# Patient Record
Sex: FEMALE | Race: WHITE | Employment: OTHER | URBAN - METROPOLITAN AREA
[De-identification: names, ages, dates, MRNs, and addresses within clinical notes are randomized per-mention and may not be internally consistent; named-entity substitution may affect disease eponyms.]

---

## 2017-01-05 ENCOUNTER — HOSPITAL ENCOUNTER (OUTPATIENT)
Dept: MRI IMAGING | Age: 77
Discharge: HOME | End: 2017-01-05
Attending: INTERNAL MEDICINE
Payer: MEDICARE

## 2017-01-05 DIAGNOSIS — R10.10 UPPER ABDOMINAL PAIN, UNSPECIFIED: ICD-10-CM

## 2017-01-05 LAB — CREAT UR-MCNC: 1 MG/DL (ref 0.6–1.3)

## 2017-01-05 PROCEDURE — 74183 MRI ABD W/O CNTR FLWD CNTR: CPT

## 2017-01-05 PROCEDURE — 82565 ASSAY OF CREATININE: CPT

## 2017-01-05 PROCEDURE — A9585 GADOBUTROL INJECTION: HCPCS | Performed by: INTERNAL MEDICINE

## 2017-01-05 PROCEDURE — 74011250636 HC RX REV CODE- 250/636: Performed by: INTERNAL MEDICINE

## 2017-01-05 RX ADMIN — GADOBUTROL 15 ML: 604.72 INJECTION INTRAVENOUS at 16:57

## 2017-01-27 ENCOUNTER — HOSPITAL ENCOUNTER (OUTPATIENT)
Dept: PREADMISSION TESTING | Age: 77
Discharge: HOME | End: 2017-01-27
Payer: MEDICARE

## 2017-01-27 DIAGNOSIS — K80.50 CALCULUS OF BILE DUCT WITHOUT CHOLANGITIS OR CHOLECYSTITIS WITHOUT OBSTRUCTION: ICD-10-CM

## 2017-01-27 LAB
ALBUMIN SERPL BCP-MCNC: 3.9 G/DL (ref 3.4–5)
ALBUMIN/GLOB SERPL: 1.2 {RATIO} (ref 0.8–1.7)
ALP SERPL-CCNC: 83 U/L (ref 45–117)
ALT SERPL-CCNC: 19 U/L (ref 13–56)
ANION GAP BLD CALC-SCNC: 6 MMOL/L (ref 3–18)
AST SERPL W P-5'-P-CCNC: 11 U/L (ref 15–37)
BASOPHILS # BLD AUTO: 0 K/UL (ref 0–0.06)
BASOPHILS # BLD: 1 % (ref 0–2)
BILIRUB SERPL-MCNC: 0.4 MG/DL (ref 0.2–1)
BUN SERPL-MCNC: 20 MG/DL (ref 7–18)
BUN/CREAT SERPL: 22 (ref 12–20)
CALCIUM SERPL-MCNC: 8.9 MG/DL (ref 8.5–10.1)
CHLORIDE SERPL-SCNC: 107 MMOL/L (ref 100–108)
CO2 SERPL-SCNC: 31 MMOL/L (ref 21–32)
CREAT SERPL-MCNC: 0.91 MG/DL (ref 0.6–1.3)
DIFFERENTIAL METHOD BLD: ABNORMAL
EOSINOPHIL # BLD: 0.5 K/UL (ref 0–0.4)
EOSINOPHIL NFR BLD: 7 % (ref 0–5)
ERYTHROCYTE [DISTWIDTH] IN BLOOD BY AUTOMATED COUNT: 12.8 % (ref 11.6–14.5)
GLOBULIN SER CALC-MCNC: 3.3 G/DL (ref 2–4)
GLUCOSE SERPL-MCNC: 93 MG/DL (ref 74–99)
HCT VFR BLD AUTO: 42.9 % (ref 35–45)
HGB BLD-MCNC: 13.9 G/DL (ref 12–16)
INR PPP: 1 (ref 0.8–1.2)
LIPASE SERPL-CCNC: 287 U/L (ref 73–393)
LYMPHOCYTES # BLD AUTO: 28 % (ref 21–52)
LYMPHOCYTES # BLD: 1.8 K/UL (ref 0.9–3.6)
MCH RBC QN AUTO: 32.2 PG (ref 24–34)
MCHC RBC AUTO-ENTMCNC: 32.4 G/DL (ref 31–37)
MCV RBC AUTO: 99.3 FL (ref 74–97)
MONOCYTES # BLD: 0.7 K/UL (ref 0.05–1.2)
MONOCYTES NFR BLD AUTO: 10 % (ref 3–10)
NEUTS SEG # BLD: 3.5 K/UL (ref 1.8–8)
NEUTS SEG NFR BLD AUTO: 54 % (ref 40–73)
PLATELET # BLD AUTO: 287 K/UL (ref 135–420)
PMV BLD AUTO: 10.1 FL (ref 9.2–11.8)
POTASSIUM SERPL-SCNC: 3.8 MMOL/L (ref 3.5–5.5)
PROT SERPL-MCNC: 7.2 G/DL (ref 6.4–8.2)
PROTHROMBIN TIME: 12.4 SEC (ref 11.5–15.2)
RBC # BLD AUTO: 4.32 M/UL (ref 4.2–5.3)
SODIUM SERPL-SCNC: 144 MMOL/L (ref 136–145)
WBC # BLD AUTO: 6.5 K/UL (ref 4.6–13.2)

## 2017-01-27 PROCEDURE — 80053 COMPREHEN METABOLIC PANEL: CPT | Performed by: INTERNAL MEDICINE

## 2017-01-27 PROCEDURE — 36415 COLL VENOUS BLD VENIPUNCTURE: CPT | Performed by: INTERNAL MEDICINE

## 2017-01-27 PROCEDURE — 83690 ASSAY OF LIPASE: CPT | Performed by: INTERNAL MEDICINE

## 2017-01-27 PROCEDURE — 85025 COMPLETE CBC W/AUTO DIFF WBC: CPT | Performed by: INTERNAL MEDICINE

## 2017-01-27 PROCEDURE — 85610 PROTHROMBIN TIME: CPT | Performed by: INTERNAL MEDICINE

## 2017-02-06 ENCOUNTER — ANESTHESIA EVENT (OUTPATIENT)
Dept: ENDOSCOPY | Age: 77
DRG: 439 | End: 2017-02-06
Payer: MEDICARE

## 2017-02-07 ENCOUNTER — HOSPITAL ENCOUNTER (OUTPATIENT)
Age: 77
Setting detail: OUTPATIENT SURGERY
Discharge: HOME | DRG: 439 | End: 2017-02-07
Attending: INTERNAL MEDICINE | Admitting: INTERNAL MEDICINE
Payer: MEDICARE

## 2017-02-07 ENCOUNTER — ANESTHESIA (OUTPATIENT)
Dept: ENDOSCOPY | Age: 77
DRG: 439 | End: 2017-02-07
Payer: MEDICARE

## 2017-02-07 ENCOUNTER — APPOINTMENT (OUTPATIENT)
Dept: GENERAL RADIOLOGY | Age: 77
DRG: 439 | End: 2017-02-07
Attending: INTERNAL MEDICINE
Payer: MEDICARE

## 2017-02-07 ENCOUNTER — SURGERY (OUTPATIENT)
Age: 77
End: 2017-02-07

## 2017-02-07 VITALS
DIASTOLIC BLOOD PRESSURE: 87 MMHG | TEMPERATURE: 97 F | WEIGHT: 134 LBS | HEART RATE: 60 BPM | BODY MASS INDEX: 24.66 KG/M2 | SYSTOLIC BLOOD PRESSURE: 109 MMHG | HEIGHT: 62 IN | OXYGEN SATURATION: 100 % | RESPIRATION RATE: 14 BRPM

## 2017-02-07 LAB
ATRIAL RATE: 59 BPM
CALCULATED P AXIS, ECG09: 69 DEGREES
CALCULATED R AXIS, ECG10: 59 DEGREES
CALCULATED T AXIS, ECG11: 43 DEGREES
DIAGNOSIS, 93000: NORMAL
P-R INTERVAL, ECG05: 186 MS
Q-T INTERVAL, ECG07: 462 MS
QRS DURATION, ECG06: 70 MS
QTC CALCULATION (BEZET), ECG08: 457 MS
VENTRICULAR RATE, ECG03: 59 BPM

## 2017-02-07 PROCEDURE — BF101ZZ FLUOROSCOPY OF BILE DUCTS USING LOW OSMOLAR CONTRAST: ICD-10-PCS | Performed by: INTERNAL MEDICINE

## 2017-02-07 PROCEDURE — 0F7C8ZZ DILATION OF AMPULLA OF VATER, VIA NATURAL OR ARTIFICIAL OPENING ENDOSCOPIC: ICD-10-PCS | Performed by: INTERNAL MEDICINE

## 2017-02-07 PROCEDURE — 0FCC8ZZ EXTIRPATION OF MATTER FROM AMPULLA OF VATER, VIA NATURAL OR ARTIFICIAL OPENING ENDOSCOPIC: ICD-10-PCS | Performed by: INTERNAL MEDICINE

## 2017-02-07 RX ORDER — ONDANSETRON 2 MG/ML
INJECTION INTRAMUSCULAR; INTRAVENOUS AS NEEDED
Status: DISCONTINUED | OUTPATIENT
Start: 2017-02-07 | End: 2017-02-07 | Stop reason: HOSPADM

## 2017-02-07 RX ORDER — SODIUM CHLORIDE 0.9 % (FLUSH) 0.9 %
5-10 SYRINGE (ML) INJECTION AS NEEDED
Status: CANCELLED | OUTPATIENT
Start: 2017-02-07 | End: 2017-02-07

## 2017-02-07 RX ORDER — MIDAZOLAM HYDROCHLORIDE 1 MG/ML
INJECTION, SOLUTION INTRAMUSCULAR; INTRAVENOUS AS NEEDED
Status: DISCONTINUED | OUTPATIENT
Start: 2017-02-07 | End: 2017-02-07 | Stop reason: HOSPADM

## 2017-02-07 RX ORDER — HYDRALAZINE HYDROCHLORIDE 20 MG/ML
INJECTION INTRAMUSCULAR; INTRAVENOUS AS NEEDED
Status: DISCONTINUED | OUTPATIENT
Start: 2017-02-07 | End: 2017-02-07 | Stop reason: HOSPADM

## 2017-02-07 RX ORDER — ONDANSETRON 2 MG/ML
INJECTION INTRAMUSCULAR; INTRAVENOUS
Status: DISCONTINUED
Start: 2017-02-07 | End: 2017-02-07 | Stop reason: HOSPADM

## 2017-02-07 RX ORDER — CIPROFLOXACIN 2 MG/ML
400 INJECTION, SOLUTION INTRAVENOUS EVERY 24 HOURS
Status: DISCONTINUED | OUTPATIENT
Start: 2017-02-07 | End: 2017-02-07 | Stop reason: HOSPADM

## 2017-02-07 RX ORDER — ONDANSETRON 2 MG/ML
4 INJECTION INTRAMUSCULAR; INTRAVENOUS
Status: CANCELLED | OUTPATIENT
Start: 2017-02-07

## 2017-02-07 RX ORDER — SODIUM CHLORIDE, SODIUM LACTATE, POTASSIUM CHLORIDE, CALCIUM CHLORIDE 600; 310; 30; 20 MG/100ML; MG/100ML; MG/100ML; MG/100ML
50 INJECTION, SOLUTION INTRAVENOUS CONTINUOUS
Status: DISCONTINUED | OUTPATIENT
Start: 2017-02-08 | End: 2017-02-07 | Stop reason: HOSPADM

## 2017-02-07 RX ORDER — SODIUM CHLORIDE 0.9 % (FLUSH) 0.9 %
5-10 SYRINGE (ML) INJECTION EVERY 8 HOURS
Status: CANCELLED | OUTPATIENT
Start: 2017-02-07 | End: 2017-02-07

## 2017-02-07 RX ORDER — PROPOFOL 10 MG/ML
INJECTION, EMULSION INTRAVENOUS AS NEEDED
Status: DISCONTINUED | OUTPATIENT
Start: 2017-02-07 | End: 2017-02-07 | Stop reason: HOSPADM

## 2017-02-07 RX ORDER — DEXTROMETHORPHAN/PSEUDOEPHED 2.5-7.5/.8
1.2 DROPS ORAL
Status: CANCELLED | OUTPATIENT
Start: 2017-02-07

## 2017-02-07 RX ORDER — LIDOCAINE HYDROCHLORIDE 20 MG/ML
INJECTION, SOLUTION EPIDURAL; INFILTRATION; INTRACAUDAL; PERINEURAL AS NEEDED
Status: DISCONTINUED | OUTPATIENT
Start: 2017-02-07 | End: 2017-02-07 | Stop reason: HOSPADM

## 2017-02-07 RX ORDER — PROPOFOL 10 MG/ML
INJECTION, EMULSION INTRAVENOUS
Status: DISCONTINUED | OUTPATIENT
Start: 2017-02-07 | End: 2017-02-07 | Stop reason: HOSPADM

## 2017-02-07 RX ADMIN — IOVERSOL 38 ML: 678 INJECTION INTRA-ARTERIAL; INTRAVENOUS at 14:41

## 2017-02-07 RX ADMIN — LIDOCAINE HYDROCHLORIDE 80 MG: 20 INJECTION, SOLUTION EPIDURAL; INFILTRATION; INTRACAUDAL; PERINEURAL at 13:56

## 2017-02-07 RX ADMIN — PROPOFOL 70 MG: 10 INJECTION, EMULSION INTRAVENOUS at 13:56

## 2017-02-07 RX ADMIN — MIDAZOLAM HYDROCHLORIDE 1 MG: 1 INJECTION, SOLUTION INTRAMUSCULAR; INTRAVENOUS at 13:56

## 2017-02-07 RX ADMIN — ONDANSETRON 4 MG: 2 INJECTION INTRAMUSCULAR; INTRAVENOUS at 15:00

## 2017-02-07 RX ADMIN — HYDRALAZINE HYDROCHLORIDE 10 MG: 20 INJECTION INTRAMUSCULAR; INTRAVENOUS at 14:08

## 2017-02-07 RX ADMIN — SODIUM CHLORIDE, SODIUM LACTATE, POTASSIUM CHLORIDE, AND CALCIUM CHLORIDE 50 ML/HR: 600; 310; 30; 20 INJECTION, SOLUTION INTRAVENOUS at 12:43

## 2017-02-07 RX ADMIN — PROPOFOL 20 MG: 10 INJECTION, EMULSION INTRAVENOUS at 14:27

## 2017-02-07 RX ADMIN — PROPOFOL 20 MG: 10 INJECTION, EMULSION INTRAVENOUS at 14:30

## 2017-02-07 RX ADMIN — GLUCAGON HYDROCHLORIDE 0.5 MG: KIT at 14:08

## 2017-02-07 RX ADMIN — PROPOFOL 100 MCG/KG/MIN: 10 INJECTION, EMULSION INTRAVENOUS at 13:58

## 2017-02-07 RX ADMIN — CIPROFLOXACIN 400 MG: 2 INJECTION, SOLUTION INTRAVENOUS at 13:55

## 2017-02-07 NOTE — PROGRESS NOTES
Patient ordered Cipro 400mg iv q 12 hours  CrCl~45ml/min  Order changed to cipro 400mg IV q 24 hrs per renal dosing protocol.   Pharmacy to monitor and adjust.

## 2017-02-07 NOTE — IP AVS SNAPSHOT
[address] 
[phone] Patient: Erika Avina MRN: [id number] BAU:[birth year]42[birth year] You are allergic to the following Allergen Reactions Codeine Other (comments) Chills, Loss of consciousness Norco (Hydrocodone-Acetaminophen) Nausea Only Vistaril (Hydroxyzine Pamoate) Vertigo Recent Documentation Height Weight Breastfeeding? BMI OB Status Smoking Status 1.575 m 60.8 kg No 24.51 kg/m2 Postmenopausal Former Smoker Emergency Contacts Name Discharge Info Relation Home Work Mobile [address] CAREGIVER [3] Daughter [21]   [phone] About your hospitalization You were admitted on:  February 7, 2017 [facility] You were discharged on:  February 7, 2017 Unit phone number:  [phone] Why you were hospitalized Your primary diagnosis was:  Not on File Providers Seen During Your Hospitalizations Provider Role Specialty Primary office phone Annamarie Aguillon MD Attending Provider Gastroenterology [phone] Your Primary Care Physician (PCP) Primary Care Physician Office Phone Office Fax Alan Chong [phone] Follow-up Information Follow up With Details Comments Contact Info Flaca Ba MD   [facility]  
[facility] [address] [facility] [address] [phone] Current Discharge Medication List  
  
CONTINUE these medications which have NOT CHANGED Dose & Instructions Dispensing Information Comments Morning Noon Evening Bedtime  
 amLODIPine 10 mg tablet Commonly known as:  Phuong Colon Your next dose is: Today, Tomorrow Other:  _________ Take  by mouth daily. Refills:  0  
     
   
   
   
  
 esomeprazole 40 mg capsule Commonly known as:  Maxwell Roe Your next dose is: Today, Tomorrow Other:  _________ Take  by mouth daily. Refills:  0  
     
   
   
   
  
 losartan 100 mg tablet Commonly known as:  COZAAR Your next dose is: Today, Tomorrow Other:  _________ Dose:  100 mg Take 100 mg by mouth daily. Refills:  0  
     
   
   
   
  
 metroNIDAZOLE 1 % topical gel Commonly known as:  Carroll Arnel Your next dose is: Today, Tomorrow Other:  _________ Apply  to affected area daily. Use a thin layer to affected areas after washing Refills:  0  
     
   
   
   
  
 multivitamin with iron tablet Your next dose is: Today, Tomorrow Other:  _________ Dose:  1 Tab Take 1 Tab by mouth daily. Refills:  0  
     
   
   
   
  
 nystatin-triamcinolone topical cream  
Commonly known as:  MYCOLOG II Your next dose is: Today, Tomorrow Other:  _________ Apply  to affected area two (2) times a day. Refills:  0  
     
   
   
   
  
 solifenacin 10 mg tablet Commonly known as:  Gearldine Pedro Luis Your next dose is: Today, Tomorrow Other:  _________ Dose:  10 mg Take 1 Tab by mouth daily. Quantity:  90 Tab Refills:  3  
     
   
   
   
  
 zolpidem 5 mg tablet Commonly known as:  AMBIEN Your next dose is: Today, Tomorrow Other:  _________ Take  by mouth nightly as needed for Sleep. Refills:  0 Discharge Instructions Patient Discharge Instructions Torrie Maldonado / [id number] : 1940 Admitted 2017 Discharged: 2017 Procedure Impression: 1. Choledocholithiasis:  Multiple large stones noted in the distal common bile duct. 2. Sphincterotomy performed without complications. Balloon dilation performed with 10mm TTS balloon. 3. Stone extraction with 12mm Balloon. 4. Completion balloon cholangiogram with no further filling defects. 5. Otherwise normal ERCP Recommendation: 1. Resume regular diet. 2. Follow up with Dr. Denys Killian in 2 weeks. Recommended Diet: Regular Diet Recommended Activity: 1. Do not drink alcohol, drive or operate machinery for 12 hours 2. Call if any fever, abdominal pain or bleeding noted. Signed By: Tabatha Guerrero MD   
 February 7, 2017 Patient armband removed and given to patient to take home. Patient was informed of the privacy risks if armband lost or stolen Discharge Orders None Introducing [facility] SERVICES! [facility] introduce portal paciente MyChart . Ahora se puede acceder a partes de rios expediente médico, enviar por correo electrónico la oficina de rios médico y solicitar renovaciones de medicamentos en línea. En rios navegador de Internet , Avery Ramos a [email or web address] Nehemias clic en el usuario por Dionicio Taylor? Tristin Seed clic aquí en la sesión Edi Susan. Verá la página de registro [place]. Ingrese rios código de Bank [facility] katharina y srinivasan aparece a continuación. Usted no tendrá que [employer] código después de kristyn completado el proceso de registro . Si usted no se inscribe antes de la fecha de caducidad , debe solicitar un nuevo código. · MyChart Código de acceso : [id number] Expires: 2/28/2017 11:27 AM 
 
Ingresa los últimos cuatro dígitos de rios Número de Seguro Social ( xxxx ) y fecha de nacimiento ( dd / mm / aaaa ) srinivasan se indica y nehemias clic en Enviar. Usted será llevado a la siguiente página de registro . Crear un ID MyChart . Esta será rios ID de inicio de sesión de MyChart y no puede ser [place] , por lo que pensar en anita que es Julane Osier y fácil de recordar . Crear anita contraseña MyChart . Usted puede cambiar rios contraseña en cualquier momento . Ingrese rios Password Reset de preguntas y [place] .  Melvina se puede utilizar en un momento posterior si usted olvida rios contraseña. Introduzca rios dirección de correo electrónico . Venkata Clinechment recibirá anita notificación por correo electrónico cuando la nueva información está disponible en MyChart . Becki lantigua en Registrarse. Floydene Esther michelle y descargar porciones de rios expediente médico. 
Lisbeth clic en el enlace de descarga del menú Resumen para descargar anita copia portátil de rios información médica . Si tiene [employer] , por favor visite la sección de preguntas frecuentes del sitio web MyChart . Recuerde, MyChart NO es que se utilizará para las necesidades urgentes. Para emergencias médicas , llame al 911 . Ahora disponible en rios iPhone y Android ! General Information Please provide this summary of care documentation to your next provider. Patient Signature:  ____________________________________________________________ Date:  ____________________________________________________________  
  
[facility] Provider Signature:  ____________________________________________________________ Date:  ____________________________________________________________  
  
  
   
  
[facility] 
 
 
 [address] [place] [address] Gabrielle Crawley 
[phone] Patient: Catracho Rape MRN: [id number] :[id number][id number] Tiene alergias a lo siguiente [address] Codeine Other (comments) Chills, Loss of consciousness Norco (Hydrocodone-Acetaminophen) Nausea Only Vistaril (Hydroxyzine Pamoate) Vertigo Documentación recientes Height Weight Está amamantando? BMI [facility]) Estado obstétrico Estatus de tabaquísmo 1.575 m 60.8 kg No 24.51 kg/m2 Postmenopausal Former Smoker Emergency Contacts Name Discharge Info Relation Home Work Mobile [address] CAREGIVER [3] Daughter [21]   [phone] Sobre rios hospitalización Le admitieron el:  February 7, 2017 [facility] tratamiento más reciente fue el:  [facility] PHASE 2 RECOVERY Le dieron de aleksandar el:  February 7, 2017 Número de teléfono de la unidad:  [phone] Por qué le ingresaron [facility] diagnosis primaria es:  No está archivado/a Proveedores de verse danay georgiana hospitalizaciones Personal Médico Rol Especialidad Teléfono principal de la oficina Zaire Taylor MD Attending Provider Gastroenterology [phone] [facility] médico de atención primaria (PCP ) Primary Care Physician Office Phone Office Fax [facility] [phone] Follow-up Information Follow up With Details Comments Contact Info MD [address]rn S[address] [facility] [address] [facility] [address] [phone] Aprobación de la gestión actual lista de medicamentos CONTINUAR estos medicamentos que no [place] Dosis e instrucciones Información de dispensación Comentarios Morning Noon Evening Bedtime  
 amLODIPine 10 mg tablet También conocido srinivasan:  Tad Jacque Your next dose is: Today, Tomorrow Other:  _________ Take  by mouth daily. recargas:  0  
     
   
   
   
  
 esomeprazole 40 mg capsule También conocido srinivasan:  Tianna Ruiz Your next dose is: Today, Tomorrow Other:  _________ Take  by mouth daily. recargas:  0  
     
   
   
   
  
 losartan 100 mg tablet También conocido srinivasan:  COZAAR Your next dose is: Today, Tomorrow Other:  _________ Dosis:  100 mg Take 100 mg by mouth daily. recargas:  0  
     
   
   
   
  
 metroNIDAZOLE 1 % topical gel También conocido srinivasan:  Caroline Han Your next dose is: Today, Tomorrow Other:  _________ Apply  to affected area daily. Use a thin layer to affected areas after washing  
 recargas:  0 multivitamin with iron tablet Your next dose is: Today, Tomorrow Other:  _________ Dosis:  1 Tab Take 1 Tab by mouth daily. recargas:  0  
     
   
   
   
  
 nystatin-triamcinolone topical cream  
También conocido srinivasan:  MYCOLOG II Your next dose is: Today, Tomorrow Other:  _________ Apply  to affected area two (2) times a day. recargas:  0  
     
   
   
   
  
 solifenacin 10 mg tablet También conocido srinivasan:  Queen Beverage Your next dose is: Today, Tomorrow Other:  _________ Dosis:  10 mg Take 1 Tab by mouth daily. cantidad:  90 Tab  
recargas:  3  
     
   
   
   
  
 zolpidem 5 mg tablet También conocido srinivasan:  Julius Em Your next dose is: Today, Tomorrow Other:  _________ Take  by mouth nightly as needed for Sleep.  
 recargas:  0 Discharge Instructions Patient Discharge Instructions Sangeeta Myers / [id number] : 1940 Admitted 2017 Discharged: 2017 Procedure Impression: 1. Choledocholithiasis:  Multiple large stones noted in the distal common bile duct. 2. Sphincterotomy performed without complications. Balloon dilation performed with 10mm TTS balloon. 3. Stone extraction with 12mm Balloon. 4. Completion balloon cholangiogram with no further filling defects. 5. Otherwise normal ERCP Recommendation: 1. Resume regular diet. 2. Follow up with Dr. Hodan Lepe in 2 weeks. Recommended Diet: Regular Diet Recommended Activity: 1. Do not drink alcohol, drive or operate machinery for 12 hours 2. Call if any fever, abdominal pain or bleeding noted. Signed By: Nelly Long MD   
 2017 Patient armband removed and given to patient to take home. Patient was informed of the privacy risks if armband lost or stolen Discharge Orders [facility] & HEALTH SERVICES! [facility] introduce portal paciente MyChart . Ahora se puede acceder a partes de rios expediente médico, enviar por correo electrónico la oficina de rios médico y solicitar renovaciones de medicamentos en línea. En rios navegador de Internet , Lazarus Seneca a [email or web address] Nehemias clic en el usuario por Francis Rinaldi? Jf Tan clic aquí en la sesión Norvel Jennifer. Verá la página de registro [place]. Ingrese rios código de [employer] katharina y srinivasan aparece a continuación. Usted no tendrá que [employer] código después de kristyn completado el proceso de registro . Si usted no se inscribe antes de la fecha de caducidad , debe solicitar un nuevo código. · MyChart Código de acceso : [id number] Expires: 2/28/2017 11:27 AM 
 
Ingresa los últimos cuatro dígitos de rios Número de Seguro Social ( xxxx ) y fecha de nacimiento ( dd / mm / aaaa ) srinivasan se indica y nehemias clic en Enviar. Usted será llevado a la siguiente página de registro . Crear un ID MyChart . Esta será rios ID de inicio de sesión de MyChart y no puede ser [place] , por lo que pensar en anita que es [place]  y fácil de recordar . Crear anita contraseña MyChart . Usted puede cambiar rios contraseña en cualquier momento . Ingrese rios Password Reset de preguntas y [place] . [place] se puede utilizar en un momento posterior si usted olvida rios contraseña. Introduzca rios dirección de correo electrónico . Roseanna Lin recibirá anita notificación por correo electrónico cuando la nueva información está disponible en MyChart . Mike Noland clic en Registrarse. Daralene Kanwal michelle y descargar porciones de rios expediente médico. 
Nehemias clic en el enlace de descarga del menú Resumen para descargar anita copia portátil de rios información médica . Si tiene [employer] , por favor visite la sección de preguntas frecuentes del sitio web MyChart . Recuerde, MyChart NO es que se utilizará para las necesidades urgentes. Para emergencias médicas , llame al 911 . Ahora disponible en rios iPhone y Android ! General Information Please provide this summary of care documentation to your next provider. Patient Signature:  ____________________________________________________________ Date:  ____________________________________________________________  
  
[facility] Signature:  ____________________________________________________________ Date:  ____________________________________________________________

## 2017-02-07 NOTE — DISCHARGE INSTRUCTIONS
Patient Discharge Instructions    Carla Guillory / [id number] : 1940    Admitted 2017 Discharged: 2017         Procedure Impression:  1. Choledocholithiasis:  Multiple large stones noted in the distal common bile duct. 2. Sphincterotomy performed without complications. Balloon dilation performed with 10mm TTS balloon. 3. Stone extraction with 12mm Balloon. 4. Completion balloon cholangiogram with no further filling defects. 5. Otherwise normal ERCP      Recommendation:  1. Resume regular diet. 2. Follow up with Dr. Mirela Arguelles in 2 weeks. Recommended Diet: Regular Diet    Recommended Activity:    1. Do not drink alcohol, drive or operate machinery for 12 hours   2. Call if any fever, abdominal pain or bleeding noted. Signed By: Jose He MD     2017       Patient armband removed and given to patient to take home.   Patient was informed of the privacy risks if armband lost or stolen

## 2017-02-07 NOTE — H&P
History and Physical    Ladonna Overall        [birth year]  [id number]        [id number]     Pre-Procedure Diagnosis:  k80.50    Chief Complaint:  No chief complaint on file. HPI: patient with recent MRCP with dilated duct and multiple CBD stones. She has no jaundice. No other complaints related. She presents for ERCP. Past Medical History   Diagnosis Date    Abnormal LFTs     Acute neck sprain     Bursitis      right shoulder    Cervicalgia     Chest pain     CKD (chronic kidney disease), stage III     Dyslipidemia     Dyspnea     PATRICIA (generalized anxiety disorder)     Gallstones     Gastritis     Gastritis with bleeding     GERD (gastroesophageal reflux disease)     Hordeolum     Hyperlipidemia     Hypertension     Incontinence     Insomnia     OAB (overactive bladder)     Pruritus      Past Surgical History   Procedure Laterality Date    Hx appendectomy      Hx hysterectomy      Pr appendectomy      Hx endoscopy      Hx colonoscopy       Family History   Problem Relation Age of Onset    No Known Problems Brother      Social History     Social History    Marital status:      Spouse name: N/A    Number of children: N/A    Years of education: N/A     Social History Main Topics    Smoking status: Former Smoker     Quit date: 1/1/1980    Smokeless tobacco: Never Used    Alcohol use Yes      Comment: occassionally    Drug use: No    Sexual activity: Not Asked     Other Topics Concern    None     Social History Narrative       Allergies:     Allergies   Allergen Reactions    Codeine Other (comments)     Chills, Loss of consciousness     Norco [Hydrocodone-Acetaminophen] Nausea Only    Vistaril [Hydroxyzine Pamoate] Vertigo     Medications:   Current Facility-Administered Medications   Medication Dose Route Frequency    [START ON 2/8/2017] lactated ringers infusion  50 mL/hr IntraVENous CONTINUOUS    ioversol (OPTIRAY) 320 mg iodine/mL contrast injection 100 mL  100 mL InterCATHeter PRN    glucagon (GLUCAGEN) injection 1 mg  1 mg IntraVENous ONCE    ciprofloxacin (CIPRO) 400 mg IVPB (premix)  400 mg IntraVENous Q12H     Vital Signs   Visit Vitals    /70    Pulse (!) 58    Temp 97 °F (36.1 °C)    Resp 16    Ht 5' 2\" (1.575 m)    Wt 60.8 kg (134 lb)    SpO2 96%    Breastfeeding No    BMI 24.51 kg/m2       Review of Systems  A comprehensive review of systems was negative except for that written in the History of Present Illness. Physical Exam:  General:  Alert, cooperative, no distress, appears stated age. Eyes:  Conjunctivae/corneas clear. PERRL, EOMs intact. Fundi benign           Mouth/Throat: Lips, mucosa, and tongue normal. Teeth and gums normal.   Neck: Supple, symmetrical, trachea midline, no adenopathy, thyroid: no enlargement/tenderness/nodules, no carotid bruit and no JVD. Lungs:   Clear to auscultation bilaterally. Heart:  Regular rate and rhythm, S1, S2 normal, no murmur, click, rub or gallop. Abdomen:   Soft, non-tender. Bowel sounds normal. No masses,  No organomegaly. Extremities: Extremities normal, atraumatic, no cyanosis or edema. Skin: Skin color, texture, turgor normal. No rashes or lesions             Laboratory Data:  Recent Results (from the past 24 hour(s))   EKG, 12 LEAD, INITIAL    Collection Time: 02/07/17 12:29 PM   Result Value Ref Range    Ventricular Rate 59 BPM    Atrial Rate 59 BPM    P-R Interval 186 ms    QRS Duration 70 ms    Q-T Interval 462 ms    QTC Calculation (Bezet) 457 ms    Calculated P Axis 69 degrees    Calculated R Axis 59 degrees    Calculated T Axis 43 degrees    Diagnosis       Sinus bradycardia  Otherwise normal ECG  When compared with ECG of 18-DEC-2011 08:02,  T wave amplitude has decreased in Lateral leads         Hospital Problems  Date Reviewed: 2/7/2017    None          Impression and Plan:  Choledocholithiasis. MRCP with multiple CBD stones. Recommend ERCP.   See prior H&P. Vee Smalls MD  2/7/2017  1:40 PM

## 2017-02-07 NOTE — ANESTHESIA PREPROCEDURE EVALUATION
Anesthetic History               Review of Systems / Medical History  Patient summary reviewed and pertinent labs reviewed    Pulmonary                Comments: Current Smoker? NO       Elective Surgery? Yes       Abstained from smoking 24 hours prior to anesthesia? N/A    Risk Factors for Postoperative nausea/vomiting:       History of postoperative nausea/vomiting? NO       Female? YES       Motion sickness? NO       Intended opioid administration for postoperative analgesia?   NO   Neuro/Psych   Within defined limits           Cardiovascular    Hypertension                   GI/Hepatic/Renal     GERD    Renal disease: CRI       Endo/Other        Arthritis     Other Findings              Physical Exam    Airway  Mallampati: II  TM Distance: 4 - 6 cm  Neck ROM: normal range of motion   Mouth opening: Diminished (comment)     Cardiovascular    Rhythm: irregular  Rate: normal         Dental    Dentition: Upper partial plate and Lower partial plate     Pulmonary      Decreased breath sounds: bilateral           Abdominal  GI exam deferred       Other Findings            Anesthetic Plan    ASA: 3  Anesthesia type: MAC            Anesthetic plan and risks discussed with: Patient

## 2017-02-08 ENCOUNTER — HOSPITAL ENCOUNTER (INPATIENT)
Age: 77
LOS: 2 days | Discharge: HOME | DRG: 439 | End: 2017-02-10
Attending: EMERGENCY MEDICINE | Admitting: HOSPITALIST
Payer: MEDICARE

## 2017-02-08 ENCOUNTER — APPOINTMENT (OUTPATIENT)
Dept: ULTRASOUND IMAGING | Age: 77
DRG: 439 | End: 2017-02-08
Attending: EMERGENCY MEDICINE
Payer: MEDICARE

## 2017-02-08 ENCOUNTER — APPOINTMENT (OUTPATIENT)
Dept: CT IMAGING | Age: 77
DRG: 439 | End: 2017-02-08
Attending: EMERGENCY MEDICINE
Payer: MEDICARE

## 2017-02-08 DIAGNOSIS — E80.6 HYPERBILIRUBINEMIA: ICD-10-CM

## 2017-02-08 DIAGNOSIS — R11.2 NON-INTRACTABLE VOMITING WITH NAUSEA, UNSPECIFIED VOMITING TYPE: Primary | ICD-10-CM

## 2017-02-08 DIAGNOSIS — Z98.890 S/P ERCP: ICD-10-CM

## 2017-02-08 DIAGNOSIS — R79.89 ELEVATED LFTS: ICD-10-CM

## 2017-02-08 PROBLEM — K80.50 CHOLEDOCHOLITHIASIS: Status: ACTIVE | Noted: 2017-02-08

## 2017-02-08 PROBLEM — R11.15 PERSISTENT VOMITING: Status: ACTIVE | Noted: 2017-02-08

## 2017-02-08 PROBLEM — D72.829 LEUKOCYTOSIS: Status: ACTIVE | Noted: 2017-02-08

## 2017-02-08 LAB
ALBUMIN SERPL BCP-MCNC: 3.7 G/DL (ref 3.4–5)
ALBUMIN/GLOB SERPL: 1.2 {RATIO} (ref 0.8–1.7)
ALP SERPL-CCNC: 86 U/L (ref 45–117)
ALT SERPL-CCNC: 127 U/L (ref 13–56)
ANION GAP BLD CALC-SCNC: 13 MMOL/L (ref 3–18)
APPEARANCE UR: CLEAR
AST SERPL W P-5'-P-CCNC: 119 U/L (ref 15–37)
BACTERIA URNS QL MICRO: NEGATIVE /HPF
BASOPHILS # BLD AUTO: 0 K/UL (ref 0–0.06)
BASOPHILS # BLD: 0 % (ref 0–2)
BILIRUB SERPL-MCNC: 4.6 MG/DL (ref 0.2–1)
BILIRUB UR QL: ABNORMAL
BUN SERPL-MCNC: 22 MG/DL (ref 7–18)
BUN/CREAT SERPL: 18 (ref 12–20)
CALCIUM SERPL-MCNC: 8.9 MG/DL (ref 8.5–10.1)
CHLORIDE SERPL-SCNC: 103 MMOL/L (ref 100–108)
CO2 SERPL-SCNC: 23 MMOL/L (ref 21–32)
COLOR UR: ABNORMAL
CREAT SERPL-MCNC: 1.22 MG/DL (ref 0.6–1.3)
DIFFERENTIAL METHOD BLD: ABNORMAL
EOSINOPHIL # BLD: 0 K/UL (ref 0–0.4)
EOSINOPHIL NFR BLD: 0 % (ref 0–5)
EPITH CASTS URNS QL MICRO: NORMAL /LPF (ref 0–5)
ERYTHROCYTE [DISTWIDTH] IN BLOOD BY AUTOMATED COUNT: 12.6 % (ref 11.6–14.5)
GLOBULIN SER CALC-MCNC: 3.1 G/DL (ref 2–4)
GLUCOSE SERPL-MCNC: 126 MG/DL (ref 74–99)
GLUCOSE UR STRIP.AUTO-MCNC: NEGATIVE MG/DL
HCT VFR BLD AUTO: 41.9 % (ref 35–45)
HGB BLD-MCNC: 14.5 G/DL (ref 12–16)
HGB UR QL STRIP: NEGATIVE
KETONES UR QL STRIP.AUTO: NEGATIVE MG/DL
LEUKOCYTE ESTERASE UR QL STRIP.AUTO: ABNORMAL
LIPASE SERPL-CCNC: 459 U/L (ref 73–393)
LYMPHOCYTES # BLD AUTO: 4 % (ref 21–52)
LYMPHOCYTES # BLD: 0.6 K/UL (ref 0.9–3.6)
MCH RBC QN AUTO: 33.4 PG (ref 24–34)
MCHC RBC AUTO-ENTMCNC: 34.6 G/DL (ref 31–37)
MCV RBC AUTO: 96.5 FL (ref 74–97)
MONOCYTES # BLD: 0.1 K/UL (ref 0.05–1.2)
MONOCYTES NFR BLD AUTO: 1 % (ref 3–10)
NEUTS SEG # BLD: 13.4 K/UL (ref 1.8–8)
NEUTS SEG NFR BLD AUTO: 95 % (ref 40–73)
NITRITE UR QL STRIP.AUTO: NEGATIVE
PH UR STRIP: 7.5 [PH] (ref 5–8)
PLATELET # BLD AUTO: 261 K/UL (ref 135–420)
PMV BLD AUTO: 9.2 FL (ref 9.2–11.8)
POTASSIUM SERPL-SCNC: 3.4 MMOL/L (ref 3.5–5.5)
PROT SERPL-MCNC: 6.8 G/DL (ref 6.4–8.2)
PROT UR STRIP-MCNC: NEGATIVE MG/DL
RBC # BLD AUTO: 4.34 M/UL (ref 4.2–5.3)
RBC #/AREA URNS HPF: 0 /HPF (ref 0–5)
SODIUM SERPL-SCNC: 139 MMOL/L (ref 136–145)
SP GR UR REFRACTOMETRY: >1.03 (ref 1–1.03)
UROBILINOGEN UR QL STRIP.AUTO: >8 EU/DL (ref 0.2–1)
WBC # BLD AUTO: 14.2 K/UL (ref 4.6–13.2)
WBC URNS QL MICRO: NORMAL /HPF (ref 0–4)

## 2017-02-08 PROCEDURE — 83690 ASSAY OF LIPASE: CPT | Performed by: EMERGENCY MEDICINE

## 2017-02-08 PROCEDURE — 74011250637 HC RX REV CODE- 250/637: Performed by: PHYSICIAN ASSISTANT

## 2017-02-08 PROCEDURE — 74011250636 HC RX REV CODE- 250/636: Performed by: PHYSICIAN ASSISTANT

## 2017-02-08 PROCEDURE — 96361 HYDRATE IV INFUSION ADD-ON: CPT

## 2017-02-08 PROCEDURE — 65270000029 HC RM PRIVATE

## 2017-02-08 PROCEDURE — 96374 THER/PROPH/DIAG INJ IV PUSH: CPT

## 2017-02-08 PROCEDURE — 96375 TX/PRO/DX INJ NEW DRUG ADDON: CPT

## 2017-02-08 PROCEDURE — 76705 ECHO EXAM OF ABDOMEN: CPT

## 2017-02-08 PROCEDURE — 85025 COMPLETE CBC W/AUTO DIFF WBC: CPT | Performed by: EMERGENCY MEDICINE

## 2017-02-08 PROCEDURE — 80053 COMPREHEN METABOLIC PANEL: CPT | Performed by: EMERGENCY MEDICINE

## 2017-02-08 PROCEDURE — 99285 EMERGENCY DEPT VISIT HI MDM: CPT

## 2017-02-08 PROCEDURE — 74011250636 HC RX REV CODE- 250/636: Performed by: EMERGENCY MEDICINE

## 2017-02-08 PROCEDURE — 81001 URINALYSIS AUTO W/SCOPE: CPT | Performed by: EMERGENCY MEDICINE

## 2017-02-08 PROCEDURE — 74177 CT ABD & PELVIS W/CONTRAST: CPT

## 2017-02-08 PROCEDURE — 74011636320 HC RX REV CODE- 636/320: Performed by: EMERGENCY MEDICINE

## 2017-02-08 RX ORDER — AMLODIPINE BESYLATE 10 MG/1
10 TABLET ORAL DAILY
Status: DISCONTINUED | OUTPATIENT
Start: 2017-02-09 | End: 2017-02-10 | Stop reason: HOSPADM

## 2017-02-08 RX ORDER — BENZONATATE 200 MG/1
200 CAPSULE ORAL
COMMUNITY
End: 2017-09-28

## 2017-02-08 RX ORDER — LOSARTAN POTASSIUM 50 MG/1
100 TABLET ORAL DAILY
Status: DISCONTINUED | OUTPATIENT
Start: 2017-02-09 | End: 2017-02-10 | Stop reason: HOSPADM

## 2017-02-08 RX ORDER — DEXTROSE, SODIUM CHLORIDE, AND POTASSIUM CHLORIDE 5; .45; .15 G/100ML; G/100ML; G/100ML
125 INJECTION INTRAVENOUS CONTINUOUS
Status: DISCONTINUED | OUTPATIENT
Start: 2017-02-08 | End: 2017-02-10 | Stop reason: HOSPADM

## 2017-02-08 RX ORDER — AMOXICILLIN 875 MG/1
875 TABLET, FILM COATED ORAL 2 TIMES DAILY
COMMUNITY
End: 2017-02-10

## 2017-02-08 RX ORDER — PANTOPRAZOLE SODIUM 40 MG/1
40 TABLET, DELAYED RELEASE ORAL
Status: DISCONTINUED | OUTPATIENT
Start: 2017-02-09 | End: 2017-02-10 | Stop reason: HOSPADM

## 2017-02-08 RX ORDER — ONDANSETRON 2 MG/ML
4 INJECTION INTRAMUSCULAR; INTRAVENOUS
Status: DISCONTINUED | OUTPATIENT
Start: 2017-02-08 | End: 2017-02-10 | Stop reason: HOSPADM

## 2017-02-08 RX ORDER — ZOLPIDEM TARTRATE 5 MG/1
5 TABLET ORAL
Status: DISCONTINUED | OUTPATIENT
Start: 2017-02-08 | End: 2017-02-10 | Stop reason: HOSPADM

## 2017-02-08 RX ORDER — ONDANSETRON 2 MG/ML
4 INJECTION INTRAMUSCULAR; INTRAVENOUS
Status: COMPLETED | OUTPATIENT
Start: 2017-02-08 | End: 2017-02-08

## 2017-02-08 RX ORDER — MORPHINE SULFATE 2 MG/ML
2 INJECTION, SOLUTION INTRAMUSCULAR; INTRAVENOUS
Status: DISCONTINUED | OUTPATIENT
Start: 2017-02-08 | End: 2017-02-10 | Stop reason: HOSPADM

## 2017-02-08 RX ORDER — MORPHINE SULFATE 4 MG/ML
4 INJECTION, SOLUTION INTRAMUSCULAR; INTRAVENOUS
Status: COMPLETED | OUTPATIENT
Start: 2017-02-08 | End: 2017-02-08

## 2017-02-08 RX ORDER — ACETAMINOPHEN 325 MG/1
650 TABLET ORAL
Status: DISCONTINUED | OUTPATIENT
Start: 2017-02-08 | End: 2017-02-10 | Stop reason: HOSPADM

## 2017-02-08 RX ADMIN — SODIUM CHLORIDE 1000 ML: 900 INJECTION, SOLUTION INTRAVENOUS at 16:01

## 2017-02-08 RX ADMIN — Medication 4 MG: at 11:59

## 2017-02-08 RX ADMIN — MORPHINE SULFATE 2 MG: 2 INJECTION, SOLUTION INTRAMUSCULAR; INTRAVENOUS at 20:59

## 2017-02-08 RX ADMIN — ONDANSETRON 4 MG: 2 INJECTION INTRAMUSCULAR; INTRAVENOUS at 19:27

## 2017-02-08 RX ADMIN — ONDANSETRON 4 MG: 2 INJECTION INTRAMUSCULAR; INTRAVENOUS at 09:36

## 2017-02-08 RX ADMIN — DEXTROSE MONOHYDRATE, SODIUM CHLORIDE, AND POTASSIUM CHLORIDE 125 ML/HR: 50; 4.5; 1.49 INJECTION, SOLUTION INTRAVENOUS at 17:08

## 2017-02-08 RX ADMIN — SODIUM CHLORIDE 1000 ML: 9 INJECTION, SOLUTION INTRAVENOUS at 09:34

## 2017-02-08 RX ADMIN — ZOLPIDEM TARTRATE 5 MG: 5 TABLET, FILM COATED ORAL at 21:00

## 2017-02-08 RX ADMIN — IOPAMIDOL 80 ML: 612 INJECTION, SOLUTION INTRAVENOUS at 10:47

## 2017-02-08 NOTE — H&P
Of[facility]amar[facility]  Hospitalist Division      History & Physical    Patient: Ceci Mcclain MRN: [id number]  [facility]: [id number]    YOB: 1940  Age: 68 y.o. Sex: female    DOA: 2/8/2017 LOS:  LOS: 0 days        DOA:  2/8/2017  PCP:  Grant Hooker MD    Chief Complaint:  Vomiting and abdominal pain    Assessment/ Plan:     Patient Active Problem List   Diagnosis Code    History of gastrointestinal hemorrhage Z87.19    Incontinence R32    Hypertension I10    Hyperlipidemia E78.5    GERD (gastroesophageal reflux disease) K21.9    PATRICIA (generalized anxiety disorder) F41.1    CKD (chronic kidney disease), stage III N18.3    Choledocholithiasis K80.50    Persistent vomiting R11.10    Elevated LFTs R79.89    Leukocytosis D72.829       A/P:  - Choledocholithiasis - s/p ERCP and sphincterotomy and stone extraction yesterday with Dr. Aaliyah Moon. - Persistent vomiting with abdominal pain post-procedure - PRN pain control and anti-emetic. Continue IV fluids for hydration.   - Elevated LFT's - Follow labs. Elevation likely secondary to procedure yesterday. Trend lipase as well. - Leukocytosis - Also likely related to procedure yesterday. CT Abdomen without acute process  - HTN - Continue Norvasc/ Cozaar  - GERD - Continue PPI  - Insomnia - Continue Ambien nightly  - SCD's for DVT Prophylaxis      HPI:     Ceci Mcclain is a 68 y.o. female with a hx of HTN, GERD, CKD, Hyperlipidemia and insomnia who was admitted to [facility] on 2/8/17 for abdominal pain and vomiting post-procedure. She underwent an ERCP yesterday with Dr. Aaliyah Moon and was discharged to home after her procedure. She notes last night she developed abdominal pain and vomiting that continued in to this morning. She notes the pain was initially in her RUQ last night but this morning she \"felt it all over her whole body. \" she denies any fever, chills, diarrhea, dysuria, chest pain or shortness of breath. Today she has elevated LFT's with a total bilirubin of 4.6, ALT of 127, and . Her lipase is also elevated at 459 along with a leukocytosis of 14K. A Ct of her abdomen was consistent with changes related to her procedure yesterday without any acute process. GI has been consulted and she will be admitted for further evaluation and treatment. Past Medical History   Diagnosis Date    Abnormal LFTs     Acute neck sprain     Bursitis      right shoulder    Cervicalgia     Chest pain     CKD (chronic kidney disease), stage III     Dyslipidemia     Dyspnea     PATRICIA (generalized anxiety disorder)     Gallstones     Gastritis     Gastritis with bleeding     GERD (gastroesophageal reflux disease)     Hordeolum     Hyperlipidemia     Hypertension     Incontinence     Insomnia     OAB (overactive bladder)     Pruritus        Past Surgical History   Procedure Laterality Date    Hx appendectomy      Hx hysterectomy      Pr appendectomy      Hx endoscopy      Hx colonoscopy         Family History   Problem Relation Age of Onset    No Known Problems Brother        Social History     Social History    Marital status:      Spouse name: N/A    Number of children: N/A    Years of education: N/A     Social History Main Topics    Smoking status: Former Smoker     Quit date: 1/1/1980    Smokeless tobacco: Never Used    Alcohol use Yes      Comment: occassionally    Drug use: No    Sexual activity: Not Asked     Other Topics Concern    None     Social History Narrative       Prior to Admission medications    Medication Sig Start Date End Date Taking? Authorizing Provider   solifenacin (VESICARE) 10 mg tablet Take 1 Tab by mouth daily. 8/11/16   Rosaura English MD   metroNIDAZOLE (METROGEL) 1 % topical gel Apply  to affected area daily. Use a thin layer to affected areas after washing    Historical Provider   multivitamin with iron tablet Take 1 Tab by mouth daily.     Historical Provider zolpidem (AMBIEN) 5 mg tablet Take  by mouth nightly as needed for Sleep. Historical Provider   esomeprazole (NEXIUM) 40 mg capsule Take  by mouth daily. Historical Provider   losartan (COZAAR) 100 mg tablet Take 100 mg by mouth daily. Historical Provider   amLODIPine (NORVASC) 10 mg tablet Take  by mouth daily. Historical Provider   nystatin-triamcinolone (MYCOLOG II) topical cream Apply  to affected area two (2) times a day. Historical Provider       Allergies   Allergen Reactions    Codeine Other (comments)     Chills, Loss of consciousness     Norco [Hydrocodone-Acetaminophen] Nausea Only    Vistaril [Hydroxyzine Pamoate] Vertigo       Review of Systems:  - fever, - chills, - fatigue, - weight loss, - night sweats   - sore throat, - sinus congestion, - lymphadenopathy, - vision changes  - CP, -  palpitations  - dyspnea on exertion, - dyspnea at rest, - cough, - hemoptysis  - nausea, - vomiting, - diarrhea, + abdominal pain, - reflux, - dysphagia  - dysuria, - hematuria, - urinary frequency  - rash, - pruritis  - back pain, - neck pain, - myalgia, - arthralgia  - H/A, - numbness, - tingling, - weakness, - slurred speech    Physical Exam:      Visit Vitals    /59    Pulse 80    Temp 97.7 °F (36.5 °C)    Resp 20    SpO2 97%       Physical Exam:  Gen: In general, this is a well nourished female, in no acute distress on RA. HEENT: Sclerae nonicteric. Oral mucous membranes moist.    Neck: Supple with midline trachea. CV: RRR without murmur or rub appreciated. Resp:Respirations are unlabored without use of accessory muscles. Lung fields bilaterally without wheezes or rhonchi. Abd: Soft, nontender, nondistended. Extrem: Extremities are warm, without cyanosis or clubbing. No pitting pretibial edema. Palpable distal pulses X 4.   Skin: Warm, no visible rashes. Neuro: Patient is alert, oriented, and cooperative. No obvious focal defects. Moves all 4 extremities.     Labs Reviewed:    Recent Results (from the past 24 hour(s))   CBC WITH AUTOMATED DIFF    Collection Time: 02/08/17  9:22 AM   Result Value Ref Range    WBC 14.2 (H) 4.6 - 13.2 K/uL    RBC 4.34 4.20 - 5.30 M/uL    HGB 14.5 12.0 - 16.0 g/dL    HCT 41.9 35.0 - 45.0 %    MCV 96.5 74.0 - 97.0 FL    MCH 33.4 24.0 - 34.0 PG    MCHC 34.6 31.0 - 37.0 g/dL    RDW 12.6 11.6 - 14.5 %    PLATELET 845 302 - 350 K/uL    MPV 9.2 9.2 - 11.8 FL    NEUTROPHILS 95 (H) 40 - 73 %    LYMPHOCYTES 4 (L) 21 - 52 %    MONOCYTES 1 (L) 3 - 10 %    EOSINOPHILS 0 0 - 5 %    BASOPHILS 0 0 - 2 %    ABS. NEUTROPHILS 13.4 (H) 1.8 - 8.0 K/UL    ABS. LYMPHOCYTES 0.6 (L) 0.9 - 3.6 K/UL    ABS. MONOCYTES 0.1 0.05 - 1.2 K/UL    ABS. EOSINOPHILS 0.0 0.0 - 0.4 K/UL    ABS. BASOPHILS 0.0 0.0 - 0.06 K/UL    DF AUTOMATED     METABOLIC PANEL, COMPREHENSIVE    Collection Time: 02/08/17  9:22 AM   Result Value Ref Range    Sodium 139 136 - 145 mmol/L    Potassium 3.4 (L) 3.5 - 5.5 mmol/L    Chloride 103 100 - 108 mmol/L    CO2 23 21 - 32 mmol/L    Anion gap 13 3.0 - 18 mmol/L    Glucose 126 (H) 74 - 99 mg/dL    BUN 22 (H) 7.0 - 18 MG/DL    Creatinine 1.22 0.6 - 1.3 MG/DL    BUN/Creatinine ratio 18 12 - 20      GFR est AA 52 (L) >60 ml/min/1.73m2    GFR est non-AA 43 (L) >60 ml/min/1.73m2    Calcium 8.9 8.5 - 10.1 MG/DL    Bilirubin, total 4.6 (H) 0.2 - 1.0 MG/DL    ALT (SGPT) 127 (H) 13 - 56 U/L    AST (SGOT) 119 (H) 15 - 37 U/L    Alk.  phosphatase 86 45 - 117 U/L    Protein, total 6.8 6.4 - 8.2 g/dL    Albumin 3.7 3.4 - 5.0 g/dL    Globulin 3.1 2.0 - 4.0 g/dL    A-G Ratio 1.2 0.8 - 1.7     LIPASE    Collection Time: 02/08/17  9:22 AM   Result Value Ref Range    Lipase 459 (H) 73 - 393 U/L   URINALYSIS W/ RFLX MICROSCOPIC    Collection Time: 02/08/17 10:38 AM   Result Value Ref Range    Color DARK YELLOW      Appearance CLEAR      Specific gravity >1.030 (H) 1.005 - 1.030    pH (UA) 7.5 5.0 - 8.0      Protein NEGATIVE  NEG mg/dL    Glucose NEGATIVE  NEG mg/dL Ketone NEGATIVE  NEG mg/dL    Bilirubin SMALL (A) NEG      Blood NEGATIVE  NEG      Urobilinogen >8.0 (H) 0.2 - 1.0 EU/dL    Nitrites NEGATIVE  NEG      Leukocyte Esterase TRACE (A) NEG     URINE MICROSCOPIC ONLY    Collection Time: 02/08/17 10:38 AM   Result Value Ref Range    WBC 0 to 3 0 - 4 /hpf    RBC 0 0 - 5 /hpf    Epithelial cells 1+ 0 - 5 /lpf    Bacteria NEGATIVE  NEG /hpf       Imaging Reviewed:    CT Abdomen/ Pelvis  2/8/17  IMPRESSION:  1. Small amount of pneumobilia and intraluminal gallbladder contrast in keeping  with recent prior ERCP procedure. No acute intra-abdominal or pelvic abnormality  identified. 2. Small hiatal hernia. 3. Diverticulosis without evidence of diverticulitis    MRCP  1/5/17  IMPRESSION:   Choledocholithiasis without significant ductal dilation. No stones identified in  the gallbladder      Aleksandra Valentino [facility]  Hospitalist Division  Pager:  [phone]  Office:  [phone]

## 2017-02-08 NOTE — ED NOTES
Purposeful rounding completed:    Side rails up x 2:  YES  Bed in low position and wheels locked: YES  Call bell within reach: YES  Comfort addressed: YES    Toileting needs addressed: NO  Plan of care reviewed/updated with patient and or family members: YES  IV site assessed: YES  Pain assessed and addressed: YES, 2    Family at bedside

## 2017-02-08 NOTE — ED NOTES
Attempted to get urine sample but CT was here to pick patient up. Will get sample when patient returns.

## 2017-02-08 NOTE — ED TRIAGE NOTES
Patient with recent gall stone removal c/o of abdominal pain that radiates to her back and up through her shoulders. Patient c/o N/V. Patient coughing up thick, white mucus.

## 2017-02-08 NOTE — IP AVS SNAPSHOT
Current Discharge Medication List  
  
Take these medications at their scheduled times Dose & Instructions Dispensing Information Comments Morning Noon Evening Bedtime  
 amLODIPine 10 mg tablet Commonly known as:  Dk Freedmaner Your next dose is:  Tomorrow Take  by mouth daily. Refills:  0  
     
  
   
   
   
  
 esomeprazole 40 mg capsule Commonly known as:  Kerns Gaunt Your next dose is:  Tomorrow Dose:  40 mg Take 40 mg by mouth two (2) times a day. Refills:  0  
     
  
   
   
   
  
 losartan 100 mg tablet Commonly known as:  COZAAR Your next dose is:  Tomorrow Dose:  100 mg Take 100 mg by mouth daily. Refills:  0  
     
  
   
   
   
  
 metroNIDAZOLE 1 % topical gel Commonly known as:  Christen Kays Apply  to affected area daily. Use a thin layer to affected areas after washing Refills:  0  
     
   
   
   
  
 multivitamin with iron tablet Dose:  1 Tab Take 1 Tab by mouth daily. Refills:  0  
     
   
   
   
  
 nystatin-triamcinolone topical cream  
Commonly known as:  MYCOLOG II Apply  to affected area two (2) times a day. Refills:  0  
     
   
   
   
  
 solifenacin 10 mg tablet Commonly known as:  Yarelis Coleen Dose:  10 mg Take 1 Tab by mouth daily. Quantity:  90 Tab Refills:  3  
     
   
   
   
  
 zolpidem 5 mg tablet Commonly known as:  AMBIEN Dose:  5 mg Take 5 mg by mouth nightly. Refills:  0 Take these medications as needed Dose & Instructions Dispensing Information Comments Morning Noon Evening Bedtime  
 benzonatate 200 mg capsule Commonly known as:  TESSALON Dose:  200 mg Take 200 mg by mouth three (3) times daily as needed for Cough. Refills:  0 DULCOLAX (BISACODYL) PO Take  by mouth as needed. Refills:  0

## 2017-02-08 NOTE — ANESTHESIA POSTPROCEDURE EVALUATION
Post-Anesthesia Evaluation and Assessment    Patient: Twyla Deep MRN: [id number]  SSN: [id number]    YOB: 1940  Age: 68 y.o. Sex: female     VS from flow sheet    Cardiovascular Function/Vital Signs  Visit Vitals    /87    Pulse 60    Temp 36.1 °C (97 °F)    Resp 14    Ht 5' 2\" (1.575 m)    Wt 60.8 kg (134 lb)    SpO2 100%    Breastfeeding No    BMI 24.51 kg/m2       Patient is status post MAC anesthesia for Procedure(s):  ENDOSCOPIC RETROGRADE CHOLANGIOPANCREATOGRAPHY. Nausea/Vomiting: None    Postoperative hydration reviewed and adequate. Pain:  Pain Scale 1: Numeric (0 - 10) (02/07/17 1534)  Pain Intensity 1: 0 (02/07/17 1534)   Managed    Neurological Status: At baseline    Mental Status and Level of Consciousness: Arousable    Pulmonary Status:   O2 Device: Room air (02/07/17 1505)   Adequate oxygenation and airway patent    Complications related to anesthesia: None    Post-anesthesia assessment completed.  No concerns    Signed By: Marina Yu MD     February 8, 2017

## 2017-02-08 NOTE — IP AVS SNAPSHOT
Ary Salinas 
 
 
 [address] [place] [address] 
[phone] Patient: Twyla Kim MRN: [id number] HM You are allergic to the following Allergen Reactions Codeine Other (comments) Chills, Loss of consciousness Norco (Hydrocodone-Acetaminophen) Nausea Only Vistaril (Hydroxyzine Pamoate) Vertigo Recent Documentation Weight BMI OB Status Smoking Status 61.4 kg 24.76 kg/m2 Postmenopausal Former Smoker Emergency Contacts Name Discharge Info Relation Home Work Mobile [address] [facility] CAREGIVER [3] Daughter [21]   [phone] About your hospitalization You were admitted on:  2017 You last received care in the:  [address] You were discharged on:  February 10, 2017 Unit phone number:  [phone] Why you were hospitalized Your primary diagnosis was:  Choledocholithiasis Your diagnoses also included:  Incontinence, Hypertension, Hyperlipidemia, History Of Gastrointestinal Hemorrhage, Gerd (Gastroesophageal Reflux Disease), Alfonso (Generalized Anxiety Disorder), Ckd (Chronic Kidney Disease), Stage Iii, Persistent Vomiting, Elevated Lfts, Leukocytosis Providers Seen During Your Hospitalizations Provider Role Specialty Primary office phone Pete Mtz MD Attending Provider Emergency Medicine [phone] Darrick Saba DO Attending Provider Internal Medicine [phone] Your Primary Care Physician (PCP) Primary Care Physician Office Phone Office Fax Aden Perkins [phone] Follow-up Information Follow up With Details Comments Contact Info Amol See MD Schedule an appointment as soon as possible for a visit in 4 week  [address] [place]  Dr [facility] [address] [facility] 83 [address] [phone] Ramon Kirby MD Schedule an appointment as soon as possible for a visit  [address] [facility] 83 [address] 
[phone] Current Discharge Medication List  
  
CONTINUE these medications which have NOT CHANGED Dose & Instructions Dispensing Information Comments Morning Noon Evening Bedtime  
 amLODIPine 10 mg tablet Commonly known as:  Suzon Salle Your next dose is:  Tomorrow Take  by mouth daily. Refills:  0  
     
  
   
   
   
  
 benzonatate 200 mg capsule Commonly known as:  TESSALON Dose:  200 mg Take 200 mg by mouth three (3) times daily as needed for Cough. Refills:  0 DULCOLAX (BISACODYL) PO Take  by mouth as needed. Refills:  0  
     
   
   
   
  
 esomeprazole 40 mg capsule Commonly known as:  Kristina Reyes Your next dose is:  Tomorrow Dose:  40 mg Take 40 mg by mouth two (2) times a day. Refills:  0  
     
  
   
   
   
  
 losartan 100 mg tablet Commonly known as:  COZAAR Your next dose is:  Tomorrow Dose:  100 mg Take 100 mg by mouth daily. Refills:  0  
     
  
   
   
   
  
 metroNIDAZOLE 1 % topical gel Commonly known as:  Carlita [place] Apply  to affected area daily. Use a thin layer to affected areas after washing Refills:  0  
     
   
   
   
  
 multivitamin with iron tablet Dose:  1 Tab Take 1 Tab by mouth daily. Refills:  0  
     
   
   
   
  
 nystatin-triamcinolone topical cream  
Commonly known as:  MYCOLOG II Apply  to affected area two (2) times a day. Refills:  0  
     
   
   
   
  
 solifenacin 10 mg tablet Commonly known as:  Wan Dears Dose:  10 mg Take 1 Tab by mouth daily. Quantity:  90 Tab Refills:  3  
     
   
   
   
  
 zolpidem 5 mg tablet Commonly known as:  AMBIEN Dose:  5 mg Take 5 mg by mouth nightly. Refills:  0 STOP taking these medications   
 amoxicillin 875 mg tablet Commonly known as:  AMOXIL Discharge Instructions Colangiopancreatografía retrógrada endoscópica: Antes del procedimiento - [ Endoscopic Retrograde Cholangiopancreatogram (ERCP): Before Your Procedure ] Qué es anita colangiopancreatografía retrógrada endoscópica? Anita colangiopancreatografía retrógrada endoscópica (ERCP, por georgiana siglas en inglés) es anita prueba para examinar los tubos que transportan líquidos desde el hígado, la vesícula biliar y el páncreas. Estos tubos se llaman conductos. Para hacer esta prueba, el médico utilizará un instrumento llamado endoscopio. Es un tubo lawson y flexible con jairo. Tiene anita cámara que le permite al médico utilizar imágenes en anita pantalla para guiarlo. Esta prueba se hace por diferentes razones. Puede ayudar a determinar la causa de georgiana síntomas. Si la prueba muestra cálculos biliares o un punto estrecho en un conducto biliar, el médico puede usar el endoscopio para extraer los cálculos o ensanchar el conducto. También podría colocar un tubo de metal o de plástico en el conducto. [place] puede ayudar a ensancharlo. Antes de la prueba, es posible que le administren un medicamento para adormecerle el fondo de la garganta. También le darán medicamentos para ayudarle a relajarse. Jeni la prueba, usted estará [address] rios lado shaji o boca abajo. El médico le colocará el endoscopio en la boca y luego lo moverá suavemente hacia la parte trasera de la garganta. El médico le dirá cuándo debe tragar. [place] ayudará a que el endoscopio baje por rios garganta. Usted podrá respirar con normalidad. A continuación, el médico moverá el endoscopio a lo brando del tubo (esófago) que conduce hasta el estómago, a través del estómago y hasta la primera parte del intestino lawson.  Cuando el endoscopio alcance el lugar en que los conductos biliares y el páncreas se encuentran con el intestino lawson, usted podría darse la vuelta y Joslyn Pleva stan[place] abdomen. Entonces, el médico colocará un pequeño tubo de plástico dentro del endoscopio para inyectarle un material de [facility]. Manda material de [employer] a que los conductos puedan verse en las radiografías (ly X). Después, el médico miguel radiografías para ayudar a detectar cualquier problema. La prueba dura entre 30 minutos y 2 horas. Es posible que regrese a [place] hogar el mismo día. Rebecca si le hicieron un tratamiento danay la prueba, podría tener que pasar la noche en el hospital. 
La atención de seguimiento es anita parte clave de [place] tratamiento y seguridad. Asegúrese de hacer y acudir a todas las citas, y llame a [place] médico si está teniendo problemas. También es anita buena idea saber los resultados de los exámenes y mantener anita lista de los medicamentos que miguel. Dsouza Cadet antes del procedimiento? Los procedimientos pueden ser estresantes. Esta información le ayudará a entender qué puede esperar. Y le ayudará a prepararse de manera hector para [place] procedimiento. Cómo prepararse para el procedimiento · Entienda exactamente qué procedimiento está planificado, junto con los [place], los beneficios y las otras [address] · Infórmeles a georgiana médicos sobre [employer], las vitaminas, los suplementos y los bertram herbarios que miguel. Algunos de [employer] aumentar el riesgo de sangrado o interactuar con la anestesia. · Si miguel anticoagulantes, srinivasan warfarina (Coumadin), clopidogrel (Plavix) o aspirina, asegúrese de hablar con [place] médico. Le dirá si debe dejar de batsheva estos medicamentos antes del procedimiento. Asegúrese de entender exactamente lo que [place] médico quiere que nehemias. · [place] médico le dirá qué medicamentos batsheva o dejar de batsheva antes del procedimiento. Es posible que deba dejar de batsheva ciertos medicamentos anita semana o más antes del procedimiento, así que hable con [place] médico tan pronto srinivasan pueda. · Informe a rios médico si tiene instrucciones médicas por anticipado. Estas pueden incluir un testamento vital y un poder legal permanente para la atención médica. Lleve consigo anita copia cuando vaya al hospital. Si no las tiene, sería conveniente prepararlas. Verlon Devi [place], [place] médico y seres queridos sabrán georgiana deseos sobre la [address]. Los médicos recomiendan que todas las personas preparen estos documentos antes de cualquier tipo de [place] o procedimiento. [place] el día del procedimiento? · Siga en forma precisa las instrucciones sobre cuándo debe dejar de comer y beber. Si no lo hace, el procedimiento podría ser cancelado. Si rios médico le dijo que tome georgiana medicamentos el día del procedimiento, tómelos con un solo sorbo de agua. · Báñese o dúchese antes de acudir al hospital para el procedimiento. No use lociones, perfumes, desodorantes ni esmalte de uñas. · Glee Reasons todas las joyas y los \"piercings\". Si lleva lentes de contacto, quíteselos también. En el hospital o centro quirúrgico 
· Lleve un documento de identidad con foto. · Es posible que se le dé un medicamento que lo relajará o adormecerá ligeramente. La alexandria que se va a tratar estará adormecida. · Dígale al médico si es alérgico al yodo. Manda se encuentra en el material de contraste que el médico introduce en los conductos biliares. · Para poder michelle mejor, el médico podría insuflar pequeñas ráfagas de aire a través del tubo. [place] podría hacerle sentir abotagado. También podría tener cólicos. Esta sensación no dura por [employer]. · Podría sentir un poco de abotagamiento o cólicos a medida que el tubo se [place]. Si siente muchas molestias, puede avisarle al [employer] mediante anita señal que hayan acordado previamente o con tocarle el brazo. · Luego, permanecerá en el hospital o la clínica danay 1 o 2 horas, hasta que el efecto del medicamento haya desaparecido. El regreso a rios hogar · Asegúrese de que alguien lo lleve a rios hogar. La anestesia y los analgésicos (medicamentos para el dolor) hacen que no sea seguro que usted Gustavo Ibarra. · Recibirá instrucciones más específicas acerca de la recuperación del procedimiento. Cubrirán [employer] alimentación, el cuidado de las heridas, la atención de seguimiento, el manejo de vehículos y la vuelta a rios rutina habitual. 

Cuándo debe llamar a rios médico? 
· Tiene preguntas o inquietudes. · No entiende cómo prepararse para el procedimiento. · Se enferma antes del procedimiento (por ejemplo, tiene fiebre, un resfriado o gripe). · Necesita reprogramar el procedimiento o cambió de opinión acerca de hacerse el procedimiento. Dónde puede encontrar más información en inglés? Sam Clarke a [email or web address]://adele-[email or web address].info/. Escriba F420 en la búsqueda para aprender más acerca de \"Colangiopancreatografía retrógrada endoscópica: Antes del procedimiento - [ Endoscopic Retrograde Cholangiopancreatogram (ERCP): Before Your Procedure ]. \" 
Revisado: 9 agosto, 2016 Versión del contenido: 11.1 © 5805-7970 Healthwise, Incorporated. Las instrucciones de cuidado fueron adaptadas bajo licencia por [employer] (which disclaims liability or warranty for this information). Si usted tiene Kane Spray afección médica o sobre estas instrucciones, siempre pregunte a rios profesional de marcia. Healthwise, Incorporated niega toda garantía o responsabilidad por rios uso de esta información. .Patient armband removed and shredded DISCHARGE SUMMARY from Nurse The following personal items are in your possession at time of discharge: 
 
Dental Appliances: Partials Home Medications: None Jewelry: Watch Clothing: Judson Brome Other Valuables: Montes De Oca Jasso PATIENT INSTRUCTIONS: 
 
 
F-face looks uneven A-arms unable to move or move unevenly S-speech slurred or non-existent T-time-call 911 as soon as signs and symptoms begin-DO NOT go Back to bed or wait to see if you get better-TIME IS BRAIN. Warning Signs of HEART ATTACK Call 911 if you have these symptoms: 
? Chest discomfort. Most heart attacks involve discomfort in the center of the chest that lasts more than a few minutes, or that goes away and comes back. It can feel like uncomfortable pressure, squeezing, fullness, or pain. ? Discomfort in other areas of the upper body. Symptoms can include pain or discomfort in one or both arms, the back, neck, jaw, or stomach. ? Shortness of breath with or without chest discomfort. ? Other signs may include breaking out in a cold sweat, nausea, or lightheadedness. Don't wait more than five minutes to call [address]! Fast action can save your life. Calling 911 is almost always the fastest way to get lifesaving treatment. Emergency Medical Services staff can begin treatment when they arrive  up to an hour sooner than if someone gets to the hospital by car. The discharge information has been reviewed with the patient. The patient verbalized understanding. Discharge medications reviewed with the patient and appropriate educational materials and side effects teaching were provided. Discharge Orders None [facility] Announcement We are excited to announce that we are making your provider's discharge notes available to you in KDS. You will see these notes when they are completed and signed by the physician that discharged you from your recent hospital stay.   If you have any questions or concerns about any information you see in KDS, please call the [place] Department where you were seen or reach out to your Primary Care Provider for more information about your plan of care. Introducing [facility] [place] [facility]! [facility] introduce portal paciente MyChart . Ahora se puede acceder a partes de rios expediente médico, enviar por correo electrónico la oficina de rios médico y solicitar renovaciones de medicamentos en línea. En rios navegador de Internet , Enolia Lieu a [email or web address] Nehemias clic en el usuario por Marino Hamman? Agustin Avon clic aquí en la sesión Lisa Bridgette. Verá la página de registro [place]. Ingrese rios código de [facility] Nadege katharina y srinivasan aparece a continuación. Usted no tendrá que [employer] código después de kristyn completado el proceso de registro . Si usted no se inscribe antes de la fecha de caducidad , debe solicitar un nuevo código. · MyChart Código de acceso : [id number] Expires: 2/28/2017 11:27 AM 
 
Ingresa los últimos cuatro dígitos de rios Número de Seguro Social ( xxxx ) y fecha de nacimiento ( dd / mm / aaaa ) srinivasan se indica y nehemias clic en Enviar. Usted será llevado a la siguiente página de registro . Crear un ID MyChart . Esta será rios ID de inicio de sesión de MyChart y no puede ser [place] , por lo que pensar en anita que es Sharlette Handy y fácil de recordar . Crear anita contraseña MyChart . Usted puede cambiar rios contraseña en cualquier momento . Ingrese rios Password Reset de preguntas y [place] . [place] se puede utilizar en un momento posterior si usted olvida rios contraseña. Introduzca rios dirección de correo electrónico . Donna Estimable recibirá anita notificación por correo electrónico cuando la nueva información está disponible en MyChart . Chuy lantigua en Registrarse. Sarah Hoang michelle y descargar porciones de rios expediente médico. 
Nehemias grzegorz en el enlace de descarga del menú Resumen para descargar anita copia portátil de rios información médica . Si tiene Kimberlee [employer] , por favor visite la sección de preguntas adrienne del sitio web MyChart . Chaz, [place] NO es que se utilizará para las necesidades urgentes. Para emergencias médicas , marylu al 911 . Ahora disponible en rios iPhone y Android ! General Information Please provide this summary of care documentation to your next provider. Patient Signature:  ____________________________________________________________ Date:  ____________________________________________________________  
  
Gigi Skinner Provider Signature:  ____________________________________________________________ Date:  ____________________________________________________________  
  
  
   
  
[place] [phone] Patient: Akiko Ceballos MRN: [id number] EKD:[id number] Tiene alergias a lo siguiente Shawanda Dereje Codeine Other (comments) Chills, Loss of consciousness Norco (Hydrocodone-Acetaminophen) Nausea Only Vistaril (Hydroxyzine Pamoate) Vertigo Documentación recientes Weight BMI (IMC) Estado obstétrico Estatus de tabaquísmo 61.4 kg 24.76 kg/m2 Postmenopausal Former Smoker Emergency Contacts Name Discharge Info Relation Home Work Mobile 14[address] [facility] CAREGIVER [3] Daughter [21]   [phone] Sobre rios hospitalización Le admitieron el:  February 8, 2017 Rios tratamiento más reciente fue el:  [facility] 3S CARDIAC TELE Le dieron de aleksandar el:  February 10, 2017 Número de teléfono de la unidad:  [phone] Por qué le ingresaron Rios diagnosis primaria es:  Choledocholithiasis Rios diagnosis también incluye:  Incontinence, Hypertension, Hyperlipidemia, History Of Gastrointestinal Hemorrhage, Gerd (Gastroesophageal Reflux Disease), Alfonso (Generalized Anxiety Disorder), Ckd (Chronic Kidney Disease), Stage Iii, Persistent Vomiting, Elevated Lfts, Leukocytosis Proveedores de verse danay georgiana hospitalizaciones Personal Médico Rol Especialidad Teléfono principal de la oficina Shahana Hendrix MD Attending Provider Emergency Medicine [phone] Keshia Malloy DO Attending Provider Internal Medicine [phone] [place] médico de atención primaria (PCP ) Primary Care Physician Office Phone Office Fax De Joel [phone] Follow-up Information Follow up With Details Comments Contact Info Dave Good MD Schedule an appointment as soon as possible for a visit in 4 week  [address]  Dr [facility] [address] [place] [address] [address] 
[phone] Jag Montanez MD Schedule an appointment as soon as possible for a visit  [address] [facility] [address] [place] [address] [address] [phone] Aprobación de la gestión actual lista de medicamentos CONTINUAR estos medicamentos que no [place] Dosis e instrucciones Información de dispensación Comentarios Morning Noon Evening Bedtime  
 amLODIPine 10 mg tablet También conocido srinivasan:  Izetta Harder Your next dose is:  Tomorrow Take  by mouth daily. recargas:  0  
     
  
   
   
   
  
 benzonatate 200 mg capsule También conocido srinivasan:  TESSALON Dosis:  200 mg Take 200 mg by mouth three (3) times daily as needed for Cough. recargas:  0 DULCOLAX (BISACODYL) PO Take  by mouth as needed. recargas:  0  
     
   
   
   
  
 esomeprazole 40 mg capsule También conocido srinivasan:  Izzy Legato Your next dose is:  Tomorrow Dosis:  40 mg Take 40 mg by mouth two (2) times a day. recargas:  0  
     
  
   
   
   
  
 losartan 100 mg tablet También conocido srinivasan:  COZAAR Your next dose is:  Tomorrow Dosis:  100 mg Take 100 mg by mouth daily. recargas:  0  
     
  
   
   
   
  
 metroNIDAZOLE 1 % topical gel También conocido srinivasan:  Linda Gaitan  
   
 Apply  to affected area daily. Use a thin layer to affected areas after washing  
 recargas:  0  
     
   
   
   
  
 multivitamin with iron tablet Dosis:  1 Tab Take 1 Tab by mouth daily. recargas:  0  
     
   
   
   
  
 nystatin-triamcinolone topical cream  
También conocido srinivasan:  MYCOLOG II Apply  to affected area two (2) times a day. recargas:  0  
     
   
   
   
  
 solifenacin 10 mg tablet También conocido srinivasan:  Ranjeet Lacey Dosis:  10 mg Take 1 Tab by mouth daily. cantidad:  90 Tab  
recargas:  3  
     
   
   
   
  
 zolpidem 5 mg tablet También conocido srinivasan:  Josselin Acre Dosis:  5 mg Take 5 mg by mouth nightly. recargas:  0 DEJE de batsheva estos medicamentos   
 amoxicillin 875 mg tablet También conocido srinivasan:  AMOXIL Discharge Instructions Colangiopancreatografía retrógrada endoscópica: Antes del procedimiento - [ Endoscopic Retrograde Cholangiopancreatogram (ERCP): Before Your Procedure ] Qué es anita colangiopancreatografía retrógrada endoscópica? Anita colangiopancreatografía retrógrada endoscópica (ERCP, por georgiana siglas en inglés) es anita prueba para examinar los tubos que transportan líquidos desde el hígado, la vesícula biliar y el páncreas. Estos tubos se llaman conductos. Para hacer esta prueba, el médico utilizará un instrumento llamado endoscopio. Es un tubo lawson y flexible con jairo. Tiene anita cámara que le permite al médico utilizar imágenes en anita pantalla para guiarlo. Esta prueba se hace por diferentes razones. Puede ayudar a determinar la causa de georgiana síntomas. Si la prueba muestra cálculos biliares o un punto estrecho en un conducto biliar, el médico puede usar el endoscopio para extraer los cálculos o ensanchar el conducto. También podría colocar un tubo de metal o de plástico en el conducto. [place] puede ayudar a ensancharlo. Antes de la prueba, es posible que le administren un medicamento para adormecerle el fondo de la garganta. También le darán medicamentos para ayudarle a relajarse. Danay la prueba, usted estará [address] rios lado shaji o boca abajo. El médico le colocará el endoscopio en la boca y luego lo moverá suavemente hacia la parte trasera de la garganta. El médico le dirá cuándo debe tragar. [place] ayudará a que el endoscopio baje por rios garganta. Usted podrá respirar con normalidad. A continuación, el médico moverá el endoscopio a lo brando del tubo (esófago) que conduce hasta el estómago, a través del estómago y hasta la primera parte del intestino lawson. Cuando el endoscopio alcance el lugar en que los conductos biliares y el páncreas se encuentran con el intestino lawson, usted podría darse la vuelta y Eldon Deems rios abdomen. Entonces, el médico colocará un pequeño tubo de plástico dentro del endoscopio para inyectarle un material de [facility]. Manda material de [employer] a que los conductos puedan verse en las radiografías (ly X). Después, el médico miguel radiografías para ayudar a detectar cualquier problema. La prueba dura entre 30 minutos y 2 horas. Es posible que regrese a rios hogar el mismo día. Rebecca si le hicieron un tratamiento danay la prueba, podría tener que pasar la noche en el hospital. 
La atención de seguimiento es anita parte clave de rios tratamiento y seguridad. Asegúrese de hacer y acudir a todas las citas, y llame a rios médico si está teniendo problemas. También es anita buena idea saber los resultados de los exámenes y mantener anita lista de los medicamentos que miguel. Suni Couch antes del procedimiento? Los procedimientos pueden ser estresantes. Esta información le ayudará a entender qué puede esperar. Y le ayudará a prepararse de manera hector para rios procedimiento. Cómo prepararse para el procedimiento · Entienda exactamente qué procedimiento está planificado, junto con los [place], los beneficios y las otras [address] · Infórmeles a georgiana médicos sobre [employer], las vitaminas, los suplementos y los bertram herbarios que miguel. Algunos de [employer] aumentar el riesgo de sangrado o interactuar con la anestesia. · Si miguel anticoagulantes, srinivasan warfarina (Coumadin), clopidogrel (Plavix) o aspirina, asegúrese de hablar con rios médico. Le dirá si debe dejar de batsheva estos medicamentos antes del procedimiento. Asegúrese de entender exactamente lo que rios médico quiere que nehemias. · Rios médico le dirá qué medicamentos batsheva o dejar de batsheva antes del procedimiento. Es posible que deba dejar de batsheva ciertos medicamentos anita semana o más antes del procedimiento, así que hable con rios médico tan pronto srinivasan pueda. · Informe a rios médico si tiene instrucciones médicas por anticipado. Estas pueden incluir un testamento vital y un poder legal permanente para la atención médica. Lleve consigo anita copia cuando vaya al hospital. Si no las tiene, sería conveniente prepararlas. [place], [place] médico y seres queridos sabrán georgiana deseos sobre la [address] [facility]. Los médicos recomiendan que todas las personas preparen estos documentos antes de cualquier tipo de [place] o procedimiento. Susanne Yovani el día del procedimiento? · Siga en forma precisa las instrucciones sobre cuándo debe dejar de comer y beber. Si no lo hace, el procedimiento podría ser cancelado. Si rios médico le dijo que tome georgiana medicamentos el día del procedimiento, tómelos con un solo sorbo de agua. · Báñese o dúchese antes de acudir al hospital para el procedimiento. No use lociones, perfumes, desodorantes ni esmalte de uñas. · Emely Mohair todas las joyas y los \"piercings\". Si lleva lentes de contacto, quíteselos también. En el hospital o centro quirúrgico 
· Lleve un documento de identidad con foto. · Es posible que se le dé un medicamento que lo relajará o adormecerá ligeramente. La alexandria que se va a tratar estará adormecida. · Dígale al médico si es alérgico al yodo. Manda se encuentra en el material de contraste que el médico introduce en los conductos biliares. · Para poder michelle mejor, el médico podría insuflar pequeñas ráfagas de aire a través del tubo. [place] podría hacerle sentir abotagado. También podría tener cólicos. Esta sensación no dura por [employer]. · Podría sentir un poco de abotagamiento o cólicos a medida que el tubo se [place]. Si siente muchas molestias, puede avisarle al [employer] mediante anita señal que hayan acordado previamente o con tocarle el brazo. · Luego, permanecerá en el hospital o la clínica danay 1 o 2 horas, hasta que el efecto del medicamento haya desaparecido. El regreso a rios hogar · Asegúrese de que alguien lo lleve a rios hogar. La anestesia y los analgésicos (medicamentos para el dolor) hacen que no sea seguro que usted Gustavo Blackwellley. · Recibirá instrucciones más específicas acerca de la recuperación del procedimiento. Cubrirán [employer] alimentación, el cuidado de las heridas, la atención de seguimiento, el manejo de vehículos y la vuelta a rios rutina habitual. 

Cuándo debe llamar a rios médico? 
· Tiene preguntas o inquietudes. · No entiende cómo prepararse para el procedimiento. · Se enferma antes del procedimiento (por ejemplo, tiene fiebre, un resfriado o gripe). · Necesita reprogramar el procedimiento o cambió de opinión acerca de hacerse el procedimiento. Dónde puede encontrar más información en inglés? Dea Romeroo a http://adele-gardenia.info/. Escriba F420 en la búsqueda para aprender más acerca de \"Colangiopancreatografía retrógrada endoscópica: Antes del procedimiento - [ Endoscopic Retrograde Cholangiopancreatogram (ERCP): Before Your Procedure ]. \" 
Revisado: 9 agosto, 2016 Versión del contenido: 11.1 © 9089-0902 Healthwise, Incorporated. Las instrucciones de cuidado fueron adaptadas bajo licencia por [employer] (which disclaims liability or warranty for this information). Si usted tiene [place] afección médica o sobre estas instrucciones, siempre pregunte a rios profesional de marcia. Healthwise, Incorporated niega toda garantía o responsabilidad por rios uso de esta información. .Patient armband removed and shredded DISCHARGE SUMMARY from Nurse The following personal items are in your possession at time of discharge: 
 
Dental Appliances: Partials Home Medications: None Jewelry: Watch Clothing: Evorn Atilio Other Valuables: Georga Counts PATIENT INSTRUCTIONS: 
 
After general anesthesia or intravenous sedation, for 24 hours or while taking prescription Narcotics: · Limit your activities · Do not drive and operate hazardous machinery · Do not make important personal or business decisions · Do  not drink alcoholic beverages · If you have not urinated within 8 hours after discharge, please contact your surgeon on call. Report the following to your surgeon: 
· Excessive pain, swelling, redness or odor of or around the surgical area · Temperature over 100.5 · Nausea and vomiting lasting longer than 4 hours or if unable to take medications · Any signs of decreased circulation or nerve impairment to extremity: change in color, persistent  numbness, tingling, coldness or increase pain · Any questions What to do at Home: 
Recommended activity: Activity as tolerated, If you experience any of the following symptoms severe abdominal pain, worsening nausea or vomiting, fever greater 100.5, please follow up with PCP or call 911. *  Please give a list of your current medications to your Primary Care Provider.  
 
*  Please update this list whenever your medications are discontinued, doses are 
 changed, or new medications (including over-the-counter products) are added. *  Please carry medication information at all times in case of emergency situations. These are general instructions for a healthy lifestyle: No smoking/ No tobacco products/ Avoid exposure to second hand smoke Surgeon General's Warning:  Quitting smoking now greatly reduces serious risk to your health. Obesity, smoking, and sedentary lifestyle greatly increases your risk for illness A healthy diet, regular physical exercise & weight monitoring are important for maintaining a healthy lifestyle You may be retaining fluid if you have a history of heart failure or if you experience any of the following symptoms:  Weight gain of 3 pounds or more overnight or 5 pounds in a week, increased swelling in our hands or feet or shortness of breath while lying flat in bed. Please call your doctor as soon as you notice any of these symptoms; do not wait until your next office visit. Recognize signs and symptoms of STROKE: 
 
F-face looks uneven A-arms unable to move or move unevenly S-speech slurred or non-existent T-time-call 911 as soon as signs and symptoms begin-DO NOT go Back to bed or wait to see if you get better-TIME IS BRAIN. Warning Signs of HEART ATTACK Call 911 if you have these symptoms: 
? Chest discomfort. Most heart attacks involve discomfort in the center of the chest that lasts more than a few minutes, or that goes away and comes back. It can feel like uncomfortable pressure, squeezing, fullness, or pain. ? Discomfort in other areas of the upper body. Symptoms can include pain or discomfort in one or both arms, the back, neck, jaw, or stomach. ? Shortness of breath with or without chest discomfort. ? Other signs may include breaking out in a cold sweat, nausea, or lightheadedness. Don't wait more than five minutes to call [address]!  Fast action can save your life. Calling 911 is almost always the fastest way to get lifesaving treatment. Emergency Medical Services staff can begin treatment when they arrive  up to an hour sooner than if someone gets to the hospital by car. The discharge information has been reviewed with the patient. The patient verbalized understanding. Discharge medications reviewed with the patient and appropriate educational materials and side effects teaching were provided. Discharge Orders [employer] Announcement We are excited to announce that we are making your provider's discharge notes available to you in [employer]. You will see these notes when they are completed and signed by the physician that discharged you from your recent hospital stay. If you have any questions or concerns about any information you see in MediSens, please call the Health Information Department where you were seen or reach out to your Primary Care Provider for more information about your plan of care. Introducing [facility]! [facility] introduce portal paciente MediS[employer] . Ahora se puede acceder a partes de rios expediente médico, enviar por correo electrónico la oficina de rios médico y solicitar renovaciones de medicamentos en línea. En rios navegador de Internet , Kerrie Avelar a [email or web address] Lisbeth clic en el usuario por Arelia Arms? Yalobusha Shells clic aquí en la sesión [facility]. Verá la página de registro Frost. Ingrese rios código de [employer] katharina y srinivasan aparece a continuación. Usted no tendrá que [employer] código después de kristyn completado el proceso de registro . Si usted no se inscribe antes de la fecha de caducidad , debe solicitar un nuevo código. · MediSens Código de acceso : [id number] Expires: 2/28/2017 11:27 AM 
 
Ingresa los últimos cuatro dígitos de rios Número de Seguro Social ( xxxx ) y fecha de nacimiento ( dd / mm / aaaa ) srinivasan se indica y lisbeth clic en Enviar. Usted será llevado a la siguiente página de registro . Crear un ID MyChart . Esta será rios ID de inicio de sesión de MyChart y no puede ser [place] , por lo que pensar en anita que es [place] y fácil de recordar . Crear anita contraseña MyChart . Usted puede cambiar rios contraseña en cualquier momento . Ingrese rios Password Reset de preguntas y [place] . [place] se puede utilizar en un momento posterior si usted olvida rios contraseña. Introduzca rios dirección de correo electrónico . Debra Duran recibirá anita notificación por correo electrónico cuando la nueva información está disponible en MyChart . Josefa Castañedasen clic en Registrarse. Mary Conestee michelle y descargar porciones de rios expediente médico. 
Lisbeth clic en el enlace de descarga del menú Resumen para descargar anita copia portátil de rios información médica . Si tiene [employer] , por favor visite la sección de preguntas frecuentes del sitio web MyChart . Recuerde, MyChart NO es que se utilizará para las necesidades urgentes. Para emergencias médicas , llame al 911 . Ahora disponible en rios iPhone y Android ! General Information Please provide this summary of care documentation to your next provider. Patient Signature:  ____________________________________________________________ Date:  ____________________________________________________________  
  
Joelene Batman Provider Signature:  ____________________________________________________________ Date:  ____________________________________________________________

## 2017-02-08 NOTE — ED PROVIDER NOTES
HPI Comments: 9:23 AM Daryn Borrero is a 68 y.o. female with h/o GERD, HTN, gastritis, gall stones, and  who presents to ED via EMS complaining of abdominal pain onset yesterday. Pt had an ERCP with multiple stones removed yesterday. Pt was sent home after the procedure. Pt also admits to vomiting and says her abdominal pain radiates to her back and legs. No other concerns or symptoms at this time. PCP: Callie Marshall MD    The history is provided by the patient. Past Medical History:   Diagnosis Date    Abnormal LFTs     Acute neck sprain     Bursitis      right shoulder    Cervicalgia     Chest pain     CKD (chronic kidney disease), stage III     Dyslipidemia     Dyspnea     PATRICIA (generalized anxiety disorder)     Gallstones     Gastritis     Gastritis with bleeding     GERD (gastroesophageal reflux disease)     Hordeolum     Hyperlipidemia     Hypertension     Incontinence     Insomnia     OAB (overactive bladder)     Pruritus        Past Surgical History:   Procedure Laterality Date    Hx appendectomy      Hx hysterectomy      Pr appendectomy      Hx endoscopy      Hx colonoscopy           Family History:   Problem Relation Age of Onset    No Known Problems Brother        Social History     Social History    Marital status:      Spouse name: N/A    Number of children: N/A    Years of education: N/A     Occupational History    Not on file. Social History Main Topics    Smoking status: Former Smoker     Quit date: 1/1/1980    Smokeless tobacco: Never Used    Alcohol use Yes      Comment: occassionally    Drug use: No    Sexual activity: Not on file     Other Topics Concern    Not on file     Social History Narrative         ALLERGIES: Codeine; Norco [hydrocodone-acetaminophen]; and Vistaril [hydroxyzine pamoate]    Review of Systems   Constitutional: Negative for fever. HENT: Negative for trouble swallowing.     Respiratory: Negative for shortness of breath. Cardiovascular: Negative for chest pain. Gastrointestinal: Positive for abdominal pain, nausea and vomiting. Negative for diarrhea. Genitourinary: Negative for difficulty urinating. Musculoskeletal: Positive for back pain and myalgias (legs bilaterally). Negative for neck pain. Skin: Negative for wound. Neurological: Negative for syncope. Psychiatric/Behavioral: Negative for behavioral problems. All other systems reviewed and are negative. Vitals:    02/08/17 1109 02/08/17 1130 02/08/17 1200 02/08/17 1445   BP: 131/65 103/71 121/77 116/59   Pulse:  95 90 80   Resp:  19 30 20   Temp:       SpO2:  99% 98% 97%            Physical Exam   Constitutional: She is oriented to person, place, and time. She appears well-developed. Uncomfortable-appearing   HENT:   Head: Normocephalic and atraumatic. Eyes: EOM are normal.   Neck: Normal range of motion. Cardiovascular: Intact distal pulses. Tachycardia present. Pulmonary/Chest: Effort normal and breath sounds normal. No respiratory distress. Abdominal: Soft. There is generalized tenderness. Musculoskeletal: Normal range of motion. She exhibits no edema. Right lower leg: She exhibits no edema. Left lower leg: She exhibits no edema. Mechanically stable   Neurological: She is alert and oriented to person, place, and time. No focal deficits noted   Psychiatric: Her behavior is normal.   Nursing note and vitals reviewed. MDM  Number of Diagnoses or Management Options  Hyperbilirubinemia:   Non-intractable vomiting with nausea, unspecified vomiting type:   S/P ERCP:   Diagnosis management comments: 67 yo CF with PMHx recent ERCP with gallstone removal present with abdominal pain and vomiting. Examination significant for tachycardia, generalized abdominal tenderness, and active vomiting in ED. Will evaluate for acute process. 1.  Cbc, cmp, lipase  2.  ua  3. Ct a/p  4. IVF  5. Symptom management  6. Re-evaluate    4:03 PM  Tbili, LFT's, lipase all elevated. CT with pneumobilia and contrast in gallbladder, likely s/p ERCP. I spoke with Dr. Candida Valentin, GI, who recommended admission. I discussed with Chanel Medel, hospitalist, who agreed to see pt for admission. Updated pt and family on results and poc.  Pt feeling better, resting comfortably       Amount and/or Complexity of Data Reviewed  Clinical lab tests: ordered and reviewed  Tests in the radiology section of CPT®: ordered and reviewed  Obtain history from someone other than the patient: yes  Discuss the patient with other providers: yes  Independent visualization of images, tracings, or specimens: yes      ED Course       Procedures    Vitals:  Patient Vitals for the past 12 hrs:   Temp Pulse Resp BP SpO2   02/08/17 1445 - 80 20 116/59 97 %   02/08/17 1200 - 90 30 121/77 98 %   02/08/17 1130 - 95 19 103/71 99 %   02/08/17 1109 - - - 131/65 -   02/08/17 1030 - 92 26 112/48 95 %   02/08/17 1000 - 95 27 118/45 99 %   02/08/17 0930 - 91 27 125/48 96 %   02/08/17 0928 97.7 °F (36.5 °C) 93 28 117/61 95 %         Medications ordered:   Medications   sodium chloride 0.9 % bolus infusion 1,000 mL (0 mL IntraVENous IV Completed 2/8/17 1042)   ondansetron (ZOFRAN) injection 4 mg (4 mg IntraVENous Given 2/8/17 0936)   iopamidol (ISOVUE 300) 61 % contrast injection 100 mL (80 mL IntraVENous Given 2/8/17 1047)   morphine injection 4 mg (4 mg IntraVENous Given 2/8/17 1159)   sodium chloride 0.9 % bolus infusion 1,000 mL (1,000 mL IntraVENous New Bag 2/8/17 1601)         Lab findings:  Recent Results (from the past 12 hour(s))   CBC WITH AUTOMATED DIFF    Collection Time: 02/08/17  9:22 AM   Result Value Ref Range    WBC 14.2 (H) 4.6 - 13.2 K/uL    RBC 4.34 4.20 - 5.30 M/uL    HGB 14.5 12.0 - 16.0 g/dL    HCT 41.9 35.0 - 45.0 %    MCV 96.5 74.0 - 97.0 FL    MCH 33.4 24.0 - 34.0 PG    MCHC 34.6 31.0 - 37.0 g/dL    RDW 12.6 11.6 - 14.5 %    PLATELET 341 842 - 763 K/uL MPV 9.2 9.2 - 11.8 FL    NEUTROPHILS 95 (H) 40 - 73 %    LYMPHOCYTES 4 (L) 21 - 52 %    MONOCYTES 1 (L) 3 - 10 %    EOSINOPHILS 0 0 - 5 %    BASOPHILS 0 0 - 2 %    ABS. NEUTROPHILS 13.4 (H) 1.8 - 8.0 K/UL    ABS. LYMPHOCYTES 0.6 (L) 0.9 - 3.6 K/UL    ABS. MONOCYTES 0.1 0.05 - 1.2 K/UL    ABS. EOSINOPHILS 0.0 0.0 - 0.4 K/UL    ABS. BASOPHILS 0.0 0.0 - 0.06 K/UL    DF AUTOMATED     METABOLIC PANEL, COMPREHENSIVE    Collection Time: 02/08/17  9:22 AM   Result Value Ref Range    Sodium 139 136 - 145 mmol/L    Potassium 3.4 (L) 3.5 - 5.5 mmol/L    Chloride 103 100 - 108 mmol/L    CO2 23 21 - 32 mmol/L    Anion gap 13 3.0 - 18 mmol/L    Glucose 126 (H) 74 - 99 mg/dL    BUN 22 (H) 7.0 - 18 MG/DL    Creatinine 1.22 0.6 - 1.3 MG/DL    BUN/Creatinine ratio 18 12 - 20      GFR est AA 52 (L) >60 ml/min/1.73m2    GFR est non-AA 43 (L) >60 ml/min/1.73m2    Calcium 8.9 8.5 - 10.1 MG/DL    Bilirubin, total 4.6 (H) 0.2 - 1.0 MG/DL    ALT (SGPT) 127 (H) 13 - 56 U/L    AST (SGOT) 119 (H) 15 - 37 U/L    Alk.  phosphatase 86 45 - 117 U/L    Protein, total 6.8 6.4 - 8.2 g/dL    Albumin 3.7 3.4 - 5.0 g/dL    Globulin 3.1 2.0 - 4.0 g/dL    A-G Ratio 1.2 0.8 - 1.7     LIPASE    Collection Time: 02/08/17  9:22 AM   Result Value Ref Range    Lipase 459 (H) 73 - 393 U/L   URINALYSIS W/ RFLX MICROSCOPIC    Collection Time: 02/08/17 10:38 AM   Result Value Ref Range    Color DARK YELLOW      Appearance CLEAR      Specific gravity >1.030 (H) 1.005 - 1.030    pH (UA) 7.5 5.0 - 8.0      Protein NEGATIVE  NEG mg/dL    Glucose NEGATIVE  NEG mg/dL    Ketone NEGATIVE  NEG mg/dL    Bilirubin SMALL (A) NEG      Blood NEGATIVE  NEG      Urobilinogen >8.0 (H) 0.2 - 1.0 EU/dL    Nitrites NEGATIVE  NEG      Leukocyte Esterase TRACE (A) NEG     URINE MICROSCOPIC ONLY    Collection Time: 02/08/17 10:38 AM   Result Value Ref Range    WBC 0 to 3 0 - 4 /hpf    RBC 0 0 - 5 /hpf    Epithelial cells 1+ 0 - 5 /lpf    Bacteria NEGATIVE  NEG /hpf       X-Ray, CT or other radiology findings or impressions:  CT ABD PELV W CONT   Final Result   IMPRESSION:     1. Small amount of pneumobilia and intraluminal gallbladder contrast in keeping  with recent prior ERCP procedure. No acute intra-abdominal or pelvic abnormality  identified. 2. Small hiatal hernia. 3. Diverticulosis without evidence of diverticulitis. Interpreted by radiologist 11:11 AM       Orders:  Orders Placed This Encounter    CT ABD PELV W CONT     Standing Status:   Standing     Number of Occurrences:   1     Order Specific Question:   Transport     Answer:   BED [2]     Order Specific Question:   Is Patient Allergic to Contrast Dye? Answer:   No     Order Specific Question:   Type of Contrast     Answer:   IV     Order Specific Question:   Does patient have history of Renal Disease?      Answer:   No     Order Specific Question:   Oral Contrast     Answer:   Per Radiologist Protocol    US ABD LTD     Standing Status:   Standing     Number of Occurrences:   1     Order Specific Question:   Transport     Answer:   Ambulatory [1]     Order Specific Question:   Specific Body Part     Answer:   RUQ    CBC WITH AUTOMATED DIFF     Standing Status:   Standing     Number of Occurrences:   1    METABOLIC PANEL, COMPREHENSIVE     Standing Status:   Standing     Number of Occurrences:   1    LIPASE     Standing Status:   Standing     Number of Occurrences:   1    URINALYSIS W/ RFLX MICROSCOPIC     Standing Status:   Standing     Number of Occurrences:   1    URINE MICROSCOPIC ONLY     Standing Status:   Standing     Number of Occurrences:   1    BILIRUBIN, FRACTIONATED     Standing Status:   Standing     Number of Occurrences:   1    DIET NPO     Standing Status:   Standing     Number of Occurrences:   1    FULL CODE     Standing Status:   Standing     Number of Occurrences:   1    IP CONSULT TO GASTROENTEROLOGY     Standing Status:   Standing     Number of Occurrences:   1     Order Specific Question: Reason for Consult: Answer:   abdominal pain     Order Specific Question:   Did you call or speak to the consulting provider? Answer:   No     Order Specific Question:   Consult To     Answer:   GI on call    sodium chloride 0.9 % bolus infusion 1,000 mL    ondansetron (ZOFRAN) injection 4 mg    iopamidol (ISOVUE 300) 61 % contrast injection 100 mL    morphine injection 4 mg    sodium chloride 0.9 % bolus infusion 1,000 mL    IP CONSULT TO HOSPITALIST     Standing Status:   Standing     Number of Occurrences:   1     Order Specific Question:   Reason for Consult: Answer:   ABDOMINAL PAIN     Order Specific Question:   Did you call or speak to the consulting provider? Answer:   No     Order Specific Question:   Consult To     Answer:   hospitalist    INITIAL PHYSICIAN ORDER: INPATIENT Medical; 3. Patient receiving treatment that can only be provided in an inpatient setting (further clarification in H&P documentation)     Standing Status:   Standing     Number of Occurrences:   1     Order Specific Question:   Status: Answer:   Inpatient [101]     Order Specific Question:   Type of Bed     Answer:   Medical [8]     Order Specific Question:   Inpatient Hospitalization Certified Necessary for the Following Reasons     Answer:   3. Patient receiving treatment that can only be provided in an inpatient setting (further clarification in H&P documentation)     Order Specific Question:   Admitting Diagnosis     Answer:   Choledocholithiasis [806797]     Order Specific Question:   Admitting Physician     Answer:   John Castellanos [[id number]]     Order Specific Question:   Attending Physician     Answer:   John Castellanos [[id number]]     Order Specific Question:   Estimated Length of Stay     Answer:   > or = to 2 Midnights     Order Specific Question:   Discharge Plan:     Answer:   Home with Office Follow-up       Progress notes, Consult notes, or additional Procedure notes:   3:50 PM Consult:  I discussed care with Dr. Osbaldo Kessler (gastroenterology). It was a standard discussion including patient history, chief complaint, available diagnostic results, and predicted treatment course. He recommends admission. 3:58 PM Consult: I discussed care with Tavon Broussard (PA for hospitalist). It was a standard discussion including patient history, chief complaint, available diagnostic results, and predicted treatment course. She will evaluate the patient for admission. Disposition:  Diagnosis:   1. Non-intractable vomiting with nausea, unspecified vomiting type    2. S/P ERCP    3. Hyperbilirubinemia    4. Elevated LFTs        Disposition: Admit    Follow-up Information     None           Patient's Medications   Start Taking    No medications on file   Continue Taking    AMLODIPINE (NORVASC) 10 MG TABLET    Take  by mouth daily. ESOMEPRAZOLE (NEXIUM) 40 MG CAPSULE    Take  by mouth daily. LOSARTAN (COZAAR) 100 MG TABLET    Take 100 mg by mouth daily. METRONIDAZOLE (METROGEL) 1 % TOPICAL GEL    Apply  to affected area daily. Use a thin layer to affected areas after washing    MULTIVITAMIN WITH IRON TABLET    Take 1 Tab by mouth daily. NYSTATIN-TRIAMCINOLONE (MYCOLOG II) TOPICAL CREAM    Apply  to affected area two (2) times a day. SOLIFENACIN (VESICARE) 10 MG TABLET    Take 1 Tab by mouth daily. ZOLPIDEM (AMBIEN) 5 MG TABLET    Take  by mouth nightly as needed for Sleep. These Medications have changed    No medications on file   Stop Taking    No medications on file         [id number] [facility] for and in the presence of Kat Yao MD (02/08/17)      Physician Attestation  I personally performed the services described in this documentation, reviewed and edited the documentation which was dictated to the scribe in my presence, and it accurately records my words and actions.     Kat Yao MD (02/08/17)      Signed by: Owen Grey, February 08, 2017 at 4:16 PM

## 2017-02-09 LAB
ALBUMIN SERPL BCP-MCNC: 2.9 G/DL (ref 3.4–5)
ALBUMIN/GLOB SERPL: 1.1 {RATIO} (ref 0.8–1.7)
ALP SERPL-CCNC: 82 U/L (ref 45–117)
ALT SERPL-CCNC: 125 U/L (ref 13–56)
ANION GAP BLD CALC-SCNC: 8 MMOL/L (ref 3–18)
AST SERPL W P-5'-P-CCNC: 89 U/L (ref 15–37)
BASOPHILS # BLD AUTO: 0 K/UL (ref 0–0.06)
BASOPHILS # BLD: 0 % (ref 0–2)
BILIRUB DIRECT SERPL-MCNC: 2.5 MG/DL (ref 0–0.2)
BILIRUB SERPL-MCNC: 3.3 MG/DL (ref 0.2–1)
BUN SERPL-MCNC: 17 MG/DL (ref 7–18)
BUN/CREAT SERPL: 16 (ref 12–20)
CALCIUM SERPL-MCNC: 8.2 MG/DL (ref 8.5–10.1)
CHLORIDE SERPL-SCNC: 108 MMOL/L (ref 100–108)
CO2 SERPL-SCNC: 25 MMOL/L (ref 21–32)
CREAT SERPL-MCNC: 1.06 MG/DL (ref 0.6–1.3)
DIFFERENTIAL METHOD BLD: ABNORMAL
EOSINOPHIL # BLD: 0.1 K/UL (ref 0–0.4)
EOSINOPHIL NFR BLD: 1 % (ref 0–5)
ERYTHROCYTE [DISTWIDTH] IN BLOOD BY AUTOMATED COUNT: 13.2 % (ref 11.6–14.5)
GLOBULIN SER CALC-MCNC: 2.6 G/DL (ref 2–4)
GLUCOSE SERPL-MCNC: 108 MG/DL (ref 74–99)
HCT VFR BLD AUTO: 36.3 % (ref 35–45)
HGB BLD-MCNC: 11.8 G/DL (ref 12–16)
LIPASE SERPL-CCNC: 160 U/L (ref 73–393)
LYMPHOCYTES # BLD AUTO: 7 % (ref 21–52)
LYMPHOCYTES # BLD: 1.4 K/UL (ref 0.9–3.6)
MCH RBC QN AUTO: 32.2 PG (ref 24–34)
MCHC RBC AUTO-ENTMCNC: 32.5 G/DL (ref 31–37)
MCV RBC AUTO: 98.9 FL (ref 74–97)
MONOCYTES # BLD: 1.9 K/UL (ref 0.05–1.2)
MONOCYTES NFR BLD AUTO: 9 % (ref 3–10)
NEUTS SEG # BLD: 16.8 K/UL (ref 1.8–8)
NEUTS SEG NFR BLD AUTO: 83 % (ref 40–73)
PLATELET # BLD AUTO: 286 K/UL (ref 135–420)
PMV BLD AUTO: 9.9 FL (ref 9.2–11.8)
POTASSIUM SERPL-SCNC: 4.2 MMOL/L (ref 3.5–5.5)
PROT SERPL-MCNC: 5.5 G/DL (ref 6.4–8.2)
RBC # BLD AUTO: 3.67 M/UL (ref 4.2–5.3)
SODIUM SERPL-SCNC: 141 MMOL/L (ref 136–145)
WBC # BLD AUTO: 20.2 K/UL (ref 4.6–13.2)

## 2017-02-09 PROCEDURE — 85025 COMPLETE CBC W/AUTO DIFF WBC: CPT | Performed by: PHYSICIAN ASSISTANT

## 2017-02-09 PROCEDURE — 74011250637 HC RX REV CODE- 250/637: Performed by: PHYSICIAN ASSISTANT

## 2017-02-09 PROCEDURE — 74011250636 HC RX REV CODE- 250/636: Performed by: PHYSICIAN ASSISTANT

## 2017-02-09 PROCEDURE — 36415 COLL VENOUS BLD VENIPUNCTURE: CPT | Performed by: PHYSICIAN ASSISTANT

## 2017-02-09 PROCEDURE — 65270000029 HC RM PRIVATE

## 2017-02-09 PROCEDURE — 80048 BASIC METABOLIC PNL TOTAL CA: CPT | Performed by: PHYSICIAN ASSISTANT

## 2017-02-09 PROCEDURE — 83690 ASSAY OF LIPASE: CPT | Performed by: PHYSICIAN ASSISTANT

## 2017-02-09 PROCEDURE — 80076 HEPATIC FUNCTION PANEL: CPT | Performed by: PHYSICIAN ASSISTANT

## 2017-02-09 RX ADMIN — DEXTROSE MONOHYDRATE, SODIUM CHLORIDE, AND POTASSIUM CHLORIDE 125 ML/HR: 50; 4.5; 1.49 INJECTION, SOLUTION INTRAVENOUS at 19:38

## 2017-02-09 RX ADMIN — DEXTROSE MONOHYDRATE, SODIUM CHLORIDE, AND POTASSIUM CHLORIDE 125 ML/HR: 50; 4.5; 1.49 INJECTION, SOLUTION INTRAVENOUS at 10:52

## 2017-02-09 RX ADMIN — AMLODIPINE BESYLATE 10 MG: 10 TABLET ORAL at 08:50

## 2017-02-09 RX ADMIN — PANTOPRAZOLE SODIUM 40 MG: 40 TABLET, DELAYED RELEASE ORAL at 08:50

## 2017-02-09 RX ADMIN — LOSARTAN POTASSIUM 100 MG: 50 TABLET ORAL at 08:50

## 2017-02-09 RX ADMIN — DEXTROSE MONOHYDRATE, SODIUM CHLORIDE, AND POTASSIUM CHLORIDE 125 ML/HR: 50; 4.5; 1.49 INJECTION, SOLUTION INTRAVENOUS at 02:00

## 2017-02-09 NOTE — PROGRESS NOTES
[facility]   Discharge Planning/ Assessment    Reasons for Intervention: Chart reviewed and demographics verified with pt. She lives in a house with daughter Delores Nugent and grand daughter Verona Paniagua. Patients  phone # [phone]. She has [place] [employer] and [employer]. Her PCP is Dr Deshaun Grant. She is independent with ADL and has no HH/ DME. Plan is to go home when medically stable. Will continue to follow for care. Nevaeh Glez RN    High Risk Criteria  [] Yes  [x]No   Physician Referral  [] Yes  [x]No        Date    Nursing Referral  [] Yes  [x]No        Date    Patient/Family Request  [] Yes  [x]No        Date       Resources:    Medicare  [x] Yes  []No   Medicaid  [] Yes  [x]No   No Resources  [] Yes  [x]No   Private Insurance  [x] Yes  []No    Name/Phone Number    Other  [] Yes  [x]No        (i.e. Workman's Comp)         Prior Services:    Prior Services  [] Yes  [x]No   Home Health  [] Yes  [x]No   [facility]  [] Yes  [x]No        Number of Hours    Home Care Program  [] Yes  [x]No       Meals on Wheels  [] Yes  [x]No   Office on Aging  [] Yes  [x]No   Transportation Services  [] Yes  [x]No   Nursing Home  [] Yes  [x]No        Nursing Home Name    [address]  [] Yes  [x]No        [address] Name    Other       Information Source:      Information obtained from  [x] Patient  [] Parent   [] [address]  [] Child  [] Spouse   [] Significant Other/Partner   [] Friend      [] EMS    [] Nursing Home Chart          [] Other:   Chart Review  [x] Yes  []No     Family/Support System:    Patient lives with  [] Alone    [] Spouse   [] Significant Other  [x] Children  [] Caretaker   [] Parent  [] Sibling     [] Other       Other Support System:    Is the patient responsible for care of others  [] Yes  [x]No   Information of person caring for patient on  discharge    Managers financial affairs independently  [x] Yes  []No   If no, explain:      Status Prior to Admission:    Mental Status  [x] Awake  [x] Alert  [x] Oriented  [] Quiet/Calm [] Lethargic/Sedated   [] Disoriented  [] Restless/Anxious  [] Combative   Personal Care  [] Dependent  [x] Independent Personal Care  [] Requires Assistance   Meal Preparation Ability  [x] Independent   [] Standby Assistance   [] Minimal Assistance   [] Moderate Assistance  [] Maximum Assistance     [] Total Assistance   Chores  [x] Independent with Chores   [] N/A Nursing Home Resident   [] Requires Assistance   Bowel/Bladder  [x] Continent  [] Catheter  [] Incontinent  [] Ostomy Self-Care    [] Urine Diversion Self-Care  [] Maximum Assistance     [] Total Assistance   Number of Persons needed for assistance    DME at home  [] Nereyda Boswell, Miguelangela Her  [] Nereyda Hivbrando, Straight   [] Commode    [] Bathroom/Grab Bars  [] Hospital Bed  [] Nebulizer  [] Oxygen           [] Raised Toilet Seat  [] Shower Chair  [] Side Rails for Bed   [] Tub Transfer Bench   [] Cervantes Kandi  [] Margarette Weiss      [] Other:   Vendor      Treatment Presently Receiving:    Current Treatments  [] Chemotherapy  [] Dialysis  [] Insulin  [] IVAB [x] IVF   [] O2  [] PCA   [] PT   [] RT   [] Tube Feedings   [] Wound Care     Psychosocial Evaluation:    Verbalized Knowledge of Disease Process  [] Patient  []Family   Coping with Disease Process  [] Patient  []Family   Requires Further Counseling Coping with Disease Process  [] Patient  []Family     Identified Projected Needs:    Home Health Aid  [] Yes  [x]No   Transportation  [] Yes  [x]No   Education  [] Yes  [x]No        Specific Education     Financial Counseling  [] Yes  [x]No   Inability to Care for Self/Will Require 24 hour care  [] Yes  [x]No   Pain Management  [] Yes  [x]No   Home Infusion Therapy  [] Yes  [x]No   Oxygen Therapy  [] Yes  [x]No   DME  [] Yes  [x]No   Long Term Care Placement  [] Yes  [x]No   Rehab  [] Yes  [x]No   Physical Therapy  [] Yes  [x]No   Needs Anticipated At This Time  [] Yes  [x]No     Intra-Hospital Referral:    [facility]  [] Yes  [x]No     [] Yes  [x]No   Patient Representative  [] Yes  [x]No   Staff for Teaching Needs  [] Yes  [x]No   Specialty Teaching Needs     Diabetic Educator  [] Yes  [x]No   Referral for Diabetic Educator Needed  [] Yes  [x]No  If Yes, place order for Nutritionist or Diabetic Consult     Tentative Discharge Plan:    Home with No Services  [x] Yes  []No   Home with [address]  [] Yes  [x]No        If Yes, specify type    Home Care Program  [] Yes  [x]No        If Yes, specify type    Meals on Wheels  [] Yes  [x]No   Office of Aging  [] Yes  [x]No   NHP  [] Yes  [x]No   Return to the Nursing Home  [] Yes  [x]No   Rehab Therapy  [] Yes  [x]No   Acute Rehab  [] Yes  [x]No   Subacute Rehab  [] Yes  [x]No   Private Care  [] Yes  [x]No   Substance Abuse Referral  [] Yes  [x]No   Transportation  [] Yes  [x]No   Chore Service  [] Yes  [x]No   Inpatient Hospice  [] Yes  [x]No   OP RT  [] Yes  [x] No   OP Hemo  [] Yes  [x] No   OP PT  [] Yes  [x]No   Support Group  [] Yes  [x]No   Reach to Recovery  [] Yes  [x]No   OP Oncology Clinic  [] Yes  [x]No   Clinic Appointment  [] Yes  [x]No   DME  [] Yes  [x]No   Comments    Name of D/C Planner or  Given to Patient or Family Adryan Patel. Jocelynn Desir RN   Phone Number [phone]        Extension    Date 02/09/2017   Time    If you are discharged home, whom do you designate to participate in your discharge plan and receive any information needed?      Enter name of [address] [place] [address]        Phone # of designee [phone]        Address of designee Same as pt        Updated         Patient refused to designate any           individual

## 2017-02-09 NOTE — ROUTINE PROCESS
The patient is nauseous. Family at the bedside. Admission database reviewed and completed with the patient. The patient has no record of taking losartan. I called [facility] and they have no record of it. Patient also uses mail order pharmacy. Notified nurse that patient is unsure if she takes losartan.

## 2017-02-09 NOTE — DISCHARGE SUMMARY
Internal Medicine Discharge Summary        Patient: Mounika Suárez    YOB: 1940    Age:  68 y.o. Admit Date: 2/8/2017    Discharge Date: 2/10/2017    LOS:  LOS: 2 days     Discharge To: Home    Consults: Gastroenterology    Admission Diagnoses: Choledocholithiasis    Discharge Diagnoses:    Problem List as of 2/10/2017  Date Reviewed: 2/7/2017          Codes Class Noted - Resolved    * (Principal)Choledocholithiasis ICD-10-CM: K80.50  ICD-9-CM: 574.50  2/8/2017 - Present        Persistent vomiting ICD-10-CM: R11.10  ICD-9-CM: 536.2  2/8/2017 - Present        Elevated LFTs ICD-10-CM: R79.89  ICD-9-CM: 790.6  2/8/2017 - Present        Leukocytosis ICD-10-CM: D72.829  ICD-9-CM: 288.60  2/8/2017 - Present        Incontinence (Chronic) ICD-10-CM: R32  ICD-9-CM: 788.30  Unknown - Present        Hypertension (Chronic) ICD-10-CM: I10  ICD-9-CM: 401.9  Unknown - Present        Hyperlipidemia (Chronic) ICD-10-CM: E78.5  ICD-9-CM: 272.4  Unknown - Present        GERD (gastroesophageal reflux disease) (Chronic) ICD-10-CM: K21.9  ICD-9-CM: 530.81  Unknown - Present        PATRICIA (generalized anxiety disorder) (Chronic) ICD-10-CM: F41.1  ICD-9-CM: 300.02  Unknown - Present        CKD (chronic kidney disease), stage III (Chronic) ICD-10-CM: N18.3  ICD-9-CM: 690. 3  Unknown - Present        History of gastrointestinal hemorrhage (Chronic) ICD-10-CM: Z87.19  ICD-9-CM: V12.79  5/12/2010 - Present    Overview Signed 6/9/2016 11:49 AM by Fernando Diego     Overview:   History of hemorragic gastritis diagnosed by EGD 1973             RESOLVED: Urinary incontinence ICD-10-CM: R32  ICD-9-CM: 788.30  8/11/2016 - 2/8/2017        RESOLVED: OAB (overactive bladder) ICD-10-CM: N32.81  ICD-9-CM: 596.51  Unknown - 2/8/2017        RESOLVED: Pruritus ICD-10-CM: L29.9  ICD-9-CM: 698.9  Unknown - 2/8/2017        RESOLVED: Hordeolum ICD-10-CM: N84.852  ICD-9-CM: 373.11  Unknown - 2/8/2017        RESOLVED: Gastritis with bleeding ICD-10-CM: K29.71  ICD-9-CM: 535.51  Unknown - 2/8/2017        RESOLVED: Gastritis ICD-10-CM: K29.70  ICD-9-CM: 535.50  Unknown - 2/8/2017        RESOLVED: Dyspnea ICD-10-CM: R06.00  ICD-9-CM: 786.09  Unknown - 2/8/2017        RESOLVED: Dyslipidemia ICD-10-CM: E78.5  ICD-9-CM: 272.4  Unknown - 2/8/2017        RESOLVED: Chest pain ICD-10-CM: R07.9  ICD-9-CM: 786.50  Unknown - 2/8/2017        RESOLVED: Cervicalgia ICD-10-CM: M54.2  ICD-9-CM: 723.1  Unknown - 2/8/2017        RESOLVED: Cervical spondylosis with radiculopathy ICD-10-CM: M47.22  ICD-9-CM: 721.0  Unknown - 2/8/2017        RESOLVED: Acute neck sprain ICD-10-CM: S13. 9XXA  ICD-9-CM: 847.0  Unknown - 2/8/2017        RESOLVED: Abnormal LFTs ICD-10-CM: R79.89  ICD-9-CM: 790.6  Unknown - 2/8/2017        RESOLVED: Rectal hemorrhage ICD-10-CM: K62.5  ICD-9-CM: 569.3  3/21/2015 - 2/8/2017        RESOLVED: Gastrointestinal hemorrhage ICD-10-CM: K92.2  ICD-9-CM: 578.9  3/21/2015 - 2/8/2017        RESOLVED: Osteoarthritis ICD-10-CM: M19.90  ICD-9-CM: 715.90  3/21/2015 - 2/8/2017        RESOLVED: Hematochezia ICD-10-CM: K92.1  ICD-9-CM: 578.1  5/12/2010 - 2/8/2017        RESOLVED: Bursitis of shoulder ICD-10-CM: M75.50  ICD-9-CM: 726.10  5/12/2010 - 2/8/2017        RESOLVED: Gastroesophageal reflux disease ICD-10-CM: K21.9  ICD-9-CM: 530.81  5/12/2010 - 2/8/2017        RESOLVED: Weight loss ICD-10-CM: R63.4  ICD-9-CM: 783.21  5/12/2010 - 2/8/2017              Discharge Condition:  Improved    Procedures:         HPI: Sarah Vicente is a 68 y.o. female with a hx of HTN, GERD, CKD, Hyperlipidemia and insomnia who was admitted to [facility] on 2/8/17 for abdominal pain and vomiting post-procedure. She underwent an ERCP yesterday with Dr. Candida Valentin and was discharged to home after her procedure. She notes last night she developed abdominal pain and vomiting that continued in to this morning.  She notes the pain was initially in her RUQ last night but this morning she \"felt it all over her whole body. \" she denies any fever, chills, diarrhea, dysuria, chest pain or shortness of breath. Today she has elevated LFT's with a total bilirubin of 4.6, ALT of 127, and . Her lipase is also elevated at 459 along with a leukocytosis of 14K. A Ct of her abdomen was consistent with changes related to her procedure yesterday without any acute process. GI has been consulted and she will be admitted for further evaluation and treatment. Hospital Course (Including Significant Findings): The patient was admitted to the hospital for further management of her acute pancreatitis s/p ERCP. She no longer had any pain the following morning after admission. Her diet was advanced to clear liquids in the AM and she tolerated this well. Her LFTs and TBili were decreasing although she had a bump in her WBC's which was likely 2/2 post-procedure from stress because it completely went back to WNL by the day of discharge and she remained afebrile. She was seen on the morning of discharge and offered no complaints other than being hungry. She had mild HARI on admission that resolved with fluids by discharge. She was followed by GI during her admission as well and they agreed with the plan. She was advised to follow up with her PCP and GI. The rest of the patient's chronic conditions were managed appropriately during their admission. They were medically stable at the time of discharge.     Physical Exam at Discharge:  General Appearance: NAD, conversant  HENT: normocephalic/atraumatic, moist mucus membranes  Lungs: CTA with normal respiratory effort  CV: RRR, no m/r/g  Abdomen: soft, non-tender, normal bowel sounds  Extremities: no cyanosis, no peripheral edema  Neuro: moves all extremities, no focal deficits  Psych: appropriate affect, alert and oriented to person, place and time    Labs Prior to Discharge:  Labs: Results:       Chemistry Recent Labs      02/10/17   0350  02/09/17   0404 02/08/17   0922   GLU  108*  108*  126*   NA  141  141  139   K  4.2  4.2  3.4*   CL  108  108  103   CO2  25  25  23   BUN  10  17  22*   CREA  0.94  1.06  1.22   CA  8.8  8.2*  8.9   AGAP  8  8  13   BUCR  11*  16  18   AP  117  82  86   TP  6.1*  5.5*  6.8   ALB  3.0*  2.9*  3.7   GLOB  3.1  2.6  3.1   AGRAT  1.0  1.1  1.2      CBC w/Diff Recent Labs      02/10/17   0350  02/09/17   0401  02/08/17 0922   WBC  13.1  20.2*  14.2*   RBC  3.98*  3.67*  4.34   HGB  12.9  11.8*  14.5   HCT  39.3  36.3  41.9   PLT  312  286  261   GRANS  77*  83*  95*   LYMPH  10*  7*  4*   EOS  1  1  0      Cardiac Enzymes No results for input(s): CPK, CKND1, DEZ in the last 72 hours. No lab exists for component: CKRMB, TROIP   Coagulation No results for input(s): PTP, INR, APTT in the last 72 hours. No lab exists for component: INREXT    Lipid Panel No results found for: CHOL, CHOLPOCT, CHOLX, CHLST, CHOLV, [id number], HDL, LDL, NLDLCT, DLDL, LDLC, DLDLP, 225652, VLDLC, VLDL, TGL, TGLX, TRIGL, [id number], TRIGP, TGLPOCT, [id number], CHHD, CHHDX   BNP No results for input(s): BNPP in the last 72 hours. Liver Enzymes Recent Labs      02/09/17   0401   TP  5.5*   ALB  2.9*   AP  82   SGOT  89*      Thyroid Studies No results found for: T4, T3U, TSH, TSHEXT         Significant Imaging:  Ct Abd Pelv W Cont    Result Date: 2/8/2017  EXAM: CT of the abdomen and pelvis INDICATION: Abdominal pain, recent prior ERCP procedure. COMPARISON: MRCP 1/5/2017 TECHNIQUE: Axial CT imaging of the abdomen and pelvis was performed with intravenous contrast. Multiplanar reformats were generated. One or more dose reduction techniques were used on this CT: automated exposure control, adjustment of the mAs and/or kVp according to patient's size, and iterative reconstruction techniques.  The specific techniques utilized on this CT exam have been documented in the patient's electronic medical record. _______________ FINDINGS: LOWER CHEST: Images through the lung bases demonstrate scattered areas of dependent atelectasis, without focal pneumonic opacity, pneumothorax or pleural effusion. Normal cardiac size. No pericardial effusion. Coronary arterial atherosclerotic calcifications are present. LIVER, BILIARY: Hepatic parenchymal enhancement is uniform. There is a small amount of pneumobilia present. No intrahepatic biliary ductal dilatation. Increased attenuation material is noted within the gallbladder lumen, in keeping with recent prior ERCP procedure. Segment 8 hepatic cyst again noted. PANCREAS: Normal. SPLEEN: Normal. ADRENALS: Normal. KIDNEYS/URETERS/BLADDER: Renal parenchymal enhancement is symmetric. No hydronephrosis. Lower pole left renal cyst present. PELVIC ORGANS: Unremarkable. VASCULATURE: There is atherosclerotic calcification of the infrarenal abdominal aorta and bilateral iliac vessels without evidence of aneurysmal dilatation. LYMPH NODES: Scattered subcentimeter mesenteric and retroperitoneal lymph nodes noted. No abdominal or pelvic adenopathy. GASTROINTESTINAL TRACT: There is a small hiatal hernia. Small intestine and large intestine are normal in course and caliber. No bowel obstruction. No free intraperitoneal gas. Diverticulosis of the descending and sigmoid colon present without evidence of diverticulitis. BONES: No acute or aggressive osseous abnormalities identified. Mild lower lumbar spondylosis and facet osteoarthrosis noted. OTHER: None. _______________     IMPRESSION: 1. Small amount of pneumobilia and intraluminal gallbladder contrast in keeping with recent prior ERCP procedure. No acute intra-abdominal or pelvic abnormality identified. 2. Small hiatal hernia. 3. Diverticulosis without evidence of diverticulitis. [address]    Result Date: 2/8/2017  History: Epigastric pain, leukocytosis, ERCP with sphincterotomy and stone extraction due to choledocholithiasis yesterday. Correlation CT today and prior ultrasound 12/7/2016.  EXAM: Right upper quadrant ultrasound _______________ FINDINGS: LIVER: Mild diffuse increased hepatic echogenicity. 2 cm anechoic cyst right hepatic lobe stable. . No new hepatic mass. Normal direction of blood flow in the portal vein. BILIARY SYSTEM: No intrahepatic ductal dilatation with areas of intraductal echogenicity compatible with documented pneumobilia from recent ERCP. There is also 4 mm diameter echogenic focus within the common bile duct. . Common bile duct is borderline dilated for age measuring 0.9 cm. GALLBLADDER: There is distended gallbladder with borderline diffuse wall thickening 3 mm with very small pericholecystic fluid. There is some layering sludge with no definite gallstone. Negative sonographic Garvey's sign although patient received pain medication recently in the emergency room. Rubio Fischer RIGHT KIDNEY: 10.8 cm in length. No hydronephrosis or renal mass. No visible calculi. PANCREAS: Head and body are unremarkable in appearance though the tail is obscured by overlying bowel gas. IVC: Visualized portions are unremarkable in appearance. OTHER: No free intraperitoneal fluid. _______________     IMPRESSION: 1. FINDINGS suggesting pneumobilia compatible with recent ERCP. 4 mm echogenic focus common bile duct, cannot exclude small residual common bile duct stone although findings could also be related to pneumobilia. 2. Dilated gallbladder, mild wall thickening and small pericholecystic fluid, with sludge. Possibility of acute cholecystitis should be considered, further evaluation with nuclear medicine HIDA scan suggested. 3. Mild diffuse hepatic echogenicity probable mild hepatic steatosis. Small hepatic cyst.     [facility] Xr Technologist Service    Result Date: 2/8/2017  Fluoroscopy was used during ERCP under the supervision of the attending surgeon. Start Time:     1345 hours End Time:      1455 hours Fluoro Time:  0 hr   0 min   280.0 sec # of Images:  8       IMPRESSION:   Administrative report.   [facility] Medications:     Current Discharge Medication List      CONTINUE these medications which have NOT CHANGED    Details   benzonatate (TESSALON) 200 mg capsule Take 200 mg by mouth three (3) times daily as needed for Cough. amoxicillin (AMOXIL) 875 mg tablet Take 875 mg by mouth two (2) times a day. DULCOLAX, BISACODYL, PO Take  by mouth as needed. solifenacin (VESICARE) 10 mg tablet Take 1 Tab by mouth daily. Qty: 90 Tab, Refills: 3      metroNIDAZOLE (METROGEL) 1 % topical gel Apply  to affected area daily. Use a thin layer to affected areas after washing      multivitamin with iron tablet Take 1 Tab by mouth daily. zolpidem (AMBIEN) 5 mg tablet Take 5 mg by mouth nightly. esomeprazole (NEXIUM) 40 mg capsule Take 40 mg by mouth two (2) times a day. losartan (COZAAR) 100 mg tablet Take 100 mg by mouth daily. amLODIPine (NORVASC) 10 mg tablet Take  by mouth daily. nystatin-triamcinolone (MYCOLOG II) topical cream Apply  to affected area two (2) times a day. Activity: Activity as tolerated    Diet: Clear liquids, advance as tolerated    Wound Care: None needed    Follow-up:   Please follow up with your PCP within 7 days to discuss your recent hospitalization. Patient to arrange. Please follow up with Dr. Yoli Lopez, GI, as directed.          Total time spent including time spent on final examination and discharge discussion, discharge documentation and records reviewed and medication reconciliation: > 30 minutes    Sherie Briceno DO  Internal Medicine, Hospitalist  Pager: [phone]

## 2017-02-09 NOTE — PROGRESS NOTES
1927 -- Bedside and Verbal shift change report given to Emma Reynoso RN (oncoming nurse) by Aysha Finnegan RN (offgoing nurse). Pt approved report to be given with daughter Pro Celis at bedside. Allergy band applied to wrist.  Report included the following information SBAR, Procedure Summary, Intake/Output, MAR, Recent Results. Bed locked and lowered, siderails up x3. Whiteboard updated with information. Call bell at bedside. Pt states she is very nauseous, medication was administered, will continue to monitor.      2059 -- Bedtime and PRN pain medication administered, pt tolerated with ease.      7595 -- Bedside and Verbal shift change report given to Vineet Wheat RN (oncoming nurse) by Prasanth Sims RN (offgoing nurse) Report included the following information SBAR, Procedure Summary, Intake/Output, MAR, Recent Results. Pt up in bed, no indication of pain or acute distress. Bed locked and lowered, siderails up x3. Call bell at bedside.

## 2017-02-09 NOTE — CONSULTS
Consult Note    Patient: Daryn Borrero               Sex: female          DOA: 2/8/2017         YOB: 1940      Age:  68 y.o.        LOS:  LOS: 1 day              HPI:     Daryn Borrero is a 68 y.o. female with a hx of biliary colics, and an MRCP that showed CBD stones. She underwent on ERCP with Dr Michael Silver on 2/7/17. She had a biliary sphincterotomy and a balloon dilation of the sphincter in order to allow complete stone extraction and clearance of the bile duct. Shortly after she went home, she developed a severe abdominal pain. This was constant, and sharp. It involved the epigastric area, but radiated across the upper abdomen and to the back. There was associated vomiting. This continued through the night and into yesterday when she finally came to the ER. Labs showed an elevation in lipase level to 459. A CT of the abdomen and pelvis was unremarkable. There was some pneumobilia. There was no retroperitoneal air. The pancreas looked normal. The patient was admitted for further management. This morning the patient felt 100% better with no significant pain, or nausea. She tolerated a clear liquid diet.      Past Medical History   Diagnosis Date    Abnormal LFTs     Acute neck sprain     Bursitis      right shoulder    Cervicalgia     Chest pain     CKD (chronic kidney disease), stage III     Dyslipidemia     Dyspnea     PATRICIA (generalized anxiety disorder)     Gallstones     Gastritis     Gastritis with bleeding     GERD (gastroesophageal reflux disease)     Hordeolum     Hyperlipidemia     Hypertension     Incontinence     Insomnia     OAB (overactive bladder)     Pruritus        Past Surgical History   Procedure Laterality Date    Hx appendectomy      Hx hysterectomy      Pr appendectomy      Hx endoscopy      Hx colonoscopy         Family History   Problem Relation Age of Onset    No Known Problems Brother        Social History     Social History    Marital status:      Spouse name: N/A    Number of children: N/A    Years of education: N/A     Social History Main Topics    Smoking status: Former Smoker     Quit date: 1/1/1980    Smokeless tobacco: Never Used    Alcohol use Yes      Comment: occassionally    Drug use: No    Sexual activity: Not Asked     Other Topics Concern    None     Social History Narrative       Prior to Admission medications    Medication Sig Start Date End Date Taking? Authorizing Provider   benzonatate (TESSALON) 200 mg capsule Take 200 mg by mouth three (3) times daily as needed for Cough. Yes Historical Provider   amoxicillin (AMOXIL) 875 mg tablet Take 875 mg by mouth two (2) times a day. Kaycee Trujillo MD   DULCOLAX, BISACODYL, PO Take  by mouth as needed. Historical Provider   solifenacin (VESICARE) 10 mg tablet Take 1 Tab by mouth daily. 8/11/16   Dominique Marcum MD   metroNIDAZOLE (METROGEL) 1 % topical gel Apply  to affected area daily. Use a thin layer to affected areas after washing    Historical Provider   multivitamin with iron tablet Take 1 Tab by mouth daily. Historical Provider   zolpidem (AMBIEN) 5 mg tablet Take 5 mg by mouth nightly. Historical Provider   esomeprazole (NEXIUM) 40 mg capsule Take 40 mg by mouth two (2) times a day. Historical Provider   losartan (COZAAR) 100 mg tablet Take 100 mg by mouth daily. Historical Provider   amLODIPine (NORVASC) 10 mg tablet Take  by mouth daily. Historical Provider   nystatin-triamcinolone (MYCOLOG II) topical cream Apply  to affected area two (2) times a day. Historical Provider       Allergies   Allergen Reactions    Codeine Other (comments)     Chills, Loss of consciousness     Norco [Hydrocodone-Acetaminophen] Nausea Only    Vistaril [Hydroxyzine Pamoate] Vertigo       Review of Systems  A comprehensive review of systems was negative except for that written in the History of Present Illness.       Physical Exam:      Visit Vitals    BP 160/79    Pulse 80    Temp 97.4 °F (36.3 °C)    Resp 22    Wt 63.9 kg (140 lb 14.4 oz)    SpO2 94%    BMI 25.77 kg/m2       Physical Exam:  Constitutional: negative  Eyes: negative  Ears, nose, mouth, throat, and face: negative  Respiratory: negative  Cardiovascular: negative  Gastrointestinal: negative  Genitourinary:negative  Integument/breast: negative  Hematologic/lymphatic: negative  Musculoskeletal:negative  Neurological: negative    Labs Reviewed:  CMP:   Lab Results   Component Value Date/Time     02/09/2017 04:01 AM    K 4.2 02/09/2017 04:01 AM     02/09/2017 04:01 AM    CO2 25 02/09/2017 04:01 AM    AGAP 8 02/09/2017 04:01 AM     (H) 02/09/2017 04:01 AM    BUN 17 02/09/2017 04:01 AM    CREA 1.06 02/09/2017 04:01 AM    GFRAA >60 02/09/2017 04:01 AM    GFRNA 50 (L) 02/09/2017 04:01 AM    CA 8.2 (L) 02/09/2017 04:01 AM    ALB 2.9 (L) 02/09/2017 04:01 AM    TP 5.5 (L) 02/09/2017 04:01 AM    GLOB 2.6 02/09/2017 04:01 AM    AGRAT 1.1 02/09/2017 04:01 AM    SGOT 89 (H) 02/09/2017 04:01 AM     (H) 02/09/2017 04:01 AM     CBC:   Lab Results   Component Value Date/Time    WBC 20.2 (H) 02/09/2017 04:01 AM    HGB 11.8 (L) 02/09/2017 04:01 AM    HCT 36.3 02/09/2017 04:01 AM     02/09/2017 04:01 AM     Liver Panel:   Lab Results   Component Value Date/Time    ALB 2.9 (L) 02/09/2017 04:01 AM    CBIL 2.5 (H) 02/09/2017 04:01 AM    TP 5.5 (L) 02/09/2017 04:01 AM    GLOB 2.6 02/09/2017 04:01 AM    AGRAT 1.1 02/09/2017 04:01 AM    SGOT 89 (H) 02/09/2017 04:01 AM     (H) 02/09/2017 04:01 AM    AP 82 02/09/2017 04:01 AM     Pancreatic Markers:   Lab Results   Component Value Date/Time    LPSE 160 02/09/2017 04:01 AM       Imaging:  CT scan reviewed.        Assessment/Plan     Principal Problem:    Choledocholithiasis (2/8/2017)    Active Problems:    History of gastrointestinal hemorrhage (5/12/2010)      Overview: Overview:       History of hemorragic gastritis diagnosed by EGD 1973      Incontinence ()      Hypertension ()      Hyperlipidemia ()      GERD (gastroesophageal reflux disease) ()      PATRICIA (generalized anxiety disorder) ()      CKD (chronic kidney disease), stage III ()      Persistent vomiting (2/8/2017)      Elevated LFTs (2/8/2017)      Leukocytosis (2/8/2017)      This is a 67 yo woman who underwent an ERCP with CBD stone extraction on 5/3/29 complicated by a mild post-ERCP pancreatitis. She is improved clinically. Continue IV fluids. Advance diet as tolerated (keep on a low fat diet). Repeat labs in am.   If continues to do well can go home tomorrow.      Homero Quan MD.

## 2017-02-09 NOTE — PROGRESS NOTES
conducted an initial consultation and Spiritual Assessment for Akiko Ceballos, who is a 68 y.o.,female. Patients Primary Language is: Indonesian. According to the patients EMR Anglican Affiliation is: Buddhist. The reason the Patient came to the hospital is:   Patient Active Problem List    Diagnosis Date Noted    Choledocholithiasis 02/08/2017    Persistent vomiting 02/08/2017    Elevated LFTs 02/08/2017    Leukocytosis 02/08/2017    Incontinence     Hypertension     Hyperlipidemia     GERD (gastroesophageal reflux disease)     PATRICIA (generalized anxiety disorder)     CKD (chronic kidney disease), stage III     History of gastrointestinal hemorrhage 05/12/2010        The  provided the following Interventions:  Initiated a relationship of care and support. Explored issues of zacarias, belief, spirituality and Sikh/ritual needs while hospitalized. Listened empathically. Provided chaplaincy education. Provided information about Spiritual Care Services. Offered prayer and assurance of continued prayers on patient's behalf. Chart reviewed. The following outcomes were achieved:  Patient shared limited information about both their medical narrative and spiritual journey/beliefs. Patient processed feeling about current hospitalization. Patient expressed gratitude for the 's visit. Assessment:  Patient does not have any Sikh/cultural needs that will affect patients preferences in health care. Patient did not indicate any spiritual or Sikh issues which require Spiritual Care Services interventions at this time. Plan:  Chaplains will continue to follow and will provide pastoral care on an as needed/requested basis.  recommends bedside caregivers page  on duty if patient shows signs of acute spiritual or emotional distress.     [facility]   Staff [facility]   [phone]

## 2017-02-09 NOTE — PROGRESS NOTES
0800 - assumed care of patient - no complaints of pain - no further nausea/vomiting. Ambulatory to bathroom with assist - daughter at bedside. 0845 - AM meds given.  Patient advanced to clear liquids    1130 - rounds at bedside - awaiting GI for further orders

## 2017-02-09 NOTE — CERTIFICATE OF TERMINAL ILLNESS
Verbal and bedside Shift changed report given to Fernando Dent RN (oncoming RN) on Pt. Condition. Report consisted of patients Situation, History, Activities, intake/output,  Background, Assessment and Recommendations(SBAR). Information from the following report(s) Kardex, order Summary, Lab results and MAR was reviewed with the receiving nurse. Opportunity for questions and clarification was provided.

## 2017-02-09 NOTE — ROUTINE PROCESS
2120 Bedside and Verbal shift change  Received from CHUCK Raya RN (outgoing nurse), to VANCE Osei (oncoming)  Pt. Is AOX 4. IV SL, Pt. denies  pain at this time. Report included the following information SBAR, Kardex, Procedure Summary, Intake/Output, MAR, Recent Lab Results. Will resume care and monitor Pt. Condition. Pt. Educated on call bell when in need of help and assistance. Pt. verbalized understanding. Pt. Head to toe Assessment Done and documented. 2310  OOB to bathroom x1 standby assist.  Anne-Marie clear urine. 46  OOB with assist to bathroom. 0330  Pt. Resting in bed with eyes closed, easily awaken. 0530  Pt. Able to rest well throughout the shift. No complaints made.

## 2017-02-09 NOTE — ACP (ADVANCE CARE PLANNING)
Patient has designated _____Daughter___________________ to participate in his/her discharge plan and to receive any needed information.      Name: Juan Diego Malloy  Address: [address] [place] [address],[place] [address], [address] [place] [address]  Phoner:m prpv [phone]

## 2017-02-10 VITALS
HEART RATE: 68 BPM | DIASTOLIC BLOOD PRESSURE: 78 MMHG | TEMPERATURE: 98.3 F | OXYGEN SATURATION: 92 % | SYSTOLIC BLOOD PRESSURE: 150 MMHG | BODY MASS INDEX: 24.76 KG/M2 | WEIGHT: 135.4 LBS | RESPIRATION RATE: 18 BRPM

## 2017-02-10 LAB
ALBUMIN SERPL BCP-MCNC: 3 G/DL (ref 3.4–5)
ALBUMIN/GLOB SERPL: 1 {RATIO} (ref 0.8–1.7)
ALP SERPL-CCNC: 117 U/L (ref 45–117)
ALT SERPL-CCNC: 142 U/L (ref 13–56)
ANION GAP BLD CALC-SCNC: 8 MMOL/L (ref 3–18)
AST SERPL W P-5'-P-CCNC: 74 U/L (ref 15–37)
BASOPHILS # BLD AUTO: 0 K/UL (ref 0–0.06)
BASOPHILS # BLD: 0 % (ref 0–2)
BILIRUB DIRECT SERPL-MCNC: 0.9 MG/DL (ref 0–0.2)
BILIRUB SERPL-MCNC: 1.4 MG/DL (ref 0.2–1)
BUN SERPL-MCNC: 10 MG/DL (ref 7–18)
BUN/CREAT SERPL: 11 (ref 12–20)
CALCIUM SERPL-MCNC: 8.8 MG/DL (ref 8.5–10.1)
CHLORIDE SERPL-SCNC: 108 MMOL/L (ref 100–108)
CO2 SERPL-SCNC: 25 MMOL/L (ref 21–32)
CREAT SERPL-MCNC: 0.94 MG/DL (ref 0.6–1.3)
DIFFERENTIAL METHOD BLD: ABNORMAL
EOSINOPHIL # BLD: 0.2 K/UL (ref 0–0.4)
EOSINOPHIL NFR BLD: 1 % (ref 0–5)
ERYTHROCYTE [DISTWIDTH] IN BLOOD BY AUTOMATED COUNT: 12.9 % (ref 11.6–14.5)
GLOBULIN SER CALC-MCNC: 3.1 G/DL (ref 2–4)
GLUCOSE SERPL-MCNC: 108 MG/DL (ref 74–99)
HCT VFR BLD AUTO: 39.3 % (ref 35–45)
HGB BLD-MCNC: 12.9 G/DL (ref 12–16)
LIPASE SERPL-CCNC: 216 U/L (ref 73–393)
LYMPHOCYTES # BLD AUTO: 10 % (ref 21–52)
LYMPHOCYTES # BLD: 1.3 K/UL (ref 0.9–3.6)
MCH RBC QN AUTO: 32.4 PG (ref 24–34)
MCHC RBC AUTO-ENTMCNC: 32.8 G/DL (ref 31–37)
MCV RBC AUTO: 98.7 FL (ref 74–97)
MONOCYTES # BLD: 1.6 K/UL (ref 0.05–1.2)
MONOCYTES NFR BLD AUTO: 12 % (ref 3–10)
NEUTS SEG # BLD: 10 K/UL (ref 1.8–8)
NEUTS SEG NFR BLD AUTO: 77 % (ref 40–73)
PLATELET # BLD AUTO: 312 K/UL (ref 135–420)
PMV BLD AUTO: 9.9 FL (ref 9.2–11.8)
POTASSIUM SERPL-SCNC: 4.2 MMOL/L (ref 3.5–5.5)
PROT SERPL-MCNC: 6.1 G/DL (ref 6.4–8.2)
RBC # BLD AUTO: 3.98 M/UL (ref 4.2–5.3)
SODIUM SERPL-SCNC: 141 MMOL/L (ref 136–145)
WBC # BLD AUTO: 13.1 K/UL (ref 4.6–13.2)

## 2017-02-10 PROCEDURE — 80048 BASIC METABOLIC PNL TOTAL CA: CPT | Performed by: PHYSICIAN ASSISTANT

## 2017-02-10 PROCEDURE — 74011250636 HC RX REV CODE- 250/636: Performed by: PHYSICIAN ASSISTANT

## 2017-02-10 PROCEDURE — 83690 ASSAY OF LIPASE: CPT | Performed by: PHYSICIAN ASSISTANT

## 2017-02-10 PROCEDURE — 85025 COMPLETE CBC W/AUTO DIFF WBC: CPT | Performed by: PHYSICIAN ASSISTANT

## 2017-02-10 PROCEDURE — 80076 HEPATIC FUNCTION PANEL: CPT | Performed by: PHYSICIAN ASSISTANT

## 2017-02-10 PROCEDURE — 74011250637 HC RX REV CODE- 250/637: Performed by: PHYSICIAN ASSISTANT

## 2017-02-10 PROCEDURE — 36415 COLL VENOUS BLD VENIPUNCTURE: CPT | Performed by: PHYSICIAN ASSISTANT

## 2017-02-10 RX ADMIN — LOSARTAN POTASSIUM 100 MG: 50 TABLET ORAL at 09:47

## 2017-02-10 RX ADMIN — PANTOPRAZOLE SODIUM 40 MG: 40 TABLET, DELAYED RELEASE ORAL at 09:47

## 2017-02-10 RX ADMIN — AMLODIPINE BESYLATE 10 MG: 10 TABLET ORAL at 09:47

## 2017-02-10 RX ADMIN — DEXTROSE MONOHYDRATE, SODIUM CHLORIDE, AND POTASSIUM CHLORIDE 125 ML/HR: 50; 4.5; 1.49 INJECTION, SOLUTION INTRAVENOUS at 03:56

## 2017-02-10 NOTE — DISCHARGE INSTRUCTIONS
Colangiopancreatografía retrógrada endoscópica: Antes del procedimiento - [ Endoscopic Retrograde Cholangiopancreatogram (ERCP): Before Your Procedure ]  ¿Qué es anita colangiopancreatografía retrógrada endoscópica? Anita colangiopancreatografía retrógrada endoscópica (ERCP, por georgiana siglas en inglés) es anita prueba para examinar los tubos que transportan líquidos desde el hígado, la vesícula biliar y el páncreas. Estos tubos se llaman conductos. Para hacer esta prueba, el médico utilizará un instrumento llamado endoscopio. Es un tubo lawson y flexible con jairo. Tiene anita cámara que le permite al médico utilizar imágenes en anita pantalla para guiarlo. Esta prueba se hace por diferentes razones. Puede ayudar a determinar la causa de georgiana síntomas. Si la prueba muestra cálculos biliares o un punto estrecho en un conducto biliar, el médico puede usar el endoscopio para extraer los cálculos o ensanchar el conducto. También podría colocar un tubo de metal o de plástico en el conducto. [place] puede ayudar a ensancharlo. Antes de la prueba, es posible que le administren un medicamento para adormecerle el fondo de la garganta. También le darán medicamentos para ayudarle a relajarse. Jeni la prueba, ted estará [address] rios lado shaji o boca abajo. El médico le colocará el endoscopio en la boca y luego lo moverá suavemente hacia la parte trasera de la garganta. El médico le dirá cuándo debe tragar. [place] ayudará a que el endoscopio baje por rios garganta. Usted podrá respirar con normalidad. A continuación, el médico moverá el endoscopio a lo brando del tubo (esófago) que conduce hasta el estómago, a través del estómago y hasta la primera parte del intestino lawson. Cuando el endoscopio alcance el lugar en que los conductos biliares y el páncreas se encuentran con el intestino lawson, usted podría darse la vuelta y [place] rios abdomen.  Entonces, el médico colocará un pequeño tubo de plástico dentro del endoscopio para inyectarle un material de [facility]. Manda material de [employer] a que los conductos puedan verse en las radiografías (ly X). Después, el médico miguel radiografías para ayudar a detectar cualquier problema. La prueba dura entre 30 minutos y 2 horas. Es posible que regrese a rios hogar el mismo día. Rebecca si le hicieron un tratamiento danay la prueba, podría tener que pasar la noche en el hospital.  La atención de seguimiento es anita parte clave de rios tratamiento y seguridad. Asegúrese de hacer y acudir a todas las citas, y llame a rios médico si está teniendo problemas. También es anita buena idea saber los resultados de los exámenes y mantener anita lista de los medicamentos que miguel. ¿Qué ocurre antes del procedimiento? Los procedimientos pueden ser estresantes. Esta información le ayudará a entender qué puede esperar. Y le ayudará a prepararse de manera hector para rios procedimiento. Cómo prepararse para el procedimiento  · [place] exactamente qué procedimiento está planificado, junto con los [place], los beneficios y las otras alternativas. · Infórmeles a georgiana médicos sobre [employer], las vitaminas, los suplementos y los bertram herbarios que miguel. Algunos de [employer] aumentar el riesgo de sangrado o interactuar con la anestesia. · Si miguel anticoagulantes, srinivasan warfarina (Coumadin), clopidogrel (Plavix) o aspirina, asegúrese de hablar con rios médico. Le dirá si debe dejar de batsheva estos medicamentos antes del procedimiento. Asegúrese de entender exactamente lo que rios médico quiere que nehemias. · Rios médico le dirá qué medicamentos batsheva o dejar de batsheva antes del procedimiento. Es posible que deba dejar de batsheva ciertos medicamentos anita semana o más antes del procedimiento, así que hable con rios médico tan pronto srinivasan pueda. · Informe a rios médico si tiene instrucciones médicas por anticipado.  Estas pueden incluir un testamento vital y un poder legal permanente para la [employer]. Lleve consigo anita copia cuando vaya al hospital. Si no las tiene, sería conveniente prepararlas. Kelsey Wilner Morales médico y seres queridos sabrán georgiana deseos sobre la [facility]. Los médicos recomiendan que todas las personas preparen estos documentos antes de cualquier tipo de [place] o procedimiento. ¿Qué ocurre el día del procedimiento? · Siga en forma precisa las instrucciones sobre cuándo debe dejar de comer y beber. Si no lo hace, el procedimiento podría ser cancelado. Si rios médico le dijo que tome georgiana medicamentos el día del procedimiento, tómelos con un solo sorbo de agua. · Báñese o dúchese antes de acudir al hospital para el procedimiento. No use lociones, perfumes, desodorantes ni esmalte de uñas. · [place] todas las joyas y los \"piercings\". Si lleva lentes de contacto, quíteselos también. En el hospital o centro quirúrgico  · Lleve un documento de identidad con foto. · Es posible que se le dé un medicamento que lo relajará o adormecerá ligeramente. La alexandria que se va a tratar estará adormecida. · Dígale al médico si es alérgico al yodo. Manda se encuentra en el material de contraste que el médico introduce en los conductos biliares. · Para poder michelle mejor, el médico podría insuflar pequeñas ráfagas de aire a través del tubo. [place] podría hacerle sentir abotagado. También podría tener cólicos. Esta sensación no dura por [employer]. · Podría sentir un poco de abotagamiento o cólicos a medida que el tubo se [place]. Si siente muchas molestias, puede avisarle al [employer] mediante anita señal que hayan acordado previamente o con tocarle el brazo. · Luego, permanecerá en el hospital o la clínica danay 1 o 2 horas, hasta que el efecto del medicamento haya desaparecido. El regreso a rios hogar  · Asegúrese de que alguien lo lleve a rios hogar. La anestesia y los analgésicos (medicamentos para el dolor) hacen que no sea seguro que siddhartha Ibarra.   · Recibirá instrucciones más específicas acerca de la recuperación del procedimiento. [place] [employer] alimentación, el cuidado de las heridas, la atención de seguimiento, el manejo de vehículos y la vuelta a rios rutina habitual.  ¿Cuándo debe llamar a rios médico?  · Tiene preguntas o inquietudes. · No entiende cómo prepararse para el procedimiento. · Se enferma antes del procedimiento (por ejemplo, tiene fiebre, un resfriado o gripe). · Necesita reprogramar el procedimiento o cambió de opinión acerca de hacerse el procedimiento. ¿Dónde puede encontrar más información en inglés? Fina Lazcano a http://adele-gardenia.info/. Escriba F420 en la búsqueda para aprender más acerca de \"Colangiopancreatografía retrógrada endoscópica: Antes del procedimiento - [ Endoscopic Retrograde Cholangiopancreatogram (ERCP): Before Your Procedure ]. \"  Revisado: 9 agosto, 2016  Versión del contenido: 11.1  © 7633-4133 Healthwise, Incorporated. Las instrucciones de cuidado fueron adaptadas bajo licencia por [employer] (which disclaims liability or warranty for this information). Si usted tiene [place] afección médica o sobre estas instrucciones, siempre pregunte a rios profesional de marcia. Healthwise, Incorporated niega toda garantía o responsabilidad por rios uso de esta información.     .Patient armband removed and shredded  DISCHARGE SUMMARY from Nurse    The following personal items are in your possession at time of discharge:    Dental Appliances: Partials        Home Medications: None  Jewelry: Watch  Clothing: Pajamas, Footwear  Other Valuables: Eyeglasses, Purse             PATIENT INSTRUCTIONS:    After general anesthesia or intravenous sedation, for 24 hours or while taking prescription Narcotics:  · Limit your activities  · Do not drive and operate hazardous machinery  · Do not make important personal or business decisions  · Do  not drink alcoholic beverages  · If you have not urinated within 8 hours after discharge, please contact your surgeon on call. Report the following to your surgeon:  · Excessive pain, swelling, redness or odor of or around the surgical area  · Temperature over 100.5  · Nausea and vomiting lasting longer than 4 hours or if unable to take medications  · Any signs of decreased circulation or nerve impairment to extremity: change in color, persistent  numbness, tingling, coldness or increase pain  · Any questions        What to do at Home:  Recommended activity: Activity as tolerated,     If you experience any of the following symptoms severe abdominal pain, worsening nausea or vomiting, fever greater 100.5, please follow up with PCP or call 911. *  Please give a list of your current medications to your Primary Care Provider. *  Please update this list whenever your medications are discontinued, doses are      changed, or new medications (including over-the-counter products) are added. *  Please carry medication information at all times in case of emergency situations. These are general instructions for a healthy lifestyle:    No smoking/ No tobacco products/ Avoid exposure to second hand smoke    Surgeon General's Warning:  Quitting smoking now greatly reduces serious risk to your health. Obesity, smoking, and sedentary lifestyle greatly increases your risk for illness    A healthy diet, regular physical exercise & weight monitoring are important for maintaining a healthy lifestyle    You may be retaining fluid if you have a history of heart failure or if you experience any of the following symptoms:  Weight gain of 3 pounds or more overnight or 5 pounds in a week, increased swelling in our hands or feet or shortness of breath while lying flat in bed. Please call your doctor as soon as you notice any of these symptoms; do not wait until your next office visit.     Recognize signs and symptoms of STROKE:    F-face looks uneven    A-arms unable to move or move unevenly    S-speech slurred or non-existent    T-time-call 911 as soon as signs and symptoms begin-DO NOT go       Back to bed or wait to see if you get better-TIME IS BRAIN. Warning Signs of HEART ATTACK     Call 911 if you have these symptoms:   Chest discomfort. Most heart attacks involve discomfort in the center of the chest that lasts more than a few minutes, or that goes away and comes back. It can feel like uncomfortable pressure, squeezing, fullness, or pain.  Discomfort in other areas of the upper body. Symptoms can include pain or discomfort in one or both arms, the back, neck, jaw, or stomach.  Shortness of breath with or without chest discomfort.  Other signs may include breaking out in a cold sweat, nausea, or lightheadedness. Don't wait more than five minutes to call 911 - MINUTES MATTER! Fast action can save your life. Calling 911 is almost always the fastest way to get lifesaving treatment. Emergency Medical Services staff can begin treatment when they arrive -- up to an hour sooner than if someone gets to the hospital by car. The discharge information has been reviewed with the patient. The patient verbalized understanding. Discharge medications reviewed with the patient and appropriate educational materials and side effects teaching were provided.

## 2017-02-10 NOTE — PROGRESS NOTES
Internal Medicine Progress Note    Patient's Name: Arva Apgar  Admit Date: 2017  Length of Stay: 1      Assessment/Plan     #Acute Mild Pancreatitis s/p Choledocholithiasis s/p ERCP and Sphincterotomy and Stone Extraction POD #1  #HARI  #Transaminitis  #Leukocytosis  #HTN  #GERD  #Insomnia  #DVT PPx     - F/u GI. Pain control PRN. Anti-emetic PRN. IVFs  - Renal fxn back to WNL. Trend BMP  - Trending down. Trend LFTs  - Likely 2/2 stress. Cont to observe off antibx. Trend CBC  - Cont norvasc, cozaar  - Cont PPI  - Cont ambien  - SCDs    I have personally reviewed all pertinent labs and films that have officially resulted over the last 24 hours. I have personally checked for all pending labs that are awaiting final results.     Subjective     Pt s/e @ bedside  No major events overnight  Pt offers no complaints this AM  Denies CP or SOB  Denies abd pain    Objective     Visit Vitals    /79    Pulse 80    Temp 97.4 °F (36.3 °C)    Resp 22    Wt 63.9 kg (140 lb 14.4 oz)    SpO2 94%    BMI 25.77 kg/m2       Physical Exam:  General Appearance: NAD, conversant  Lungs: CTA with normal respiratory effort  CV: RRR, no m/r/g  Abdomen: soft, non-tender, normal bowel sounds  Extremities: no cyanosis, no peripheral edema  Psych: appropriate affect, alert and oriented to person, place and time     Lab/Data Reviewed:  CMP:         Lab Results   Component Value Date/Time      2017 04:01 AM     K 4.2 2017 04:01 AM      2017 04:01 AM     CO2 25 2017 04:01 AM     AGAP 8 2017 04:01 AM      (H) 2017 04:01 AM     BUN 17 2017 04:01 AM     CREA 1.06 2017 04:01 AM     GFRAA >60 2017 04:01 AM     GFRNA 50 (L) 2017 04:01 AM     CA 8.2 (L) 2017 04:01 AM     ALB 2.9 (L) 2017 04:01 AM     TP 5.5 (L) 2017 04:01 AM     GLOB 2.6 2017 04:01 AM     AGRAT 1.1 2017 04:01 AM     SGOT 89 (H) 2017 04:01 AM      (H) 02/09/2017 04:01 AM      CBC:         Lab Results   Component Value Date/Time     WBC 20.2 (H) 02/09/2017 04:01 AM     HGB 11.8 (L) 02/09/2017 04:01 AM     HCT 36.3 02/09/2017 04:01 AM      02/09/2017 04:01 AM      Liver Panel:         Lab Results   Component Value Date/Time     ALB 2.9 (L) 02/09/2017 04:01 AM     CBIL 2.5 (H) 02/09/2017 04:01 AM     TP 5.5 (L) 02/09/2017 04:01 AM     GLOB 2.6 02/09/2017 04:01 AM     AGRAT 1.1 02/09/2017 04:01 AM     SGOT 89 (H) 02/09/2017 04:01 AM      (H) 02/09/2017 04:01 AM     AP 82 02/09/2017 04:01 AM          Imaging Reviewed:  No results found.     Medications Reviewed:  Current Facility-Administered Medications   Medication Dose Route Frequency    acetaminophen (TYLENOL) tablet 650 mg  650 mg Oral Q4H PRN    ondansetron (ZOFRAN) injection 4 mg  4 mg IntraVENous Q4H PRN    amLODIPine (NORVASC) tablet 10 mg  10 mg Oral DAILY    pantoprazole (PROTONIX) tablet 40 mg  40 mg Oral ACB    losartan (COZAAR) tablet 100 mg  100 mg Oral DAILY    zolpidem (AMBIEN) tablet 5 mg  5 mg Oral QHS PRN    dextrose 5% - 0.45% NaCl with KCl 20 mEq/L infusion  125 mL/hr IntraVENous CONTINUOUS    morphine injection 2 mg  2 mg IntraVENous Q4H PRN           Amol Elizabeth DO  Internal Medicine, Hospitalist  Pager: [phone]  [address] [place] [address]

## 2017-02-10 NOTE — PROGRESS NOTES
Care Management Interventions  PCP Verified by CM: Yes  Palliative Care Consult (Criteria: CHF and RRAT>21): No  Reason for No Palliative Care Consult: Other (see comment) (na)  Mode of Transport at Discharge: Other (see comment) (daughter Singh Singletary [phone])  Transition of Care Consult (CM Consult): Discharge Planning  Discharge Durable Medical Equipment: No  Physical Therapy Consult: No  Occupational Therapy Consult: No  Speech Therapy Consult: No  Current Support Network:  Other (Daughter and grand daughter lives with her in her home)  Confirm Follow Up Transport: Family  Plan discussed with Pt/Family/Caregiver: Yes  Discharge Location  Discharge Placement: Home

## 2017-02-10 NOTE — ROUTINE PROCESS
1945: Assumed care. Assessment done. Denies any pain or discomfort at this time. Call light within reach. 2308: No change from previous assessment. 0030: Quietly sleeping. 0230: No change from previous assessment. 0435: Quietly sleeping.  0658: Slept good thru night. Needs attended. No change from previous assessment. No n/v or abd pain. 0730: Bedside and Verbal shift change report given to Preston Gasca RN (oncoming nurse) by Makenzie Leo RN (offgoing nurse). Report included the following information SBAR, Kardex, Intake/Output, MAR and Recent Results.

## 2017-02-10 NOTE — PROGRESS NOTES
PROGRESS NOTE   PATIENT:  Carla Guillory           MRN: [id number]           Nayely, [birth year]           2/10/2017, 8:07 AM      I      SUBJECTIVE:  No abdominal pain, nausea or vomiting. Tolerating clear liquids. Hungry for regular food. OBJECTIVE:  Patient Vitals for the past 24 hrs:   Temp Pulse Resp BP SpO2   02/10/17 0658 98.3 °F (36.8 °C) 68 18 150/78 92 %   02/10/17 0230 98.4 °F (36.9 °C) 61 18 153/78 92 %   02/09/17 2308 98.6 °F (37 °C) 73 18 130/74 98 %   02/09/17 1810 98.3 °F (36.8 °C) 78 18 127/70 98 %   02/09/17 1403 97.4 °F (36.3 °C) 68 20 125/68 92 %   02/09/17 0915 97.4 °F (36.3 °C) 80 22 160/79 94 %         Intake/Output Summary (Last 24 hours) at 02/10/17 0807  Last data filed at 02/10/17 0356   Gross per 24 hour   Intake          2309.58 ml   Output             4450 ml   Net         -2140.42 ml       Gen: NAD  Lungs: Clear B/L   CVS exam: Regular rate and rhythm   Abd  : Soft, non tender, BS +, No masses felt. Labs: Results:   Chemistry Recent Labs      02/10/17   0350  02/09/17   0401  02/08/17   0922   GLU  108*  108*  126*   NA  141  141  139   K  4.2  4.2  3.4*   CL  108  108  103   CO2  25  25  23   BUN  10  17  22*   CREA  0.94  1.06  1.22   CA  8.8  8.2*  8.9   AGAP  8  8  13   BUCR  11*  16  18   AP  117  82  86   TP  6.1*  5.5*  6.8   ALB  3.0*  2.9*  3.7   GLOB  3.1  2.6  3.1   AGRAT  1.0  1.1  1.2    Estimated Creatinine Clearance: 43.9 mL/min (based on Cr of 0.94).    CBC w/Diff Recent Labs      02/10/17   0350  02/09/17   0401  02/08/17   0922   WBC  13.1  20.2*  14.2*   RBC  3.98*  3.67*  4.34   HGB  12.9  11.8*  14.5   HCT  39.3  36.3  41.9   PLT  312  286  261   GRANS  77*  83*  95*   LYMPH  10*  7*  4*   EOS  1  1  0                      Liver Enzymes Recent Labs      02/10/17   0350  02/09/17   0401  02/08/17   0922   TP  6.1*  5.5*  6.8   ALB  3.0*  2.9*  3.7   TBILI  1.4*  3.3*  4.6*   AP  117  82  86   SGOT  74*  89*  119*   ALT  142*  125*  127* Thyroid Studies No results for input(s): T4, T3U, TSH, TSHEXT in the last 72 hours. No lab exists for component: T3RU     Pathology pathology         Allergies   Allergen Reactions    Codeine Other (comments)     Chills, Loss of consciousness     Norco [Hydrocodone-Acetaminophen] Nausea Only    Vistaril [Hydroxyzine Pamoate] Vertigo       Current Facility-Administered Medications   Medication Dose Route Frequency    acetaminophen (TYLENOL) tablet 650 mg  650 mg Oral Q4H PRN    ondansetron (ZOFRAN) injection 4 mg  4 mg IntraVENous Q4H PRN    amLODIPine (NORVASC) tablet 10 mg  10 mg Oral DAILY    pantoprazole (PROTONIX) tablet 40 mg  40 mg Oral ACB    losartan (COZAAR) tablet 100 mg  100 mg Oral DAILY    zolpidem (AMBIEN) tablet 5 mg  5 mg Oral QHS PRN    dextrose 5% - 0.45% NaCl with KCl 20 mEq/L infusion  125 mL/hr IntraVENous CONTINUOUS    morphine injection 2 mg  2 mg IntraVENous Q4H PRN       ASSESSMENT:  Principal Problem:    Choledocholithiasis (2/8/2017)    Active Problems:    History of gastrointestinal hemorrhage (5/12/2010)      Overview: Overview:       History of hemorragic gastritis diagnosed by EGD 1973      Incontinence ()      Hypertension ()      Hyperlipidemia ()      GERD (gastroesophageal reflux disease) ()      PATRICIA (generalized anxiety disorder) ()      CKD (chronic kidney disease), stage III ()      Persistent vomiting (2/8/2017)      Elevated LFTs (2/8/2017)      Leukocytosis (2/8/2017)      Post-ERCP pancreatitis, resolving. Patient is asymptomatic at present. Leukocytosis is significantly better today. Advance diet (low fat) and discharge home with a two to three week follow-up with Dr Erika Hannon.      Eppie Gosselin, MD

## 2017-02-10 NOTE — ROUTINE PROCESS
Bedside and Verbal shift change report given to Kendra Kay RN (oncoming nurse) by Karlo Barrera RN (offgoing nurse). Report included the following information SBAR and Kardex.

## 2017-02-11 NOTE — ROUTINE PROCESS
0810-Assessment completed, call bell within reach, no distress noted, voiced no complaints. 0947-am medications given. 1120-discharge instructions given, verbalized understanding, discharged home via wheel chair with daughter.

## 2017-02-17 NOTE — ANCILLARY DISCHARGE INSTRUCTIONS
[facility] [address]  Discharge Phone Call       After-Care Discharge Phone Call Questions: No answer    Were you able to get your prescriptions filled? Comment:      [] Yes  []No    Comment if answer is \"No\"   Are you taking your medication(s) as your doctor ordered? Do you understand the purpose of your medications? Comment:    [] Yes  []No    Comment if answer is \"No\"   Are you taking any other medications that are not on the list?  Comment:      [] Yes  []No    Comment if answer is \"Yes\"   Do you have any questions about your medications? Comment:    [] Yes  []No    Comment if answer is \"Yes\"   Did you make your follow-up appointments (if the hospital did not do this before  discharge)? Comment:    [] Yes  []No    Comment if answer is \"No\"   Is there any reason you might not be able to keep your follow-up appointments? Comment:     [] Yes  []No    Comment if answer is \"Yes\"   Do you have any questions about your care plan? Comment:    [] Yes  []No    Comment if answer is \"Yes\"   Do you have a good understanding of how you should manage your health? Comment:    [] Yes  []No    Comment if answer is \"Yes\"   Do you know which symptoms to watch for that would mean you would need to call your doctor right away? Comment:      [] Yes  []No    Comment if answer is \"No\"   Do you have any questions about the follow up process or any instructions that we have provided? Comment:    [] Yes  []No    Comment if answer is \"Yes\"   Did staff take your preferences into account?

## 2017-05-03 ENCOUNTER — HOSPITAL ENCOUNTER (OUTPATIENT)
Dept: MAMMOGRAPHY | Age: 77
Discharge: HOME | End: 2017-05-03
Attending: NURSE PRACTITIONER

## 2017-05-03 DIAGNOSIS — Z12.31 VISIT FOR SCREENING MAMMOGRAM: ICD-10-CM

## 2017-09-28 ENCOUNTER — APPOINTMENT (OUTPATIENT)
Dept: CT IMAGING | Age: 77
DRG: 419 | End: 2017-09-28
Attending: INTERNAL MEDICINE
Payer: MEDICARE

## 2017-09-28 ENCOUNTER — APPOINTMENT (OUTPATIENT)
Dept: ULTRASOUND IMAGING | Age: 77
DRG: 419 | End: 2017-09-28
Attending: EMERGENCY MEDICINE
Payer: MEDICARE

## 2017-09-28 ENCOUNTER — APPOINTMENT (OUTPATIENT)
Dept: GENERAL RADIOLOGY | Age: 77
DRG: 419 | End: 2017-09-28
Attending: EMERGENCY MEDICINE
Payer: MEDICARE

## 2017-09-28 ENCOUNTER — HOSPITAL ENCOUNTER (INPATIENT)
Age: 77
LOS: 4 days | Discharge: HOME | DRG: 419 | End: 2017-10-02
Attending: EMERGENCY MEDICINE | Admitting: INTERNAL MEDICINE
Payer: MEDICARE

## 2017-09-28 DIAGNOSIS — R10.11 RUQ PAIN: ICD-10-CM

## 2017-09-28 DIAGNOSIS — R79.89 ELEVATED LFTS: Primary | ICD-10-CM

## 2017-09-28 PROBLEM — R10.9 INTRACTABLE ABDOMINAL PAIN: Status: ACTIVE | Noted: 2017-09-28

## 2017-09-28 LAB
ALBUMIN SERPL-MCNC: 3.2 G/DL (ref 3.4–5)
ALBUMIN/GLOB SERPL: 0.9 {RATIO} (ref 0.8–1.7)
ALP SERPL-CCNC: 324 U/L (ref 45–117)
ALT SERPL-CCNC: 304 U/L (ref 13–56)
ANION GAP SERPL CALC-SCNC: 8 MMOL/L (ref 3–18)
APPEARANCE UR: CLEAR
AST SERPL-CCNC: 100 U/L (ref 15–37)
BACTERIA URNS QL MICRO: ABNORMAL /HPF
BASOPHILS # BLD: 0 K/UL (ref 0–0.06)
BASOPHILS NFR BLD: 0 % (ref 0–2)
BILIRUB DIRECT SERPL-MCNC: 1.7 MG/DL (ref 0–0.2)
BILIRUB SERPL-MCNC: 2.2 MG/DL (ref 0.2–1)
BILIRUB UR QL: ABNORMAL
BUN SERPL-MCNC: 13 MG/DL (ref 7–18)
BUN/CREAT SERPL: 15 (ref 12–20)
CALCIUM SERPL-MCNC: 8.3 MG/DL (ref 8.5–10.1)
CHLORIDE SERPL-SCNC: 107 MMOL/L (ref 100–108)
CK MB CFR SERPL CALC: NORMAL % (ref 0–4)
CK MB SERPL-MCNC: <1 NG/ML (ref 5–25)
CK SERPL-CCNC: 49 U/L (ref 26–192)
CO2 SERPL-SCNC: 24 MMOL/L (ref 21–32)
COLOR UR: ABNORMAL
CREAT SERPL-MCNC: 0.85 MG/DL (ref 0.6–1.3)
DIFFERENTIAL METHOD BLD: ABNORMAL
EOSINOPHIL # BLD: 0.4 K/UL (ref 0–0.4)
EOSINOPHIL NFR BLD: 4 % (ref 0–5)
EPITH CASTS URNS QL MICRO: ABNORMAL /LPF (ref 0–5)
ERYTHROCYTE [DISTWIDTH] IN BLOOD BY AUTOMATED COUNT: 13.3 % (ref 11.6–14.5)
GLOBULIN SER CALC-MCNC: 3.5 G/DL (ref 2–4)
GLUCOSE SERPL-MCNC: 92 MG/DL (ref 74–99)
GLUCOSE UR STRIP.AUTO-MCNC: NEGATIVE MG/DL
HCT VFR BLD AUTO: 38.3 % (ref 35–45)
HGB BLD-MCNC: 13.1 G/DL (ref 12–16)
HGB UR QL STRIP: ABNORMAL
KETONES UR QL STRIP.AUTO: ABNORMAL MG/DL
LACTATE SERPL-SCNC: 0.7 MMOL/L (ref 0.4–2)
LEUKOCYTE ESTERASE UR QL STRIP.AUTO: ABNORMAL
LIPASE SERPL-CCNC: 213 U/L (ref 73–393)
LYMPHOCYTES # BLD: 1.1 K/UL (ref 0.9–3.6)
LYMPHOCYTES NFR BLD: 11 % (ref 21–52)
MCH RBC QN AUTO: 33.2 PG (ref 24–34)
MCHC RBC AUTO-ENTMCNC: 34.2 G/DL (ref 31–37)
MCV RBC AUTO: 97 FL (ref 74–97)
MONOCYTES # BLD: 1.4 K/UL (ref 0.05–1.2)
MONOCYTES NFR BLD: 13 % (ref 3–10)
MUCOUS THREADS URNS QL MICRO: ABNORMAL /LPF
NEUTS SEG # BLD: 7.8 K/UL (ref 1.8–8)
NEUTS SEG NFR BLD: 72 % (ref 40–73)
NITRITE UR QL STRIP.AUTO: NEGATIVE
PH UR STRIP: 6 [PH] (ref 5–8)
PLATELET # BLD AUTO: 261 K/UL (ref 135–420)
PMV BLD AUTO: 9.3 FL (ref 9.2–11.8)
POTASSIUM SERPL-SCNC: 3.5 MMOL/L (ref 3.5–5.5)
PROT SERPL-MCNC: 6.7 G/DL (ref 6.4–8.2)
PROT UR STRIP-MCNC: NEGATIVE MG/DL
RBC # BLD AUTO: 3.95 M/UL (ref 4.2–5.3)
RBC #/AREA URNS HPF: ABNORMAL /HPF (ref 0–5)
SODIUM SERPL-SCNC: 139 MMOL/L (ref 136–145)
SP GR UR REFRACTOMETRY: 1.02 (ref 1–1.03)
TROPONIN I SERPL-MCNC: <0.02 NG/ML (ref 0–0.04)
UROBILINOGEN UR QL STRIP.AUTO: 4 EU/DL (ref 0.2–1)
WBC # BLD AUTO: 10.7 K/UL (ref 4.6–13.2)
WBC URNS QL MICRO: ABNORMAL /HPF (ref 0–4)

## 2017-09-28 PROCEDURE — 80048 BASIC METABOLIC PNL TOTAL CA: CPT | Performed by: EMERGENCY MEDICINE

## 2017-09-28 PROCEDURE — 82550 ASSAY OF CK (CPK): CPT | Performed by: EMERGENCY MEDICINE

## 2017-09-28 PROCEDURE — 76705 ECHO EXAM OF ABDOMEN: CPT

## 2017-09-28 PROCEDURE — 77030020263 HC SOL INJ SOD CL0.9% LFCR 1000ML

## 2017-09-28 PROCEDURE — 99285 EMERGENCY DEPT VISIT HI MDM: CPT

## 2017-09-28 PROCEDURE — 83690 ASSAY OF LIPASE: CPT | Performed by: EMERGENCY MEDICINE

## 2017-09-28 PROCEDURE — 96374 THER/PROPH/DIAG INJ IV PUSH: CPT

## 2017-09-28 PROCEDURE — 80076 HEPATIC FUNCTION PANEL: CPT | Performed by: EMERGENCY MEDICINE

## 2017-09-28 PROCEDURE — 36415 COLL VENOUS BLD VENIPUNCTURE: CPT | Performed by: INTERNAL MEDICINE

## 2017-09-28 PROCEDURE — 74011250636 HC RX REV CODE- 250/636: Performed by: INTERNAL MEDICINE

## 2017-09-28 PROCEDURE — 96375 TX/PRO/DX INJ NEW DRUG ADDON: CPT

## 2017-09-28 PROCEDURE — 96361 HYDRATE IV INFUSION ADD-ON: CPT

## 2017-09-28 PROCEDURE — 83605 ASSAY OF LACTIC ACID: CPT | Performed by: INTERNAL MEDICINE

## 2017-09-28 PROCEDURE — 80074 ACUTE HEPATITIS PANEL: CPT | Performed by: INTERNAL MEDICINE

## 2017-09-28 PROCEDURE — 74011000258 HC RX REV CODE- 258: Performed by: EMERGENCY MEDICINE

## 2017-09-28 PROCEDURE — 65270000029 HC RM PRIVATE

## 2017-09-28 PROCEDURE — 81001 URINALYSIS AUTO W/SCOPE: CPT | Performed by: EMERGENCY MEDICINE

## 2017-09-28 PROCEDURE — 74011636320 HC RX REV CODE- 636/320: Performed by: EMERGENCY MEDICINE

## 2017-09-28 PROCEDURE — 85025 COMPLETE CBC W/AUTO DIFF WBC: CPT | Performed by: EMERGENCY MEDICINE

## 2017-09-28 PROCEDURE — 77010033678 HC OXYGEN DAILY

## 2017-09-28 PROCEDURE — 96376 TX/PRO/DX INJ SAME DRUG ADON: CPT

## 2017-09-28 PROCEDURE — 93005 ELECTROCARDIOGRAM TRACING: CPT

## 2017-09-28 PROCEDURE — 74011250636 HC RX REV CODE- 250/636: Performed by: EMERGENCY MEDICINE

## 2017-09-28 PROCEDURE — 71010 XR CHEST PORT: CPT

## 2017-09-28 PROCEDURE — 74177 CT ABD & PELVIS W/CONTRAST: CPT

## 2017-09-28 RX ORDER — ENOXAPARIN SODIUM 100 MG/ML
40 INJECTION SUBCUTANEOUS EVERY 24 HOURS
Status: DISCONTINUED | OUTPATIENT
Start: 2017-09-28 | End: 2017-09-30

## 2017-09-28 RX ORDER — FENTANYL CITRATE 50 UG/ML
100 INJECTION, SOLUTION INTRAMUSCULAR; INTRAVENOUS
Status: COMPLETED | OUTPATIENT
Start: 2017-09-28 | End: 2017-09-28

## 2017-09-28 RX ORDER — ONDANSETRON 2 MG/ML
4 INJECTION INTRAMUSCULAR; INTRAVENOUS
Status: DISCONTINUED | OUTPATIENT
Start: 2017-09-28 | End: 2017-10-02 | Stop reason: HOSPADM

## 2017-09-28 RX ORDER — MORPHINE SULFATE 10 MG/ML
5 INJECTION, SOLUTION INTRAMUSCULAR; INTRAVENOUS
Status: DISCONTINUED | OUTPATIENT
Start: 2017-09-28 | End: 2017-10-01

## 2017-09-28 RX ORDER — SODIUM CHLORIDE 9 MG/ML
125 INJECTION, SOLUTION INTRAVENOUS CONTINUOUS
Status: DISCONTINUED | OUTPATIENT
Start: 2017-09-28 | End: 2017-10-02

## 2017-09-28 RX ORDER — PANTOPRAZOLE SODIUM 40 MG/1
40 TABLET, DELAYED RELEASE ORAL
Status: DISCONTINUED | OUTPATIENT
Start: 2017-09-29 | End: 2017-10-02 | Stop reason: HOSPADM

## 2017-09-28 RX ORDER — FENTANYL CITRATE 50 UG/ML
50 INJECTION, SOLUTION INTRAMUSCULAR; INTRAVENOUS
Status: COMPLETED | OUTPATIENT
Start: 2017-09-28 | End: 2017-09-28

## 2017-09-28 RX ADMIN — SODIUM CHLORIDE 1000 ML: 900 INJECTION, SOLUTION INTRAVENOUS at 14:36

## 2017-09-28 RX ADMIN — SODIUM CHLORIDE 125 ML/HR: 900 INJECTION, SOLUTION INTRAVENOUS at 20:12

## 2017-09-28 RX ADMIN — FENTANYL CITRATE 100 MCG: 50 INJECTION, SOLUTION INTRAMUSCULAR; INTRAVENOUS at 15:03

## 2017-09-28 RX ADMIN — PROMETHAZINE HYDROCHLORIDE 12.5 MG: 25 INJECTION INTRAMUSCULAR; INTRAVENOUS at 16:02

## 2017-09-28 RX ADMIN — ENOXAPARIN SODIUM 40 MG: 40 INJECTION SUBCUTANEOUS at 20:12

## 2017-09-28 RX ADMIN — FENTANYL CITRATE 50 MCG: 50 INJECTION, SOLUTION INTRAMUSCULAR; INTRAVENOUS at 17:41

## 2017-09-28 RX ADMIN — IOPAMIDOL 94 ML: 612 INJECTION, SOLUTION INTRAVENOUS at 18:45

## 2017-09-28 NOTE — ED PROVIDER NOTES
HPI Comments: 2:11 PM  Erika Avina is a 68 y.o. female with a PMHx of gallstones and gastritis with bleeding who presents to the ED via EMS c/o severe, sharp generalized abd pain radiating to lower back. Also reports \"bleeding from my colon a lot,\" but denies bleeding now. Pt denies abd surgeries. On field, EMS reports /112, HR 90, RR 18, SaO2 97% NC. Pt was also given pain medications PTA. No other acute symptoms or complaints were noted. PCP: Flaca Ba MD        The history is provided by the patient. Past Medical History:   Diagnosis Date    Abnormal LFTs     Acute neck sprain     Bursitis     right shoulder    Cervicalgia     Chest pain     CKD (chronic kidney disease), stage III     Dyslipidemia     Dyspnea     PATRICIA (generalized anxiety disorder)     Gallstones     Gastritis     Gastritis with bleeding     GERD (gastroesophageal reflux disease)     Hordeolum     Hyperlipidemia     Hypertension     Incontinence     Insomnia     OAB (overactive bladder)     Pruritus        Past Surgical History:   Procedure Laterality Date    APPENDECTOMY      HX APPENDECTOMY      HX COLONOSCOPY      HX ENDOSCOPY      HX HYSTERECTOMY           Family History:   Problem Relation Age of Onset    No Known Problems Brother        Social History     Social History    Marital status:      Spouse name: N/A    Number of children: N/A    Years of education: N/A     Occupational History    Not on file. Social History Main Topics    Smoking status: Former Smoker     Quit date: 1/1/1980    Smokeless tobacco: Never Used    Alcohol use Yes      Comment: occassionally    Drug use: No    Sexual activity: Not on file     Other Topics Concern    Not on file     Social History Narrative         ALLERGIES: Codeine; Norco [hydrocodone-acetaminophen];  Tylox [oxycodone-acetaminophen]; and Vistaril [hydroxyzine pamoate]    Review of Systems   Gastrointestinal: Positive for abdominal pain. Musculoskeletal: Positive for back pain. All other systems reviewed and are negative. Vitals:    09/28/17 1431 09/28/17 1445 09/28/17 1530 09/28/17 1615   BP: 124/76 (!) 151/101 (!) 149/93 142/56   Pulse: 84 88 (!) 108 (!) 102   Resp: 19 18 (!) 33 30   Temp:       SpO2: 99% 99% 99% 100%   Weight:       Height:                Physical Exam   Constitutional: She appears well-developed and well-nourished. She appears distressed (in pain). HENT:   Head: Normocephalic and atraumatic. Neck: Normal range of motion. Cardiovascular: Normal rate and regular rhythm. Pulmonary/Chest: Effort normal and breath sounds normal. No stridor. No respiratory distress. Abdominal: Soft. Bowel sounds are normal. There is tenderness in the right upper quadrant. Musculoskeletal: Normal range of motion. She exhibits no edema. Neurological: She is alert. Skin: She is not diaphoretic. Psychiatric: She has a normal mood and affect. Nursing note and vitals reviewed. MDM  Number of Diagnoses or Management Options  Diagnosis management comments:  Old records reviweed    Hx of gallstones and choledocholith 2/17 - still with GB - has RUQ pain - will treat pain and check labs and US    EKG - NSR (80), nl axis, no ischemic changes no ectopy    ED Course       Procedures    Vitals:  Patient Vitals for the past 12 hrs:   Temp Pulse Resp BP SpO2   09/28/17 1615 - (!) 102 30 142/56 100 %   09/28/17 1530 - (!) 108 (!) 33 (!) 149/93 99 %   09/28/17 1445 - 88 18 (!) 151/101 99 %   09/28/17 1431 - 84 19 124/76 99 %   09/28/17 1422 98.2 °F (36.8 °C) - - - -   09/28/17 1421 - - - - 98 %   09/28/17 1416 - 87 16 - 98 %       Medications Ordered:  Medications   sodium chloride 0.9 % bolus infusion 1,000 mL (0 mL IntraVENous IV Completed 9/28/17 1536)   fentaNYL citrate (PF) injection 100 mcg (100 mcg IntraVENous Given 9/28/17 1503)   promethazine (PHENERGAN) 12.5 mg in 0.9% sodium chloride 50 mL IVPB (12.5 mg IntraVENous Given 9/28/17 1602)       Lab Findings:  Recent Results (from the past 12 hour(s))   CBC WITH AUTOMATED DIFF    Collection Time: 09/28/17  2:32 PM   Result Value Ref Range    WBC 10.7 4.6 - 13.2 K/uL    RBC 3.95 (L) 4.20 - 5.30 M/uL    HGB 13.1 12.0 - 16.0 g/dL    HCT 38.3 35.0 - 45.0 %    MCV 97.0 74.0 - 97.0 FL    MCH 33.2 24.0 - 34.0 PG    MCHC 34.2 31.0 - 37.0 g/dL    RDW 13.3 11.6 - 14.5 %    PLATELET 221 205 - 853 K/uL    MPV 9.3 9.2 - 11.8 FL    NEUTROPHILS 72 40 - 73 %    LYMPHOCYTES 11 (L) 21 - 52 %    MONOCYTES 13 (H) 3 - 10 %    EOSINOPHILS 4 0 - 5 %    BASOPHILS 0 0 - 2 %    ABS. NEUTROPHILS 7.8 1.8 - 8.0 K/UL    ABS. LYMPHOCYTES 1.1 0.9 - 3.6 K/UL    ABS. MONOCYTES 1.4 (H) 0.05 - 1.2 K/UL    ABS. EOSINOPHILS 0.4 0.0 - 0.4 K/UL    ABS. BASOPHILS 0.0 0.0 - 0.06 K/UL    DF AUTOMATED     HEPATIC FUNCTION PANEL    Collection Time: 09/28/17  2:32 PM   Result Value Ref Range    Protein, total 6.7 6.4 - 8.2 g/dL    Albumin 3.2 (L) 3.4 - 5.0 g/dL    Globulin 3.5 2.0 - 4.0 g/dL    A-G Ratio 0.9 0.8 - 1.7      Bilirubin, total 2.2 (H) 0.2 - 1.0 MG/DL    Bilirubin, direct 1.7 (H) 0.0 - 0.2 MG/DL    Alk.  phosphatase 324 (H) 45 - 117 U/L    AST (SGOT) 100 (H) 15 - 37 U/L    ALT (SGPT) 304 (H) 13 - 56 U/L   LIPASE    Collection Time: 09/28/17  2:32 PM   Result Value Ref Range    Lipase 213 73 - 209 U/L   METABOLIC PANEL, BASIC    Collection Time: 09/28/17  2:32 PM   Result Value Ref Range    Sodium 139 136 - 145 mmol/L    Potassium 3.5 3.5 - 5.5 mmol/L    Chloride 107 100 - 108 mmol/L    CO2 24 21 - 32 mmol/L    Anion gap 8 3.0 - 18 mmol/L    Glucose 92 74 - 99 mg/dL    BUN 13 7.0 - 18 MG/DL    Creatinine 0.85 0.6 - 1.3 MG/DL    BUN/Creatinine ratio 15 12 - 20      GFR est AA >60 >60 ml/min/1.73m2    GFR est non-AA >60 >60 ml/min/1.73m2    Calcium 8.3 (L) 8.5 - 10.1 MG/DL   CARDIAC PANEL,(CK, CKMB & TROPONIN)    Collection Time: 09/28/17  2:32 PM   Result Value Ref Range    CK 49 26 - 192 U/L    CK - MB <1.0 <3.6 ng/ml    CK-MB Index  0.0 - 4.0 %     CALCULATION NOT PERFORMED WHEN RESULT IS BELOW LINEAR LIMIT    Troponin-I, Qt. <0.02 0.0 - 0.045 NG/ML   EKG, 12 LEAD, INITIAL    Collection Time: 09/28/17  2:40 PM   Result Value Ref Range    Ventricular Rate 80 BPM    Atrial Rate 80 BPM    P-R Interval 178 ms    QRS Duration 72 ms    Q-T Interval 406 ms    QTC Calculation (Bezet) 468 ms    Calculated P Axis 51 degrees    Calculated R Axis 5 degrees    Calculated T Axis 29 degrees    Diagnosis       Normal sinus rhythm  Inferior infarct , age undetermined  Abnormal ECG  When compared with ECG of 07-FEB-2017 12:29,  No significant change was found     URINALYSIS W/ RFLX MICROSCOPIC    Collection Time: 09/28/17  4:00 PM   Result Value Ref Range    Color DARK YELLOW      Appearance CLEAR      Specific gravity 1.017 1.005 - 1.030      pH (UA) 6.0 5.0 - 8.0      Protein NEGATIVE  NEG mg/dL    Glucose NEGATIVE  NEG mg/dL    Ketone TRACE (A) NEG mg/dL    Bilirubin MODERATE (A) NEG      Blood SMALL (A) NEG      Urobilinogen 4.0 (H) 0.2 - 1.0 EU/dL    Nitrites NEGATIVE  NEG      Leukocyte Esterase TRACE (A) NEG     URINE MICROSCOPIC ONLY    Collection Time: 09/28/17  4:00 PM   Result Value Ref Range    WBC 0 to 3 0 - 4 /hpf    RBC 4 to 10 0 - 5 /hpf    Epithelial cells 1+ 0 - 5 /lpf    Bacteria FEW (A) NEG /hpf    Mucus FEW (A) NEG /lpf         X-ray, CT or radiology findings or impressions:  US ABD LTD   Final Result  IMPRESSION:   1. Mild intrahepatic and extra hepatic biliary ductal dilatation. No evidence of  common duct stone on the provided images.     2. Mildly distended gallbladder, nonspecific, without pericholecystic fluid or  wall thickening. No gallstones. Layering biliary sludge noted. As read by the radiologist.      XR CHEST PORT   Final Result  Impression:  Underexpanded lungs without superimposed acute radiographic abnormality.  As read by the radiologist.     Progress notes, consult notes, or additional procedure notes:  4:53 PM discussed with  (gastroenterologist)     Diagnosis: No diagnosis found. Disposition: Admit    Follow-up Information     None           Patient's Medications   Start Taking    No medications on file   Continue Taking    ESOMEPRAZOLE (NEXIUM) 40 MG CAPSULE    Take 40 mg by mouth two (2) times a day. MULTIVITAMIN WITH IRON TABLET    Take 1 Tab by mouth daily. These Medications have changed    No medications on file   Stop Taking    AMLODIPINE (NORVASC) 10 MG TABLET    Take  by mouth daily. BENZONATATE (TESSALON) 200 MG CAPSULE    Take 200 mg by mouth three (3) times daily as needed for Cough. DULCOLAX, BISACODYL, PO    Take  by mouth as needed. LOSARTAN (COZAAR) 100 MG TABLET    Take 100 mg by mouth daily. METRONIDAZOLE (METROGEL) 1 % TOPICAL GEL    Apply  to affected area daily. Use a thin layer to affected areas after washing    NYSTATIN-TRIAMCINOLONE (MYCOLOG II) TOPICAL CREAM    Apply  to affected area two (2) times a day. SOLIFENACIN (VESICARE) 10 MG TABLET    Take 1 Tab by mouth daily. ZOLPIDEM (AMBIEN) 5 MG TABLET    Take 5 mg by mouth nightly. Scribe Attestation      Elida James acting as a scribe for and in the presence of You Santos MD      September 28, 2017 at 2:15 PM       Provider Attestation:      I personally performed the services described in the documentation, reviewed the documentation, as recorded by the scribe in my presence, and it accurately and completely records my words and actions.  September 28, 2017 at 2:15 PM - You Santos MD

## 2017-09-28 NOTE — IP AVS SNAPSHOT
Concettafrancheska Palma 
 
 
 [address] [place] [address] 
[phone] Patient: Sarah Vicente MRN: [id number] KFQ:9/57/1995 You are allergic to the following Allergen Reactions Codeine Other (comments) Chills, Loss of consciousness Norco (Hydrocodone-Acetaminophen) Nausea Only Tylox (Oxycodone-Acetaminophen) Nausea Only Vistaril (Hydroxyzine Pamoate) Vertigo Immunizations Administered for This Admission Name Date Influenza Vaccine  Deferred (Patient Refused) Recent Documentation Height Weight Breastfeeding? BMI OB Status Smoking Status 1.6 m 64.4 kg No 25.15 kg/m2 Postmenopausal Former Smoker Unresulted Labs Order Current Status CULTURE, BLOOD Preliminary result CULTURE, BLOOD Preliminary result Emergency Contacts Name Discharge Info Relation Home Work Mobile [address] [facility] CAREGIVER [3] Daughter [21] [phone] About your hospitalization You were admitted on:  September 28, 2017 You last received care in the:  [address] [facility] You were discharged on:  October 2, 2017 Unit phone number:  [phone] Why you were hospitalized Your primary diagnosis was: Intractable Abdominal Pain Your diagnoses also included:  Hypertension, Hyperlipidemia, Gerd (Gastroesophageal Reflux Disease) Providers Seen During Your Hospitalizations Provider Role Specialty Primary office phone Janay Storm MD Attending Provider Emergency Medicine [phone] Tuan Muhammad DO Attending Provider Internal Medicine [phone] Alba Molina MD Attending Provider Internal Medicine [phone] Your Primary Care Physician (PCP) Primary Care Physician Office Phone Office Fax Tarah Tejeda [phone] Follow-up Information Follow up With Details Comments Contact Info Zoila James MD In 1 week  [facility] [address] [facility] [address] 
[phone] Beronica Whitfield MD Schedule an appointment as soon as possible for a visit Surgery follow-up in 2 weeks [address] [facility] [address]  [facility] [id number] [phone] Current Discharge Medication List  
  
CONTINUE these medications which have NOT CHANGED Dose & Instructions Dispensing Information Comments Morning Noon Evening Bedtime  
 esomeprazole 40 mg capsule Commonly known as:  Erin Ramireze Your last dose was: Your next dose is:    
   
   
 Dose:  40 mg Take 40 mg by mouth two (2) times a day. Refills:  0  
     
   
   
   
  
 multivitamin with iron tablet Your last dose was: Your next dose is:    
   
   
 Dose:  1 Tab Take 1 Tab by mouth daily. Refills:  0 Discharge Instructions DISCHARGE SUMMARY from Nurse The following personal items are in your possession at time of discharge: 
 
Dental Appliances: None Visual Aid: Glasses, With patient Home Medications: None Jewelry: Bracelet, Ring, Watch Clothing: None Other Valuables: None Personal Items Sent to Safe:  (na) PATIENT INSTRUCTIONS: 
 
After general anesthesia or intravenous sedation, for 24 hours or while taking prescription Narcotics: · Limit your activities · Do not drive and operate hazardous machinery · Do not make important personal or business decisions · Do  not drink alcoholic beverages · If you have not urinated within 8 hours after discharge, please contact your surgeon on call. Report the following to your surgeon: 
· Excessive pain, swelling, redness or odor of or around the surgical area · Temperature over 100.5 · Nausea and vomiting lasting longer than 4 hours or if unable to take medications · Any signs of decreased circulation or nerve impairment to extremity: change in color, persistent  numbness, tingling, coldness or increase pain · Any questions What to do at Home: 
Recommended activity: as prescribed by physician If you experience any of the following symptoms Signs and symptoms of a Stroke and Warning Signs of a Heart Attack as listed in the discharge teaching as \"When to call for Help\", please follow up with your primary care physician and/or call 911. *  Please give a list of your current medications to your Primary Care Provider. *  Please update this list whenever your medications are discontinued, doses are 
    changed, or new medications (including over-the-counter products) are added. *  Please carry medication information at all times in case of emergency situations. These are general instructions for a healthy lifestyle: No smoking/ No tobacco products/ Avoid exposure to second hand smoke Surgeon General's Warning:  Quitting smoking now greatly reduces serious risk to your health. Obesity, smoking, and sedentary lifestyle greatly increases your risk for illness A healthy diet, regular physical exercise & weight monitoring are important for maintaining a healthy lifestyle You may be retaining fluid if you have a history of heart failure or if you experience any of the following symptoms:  Weight gain of 3 pounds or more overnight or 5 pounds in a week, increased swelling in our hands or feet or shortness of breath while lying flat in bed. Please call your doctor as soon as you notice any of these symptoms; do not wait until your next office visit. Recognize signs and symptoms of STROKE: 
 
F-face looks uneven A-arms unable to move or move unevenly S-speech slurred or non-existent T-time-call 911 as soon as signs and symptoms begin-DO NOT go Back to bed or wait to see if you get better-TIME IS BRAIN. Warning Signs of HEART ATTACK Call 911 if you have these symptoms: 
? Chest discomfort. Most heart attacks involve discomfort in the center of the chest that lasts more than a few minutes, or that goes away and comes back. It can feel like uncomfortable pressure, squeezing, fullness, or pain. ? Discomfort in other areas of the upper body. Symptoms can include pain or discomfort in one or both arms, the back, neck, jaw, or stomach. ? Shortness of breath with or without chest discomfort. ? Other signs may include breaking out in a cold sweat, nausea, or lightheadedness. Don't wait more than five minutes to call [address]! Fast action can save your life. Calling 911 is almost always the fastest way to get lifesaving treatment. Emergency Medical Services staff can begin treatment when they arrive  up to an hour sooner than if someone gets to the hospital by car. The discharge information has been reviewed with the patient. The patient verbalized understanding. Discharge medications reviewed with the patient and appropriate educational materials and side effects teaching were provided. Abdominal Pain: Care Instructions Your Care Instructions Abdominal pain has many possible causes. Some aren't serious and get better on their own in a few days. Others need more testing and treatment. If your pain continues or gets worse, you need to be rechecked and may need more tests to find out what is wrong. You may need surgery to correct the problem. Don't ignore new symptoms, such as fever, nausea and vomiting, urination problems, pain that gets worse, and dizziness. These may be signs of a more serious problem. Your doctor may have recommended a follow-up visit in the next 8 to 12 hours. If you are not getting better, you may need more tests or treatment. The doctor has checked you carefully, but problems can develop later.  If you notice any problems or new symptoms, get medical treatment right away. Follow-up care is a key part of your treatment and safety. Be sure to make and go to all appointments, and call your doctor if you are having problems. It's also a good idea to know your test results and keep a list of the medicines you take. How can you care for yourself at home? · Rest until you feel better. · To prevent dehydration, drink plenty of fluids, enough so that your urine is light yellow or clear like water. Choose water and other caffeine-free clear liquids until you feel better. If you have kidney, heart, or liver disease and have to limit fluids, talk with your doctor before you increase the amount of fluids you drink. · If your stomach is upset, eat mild foods, such as rice, dry toast or crackers, bananas, and applesauce. Try eating several small meals instead of two or three large ones. · Wait until 48 hours after all symptoms have gone away before you have spicy foods, alcohol, and drinks that contain caffeine. · Do not eat foods that are high in fat. · Avoid anti-inflammatory medicines such as aspirin, ibuprofen (Advil, Motrin), and naproxen (Aleve). These can cause stomach upset. Talk to your doctor if you take daily aspirin for another health problem. When should you call for help? Call 911 anytime you think you may need emergency care. For example, call if: 
· You passed out (lost consciousness). · You pass maroon or very bloody stools. · You vomit blood or what looks like coffee grounds. · You have new, severe belly pain. Call your doctor now or seek immediate medical care if: 
· Your pain gets worse, especially if it becomes focused in one area of your belly. · You have a new or higher fever. · Your stools are black and look like tar, or they have streaks of blood. · You have unexpected vaginal bleeding. · You have symptoms of a urinary tract infection. These may include: 
¨ Pain when you urinate. ¨ Urinating more often than usual. 
¨ Blood in your urine. · You are dizzy or lightheaded, or you feel like you may faint. Watch closely for changes in your health, and be sure to contact your doctor if: 
· You are not getting better after 1 day (24 hours). Where can you learn more? Go to http://adele-gardenia.info/. Enter W475 in the search box to learn more about \"Abdominal Pain: Care Instructions. \" Current as of: March 20, 2017 Content Version: 11.3 © 4571-7287 [employer]. Care instructions adapted under license by [employer] (which disclaims liability or warranty for this information). If you have questions about a medical condition or this instruction, always ask your healthcare professional. [address] any warranty or liability for your use of this information. Discharge Instructions Attachments/References CHOLECYSTECTOMY: GENERAL INFO (ENGLISH) Discharge Orders None VISUALPLANT Announcement We are excited to announce that we are making your provider's discharge notes available to you in VISUALPLANT. You will see these notes when they are completed and signed by the physician that discharged you from your recent hospital stay. If you have any questions or concerns about any information you see in VISUALPLANT, please call the Health Information Department where you were seen or reach out to your Primary Care Provider for more information about your plan of care. Introducing [facility]! Maricruz Villarreal introduces VISUALPLANT patient portal. Now you can access parts of your medical record, email your doctor's office, and request medication refills online. 1. In your internet browser, go to [email or web address] 2. Click on the First Time User? Click Here link in the Sign In box. You will see the New Member Sign Up page. 3. Enter your Blogvio Access Code exactly as it appears below. You will not need to use this code after youve completed the sign-up process. If you do not sign up before the expiration date, you must request a new code. · Blogvio Access Code: [email or web address] Expires: 12/31/2017 10:13 AM 
 
4. Enter the last four digits of your Social Security Number (xxxx) and Date of Birth (mm/dd/yyyy) as indicated and click Submit. You will be taken to the next sign-up page. 5. Create a Blogvio ID. This will be your Blogvio login ID and cannot be changed, so think of one that is secure and easy to remember. 6. Create a Blogvio password. You can change your password at any time. 7. Enter your Password Reset Question and Answer. This can be used at a later time if you forget your password. 8. Enter your e-mail address. You will receive e-mail notification when new information is available in [address] 9. Click Sign Up. You can now view and download portions of your medical record. 10. Click the Download Summary menu link to download a portable copy of your medical information. If you have questions, please visit the Frequently Asked Questions section of the Blogvio website. Remember, Blogvio is NOT to be used for urgent needs. For medical emergencies, dial 911. Now available from your iPhone and Android! General Information Please provide this summary of care documentation to your next provider. Patient Signature:  ____________________________________________________________ Date:  ____________________________________________________________  
  
Surjit Singh Provider Signature:  ____________________________________________________________ Date:  ____________________________________________________________ More Information Learning About Cholecystectomy What is cholecystectomy Cholecystectomy (say \"koh-oneil-sis-MORAGN-tuh-eamon\") is surgery to remove the gallbladder and gallstones. The gallbladder stores bile made by your liver. The bile helps you digest fats. Gallstones are made of cholesterol and other things found in bile. Your body will work fine without a gallbladder. Bile will go straight from the liver to the intestine. There may be small changes in how you digest food. But you probably will not notice them. How is the surgery done? Cholecystectomy is usually laparoscopic surgery. To do this type of surgery, a doctor puts a lighted tube, or scope, and other surgical tools through small cuts (incisions) in your belly. The doctor is able to see your organs with the scope. After your gallbladder is removed, you will no longer have gallstones. The cuts leave scars that usually fade with time. Open surgery may be done if problems are found during laparoscopic surgery. With open surgery, the gallbladder is removed through one larger cut in your belly. And the hospital stay is longer. What can you expect after surgery? It is normal to feel weak and tired for several days after you return home. Your belly may be swollen. If you had laparoscopic surgery, you may also have pain in your shoulder for about 24 hours. You may have gas or need to burp a lot at first. 
A few people get diarrhea. The diarrhea usually goes away in 2 to 4 weeks. But it may last longer. How quickly you get better depends on which kind of surgery you had. For laparoscopic surgery, most people can go back to work or their normal routine in 1 to 2 weeks. It depends on the type of work you do and how you feel. If you have open surgery, it will probably take 4 to 6 weeks before you get back to your normal routine. Follow-up care is a key part of your treatment and safety.  Be sure to make and go to all appointments, and call your doctor if you are having problems. It's also a good idea to know your test results and keep a list of the medicines you take. Where can you learn more? Go to http://adlee-gardenia.info/. Enter D995 in the search box to learn more about \"Learning About Cholecystectomy. \" Current as of: August 9, 2016 Content Version: 11.3 © 8685-8761 [employer]. Care instructions adapted under license by [employer] (which disclaims liability or warranty for this information). If you have questions about a medical condition or this instruction, always ask your healthcare professional. [place] [address] any warranty or liability for your use of this information.

## 2017-09-28 NOTE — PROGRESS NOTES
Pt arrived from unit from ED via stretcher. Took vitals   Visit Vitals    /70 (BP 1 Location: Left arm, BP Patient Position: At rest)    Pulse (!) 104    Temp 99.7 °F (37.6 °C)    Resp (!) 32    Ht 5' 3\" (1.6 m)    Wt 64.4 kg (142 lb)    SpO2 (!) 87%    BMI 25.15 kg/m2   Put on 2L NC and paged night time hospitalist.     1932: Returned page. Said to order O2 via NC PRN.

## 2017-09-28 NOTE — ED TRIAGE NOTES
Patient comes in complaining of abdominal pain, states that she has nausea when she eats something. Patient has not had any diarrhea today but states that she was having diarrhea about 3 days ago and it was like water.

## 2017-09-28 NOTE — PROGRESS NOTES
completed the initial Spiritual Assessment of the patient and family of patient in bed 7 of the emergency room and offered Pastoral Care support. Patient appears to be in acute pain and says that she hopes that she will not have to stay to long. Patient does not have any Confucianism/cultural needs that will affect patients preferences in health care.  Chaplains will continue to follow and will provide pastoral care on an as needed/requested basis     Chaplain Yosvany Gottlieb   Board Certified [address] [place] [address]   [phone]

## 2017-09-28 NOTE — IP AVS SNAPSHOT
[place] [address] 
[phone] Patient: Caesar Bustillos MRN: [id number] Current Discharge Medication List  
  
CONTINUE these medications which have NOT CHANGED Dose & Instructions Dispensing Information Comments Morning Noon Evening Bedtime  
 esomeprazole 40 mg capsule Commonly known as:  Cheyenne Myers Your last dose was: Your next dose is:    
   
   
 Dose:  40 mg Take 40 mg by mouth two (2) times a day. Refills:  0  
     
   
   
   
  
 multivitamin with iron tablet Your last dose was: Your next dose is:    
   
   
 Dose:  1 Tab Take 1 Tab by mouth daily. Refills:  0

## 2017-09-28 NOTE — H&P
History and Physical    Patient: Young Cifuentes               Sex: female          DOA: 9/28/2017       YOB: 1940      Age:  68 y.o. Assessment/Plan     Intractable Abd pain - etiology unclear. No murphys sign this does not appear gall bladder related. Lipase wnl.  -CT abd -p, possible infection   -IV morphine prn  N/V/D - supportive care. Etiology unclear  Transamnitis - etiology unclear. Possible viral infection. Hepatitis panel x1  Distended GB with billiary ductal dilitation - GI consulted  Hx of GERD - PPI    Code status: Full  PPX:  DVT: LMWH   GI: home ppi    HPI:     Chief Complaint   Patient presents with    Abdominal Pain       Young Cifuentes is a 68 y.o. female with PMHX of gallstones who presents with 5 days of worsening abdominal pain with nasuea, vomitting, and some diarrhea. No report of rectal bleeding when I talked with patient and family. She has a history of some gallstones. Her pain is mid abdomen to mid R abdomen and radiates posteriorly. She does not guard and is not exquisitely tender. Bowel sounds hyper. Pain is chronic and does not come in waves. She has never had this kind of pain before. ROS + for chills. No fevers. On exam patient is in visible pain and in contorted position on back to help. RUQ US in ER with non-specific GB findings. CMP with transamnitis    Past Medical History:   Diagnosis Date    Abnormal LFTs     Acute neck sprain     Bursitis     right shoulder    Cervicalgia     Chest pain     CKD (chronic kidney disease), stage III     Dyslipidemia     Dyspnea     PATRICIA (generalized anxiety disorder)     Gallstones     Gastritis     Gastritis with bleeding     GERD (gastroesophageal reflux disease)     Hordeolum     Hyperlipidemia     Hypertension     Incontinence     Insomnia     OAB (overactive bladder)     Pruritus        Prior to Admission Medications   Prescriptions Last Dose Informant Patient Reported? Taking? esomeprazole (NEXIUM) 40 mg capsule 9/28/2017  Yes Yes   Sig: Take 40 mg by mouth two (2) times a day. multivitamin with iron tablet 9/28/2017  Yes Yes   Sig: Take 1 Tab by mouth daily. Facility-Administered Medications: None       Social History:  Social History     Social History    Marital status:      Spouse name: N/A    Number of children: N/A    Years of education: N/A     Occupational History    Not on file. Social History Main Topics    Smoking status: Former Smoker     Quit date: 1/1/1980    Smokeless tobacco: Never Used    Alcohol use Yes      Comment: occassionally    Drug use: No    Sexual activity: Not on file     Other Topics Concern    Not on file     Social History Narrative       Family History:  Family History   Problem Relation Age of Onset    No Known Problems Brother        Surgical History:  Past Surgical History:   Procedure Laterality Date    APPENDECTOMY      HX APPENDECTOMY      HX COLONOSCOPY      HX ENDOSCOPY      HX HYSTERECTOMY         Review of Systems  Constitutional:  No fever or weight loss + chills. HEENT:  No headache or visual changes  Cardiovascular:  No chest pain or diaphoresis  Respiratory:  No coughing, wheezing, or shortness of breath. GI:  HPI  :  No hematuria or dysuria  Skin:  No rashes or moles  Neuro:  No seizures or syncope  Hematological:  No bruising or bleeding  Endocrine:  No diabetes or thyroid disease    Physical Exam:      Visit Vitals    /55    Pulse (!) 105    Temp 98.2 °F (36.8 °C)    Resp (!) 34    Ht 5' 3\" (1.6 m)    Wt 64.4 kg (142 lb)    SpO2 99%    BMI 25.15 kg/m2       Physical Exam:  Gen:  In painfull distress, alert  HEENT:  Normal cephalic atraumatic, extra-occular movements are intact.   Neck:  Supple, No JVD  Lungs:  Clear bilaterally, no wheeze, no rales, normal effort  Heart:  Regular Rate and Rhythm, DANDRE, no edema  Abdomen:  Soft, mildly tender mid abd with slight distension, bowels hyperactive, no guarding. Extremities:  Well perfused, no cyanosis or edema  Neurological:  Awake and alert, CN's are intact, normal strength throughout extremities  Skin:  No rashes or moles  Psych:  Normal thought process    Laboratory Studies: All lab results for the last 24 hours reviewed. Recent Results (from the past 12 hour(s))   CBC WITH AUTOMATED DIFF    Collection Time: 09/28/17  2:32 PM   Result Value Ref Range    WBC 10.7 4.6 - 13.2 K/uL    RBC 3.95 (L) 4.20 - 5.30 M/uL    HGB 13.1 12.0 - 16.0 g/dL    HCT 38.3 35.0 - 45.0 %    MCV 97.0 74.0 - 97.0 FL    MCH 33.2 24.0 - 34.0 PG    MCHC 34.2 31.0 - 37.0 g/dL    RDW 13.3 11.6 - 14.5 %    PLATELET 019 383 - 950 K/uL    MPV 9.3 9.2 - 11.8 FL    NEUTROPHILS 72 40 - 73 %    LYMPHOCYTES 11 (L) 21 - 52 %    MONOCYTES 13 (H) 3 - 10 %    EOSINOPHILS 4 0 - 5 %    BASOPHILS 0 0 - 2 %    ABS. NEUTROPHILS 7.8 1.8 - 8.0 K/UL    ABS. LYMPHOCYTES 1.1 0.9 - 3.6 K/UL    ABS. MONOCYTES 1.4 (H) 0.05 - 1.2 K/UL    ABS. EOSINOPHILS 0.4 0.0 - 0.4 K/UL    ABS. BASOPHILS 0.0 0.0 - 0.06 K/UL    DF AUTOMATED     HEPATIC FUNCTION PANEL    Collection Time: 09/28/17  2:32 PM   Result Value Ref Range    Protein, total 6.7 6.4 - 8.2 g/dL    Albumin 3.2 (L) 3.4 - 5.0 g/dL    Globulin 3.5 2.0 - 4.0 g/dL    A-G Ratio 0.9 0.8 - 1.7      Bilirubin, total 2.2 (H) 0.2 - 1.0 MG/DL    Bilirubin, direct 1.7 (H) 0.0 - 0.2 MG/DL    Alk.  phosphatase 324 (H) 45 - 117 U/L    AST (SGOT) 100 (H) 15 - 37 U/L    ALT (SGPT) 304 (H) 13 - 56 U/L   LIPASE    Collection Time: 09/28/17  2:32 PM   Result Value Ref Range    Lipase 213 73 - 582 U/L   METABOLIC PANEL, BASIC    Collection Time: 09/28/17  2:32 PM   Result Value Ref Range    Sodium 139 136 - 145 mmol/L    Potassium 3.5 3.5 - 5.5 mmol/L    Chloride 107 100 - 108 mmol/L    CO2 24 21 - 32 mmol/L    Anion gap 8 3.0 - 18 mmol/L    Glucose 92 74 - 99 mg/dL    BUN 13 7.0 - 18 MG/DL    Creatinine 0.85 0.6 - 1.3 MG/DL    BUN/Creatinine ratio 15 12 - 20 GFR est AA >60 >60 ml/min/1.73m2    GFR est non-AA >60 >60 ml/min/1.73m2    Calcium 8.3 (L) 8.5 - 10.1 MG/DL   CARDIAC PANEL,(CK, CKMB & TROPONIN)    Collection Time: 09/28/17  2:32 PM   Result Value Ref Range    CK 49 26 - 192 U/L    CK - MB <1.0 <3.6 ng/ml    CK-MB Index  0.0 - 4.0 %     CALCULATION NOT PERFORMED WHEN RESULT IS BELOW LINEAR LIMIT    Troponin-I, Qt. <0.02 0.0 - 0.045 NG/ML   EKG, 12 LEAD, INITIAL    Collection Time: 09/28/17  2:40 PM   Result Value Ref Range    Ventricular Rate 80 BPM    Atrial Rate 80 BPM    P-R Interval 178 ms    QRS Duration 72 ms    Q-T Interval 406 ms    QTC Calculation (Bezet) 468 ms    Calculated P Axis 51 degrees    Calculated R Axis 5 degrees    Calculated T Axis 29 degrees    Diagnosis       Normal sinus rhythm  Inferior infarct , age undetermined  Abnormal ECG  When compared with ECG of 07-FEB-2017 12:29,  No significant change was found     URINALYSIS W/ RFLX MICROSCOPIC    Collection Time: 09/28/17  4:00 PM   Result Value Ref Range    Color DARK YELLOW      Appearance CLEAR      Specific gravity 1.017 1.005 - 1.030      pH (UA) 6.0 5.0 - 8.0      Protein NEGATIVE  NEG mg/dL    Glucose NEGATIVE  NEG mg/dL    Ketone TRACE (A) NEG mg/dL    Bilirubin MODERATE (A) NEG      Blood SMALL (A) NEG      Urobilinogen 4.0 (H) 0.2 - 1.0 EU/dL    Nitrites NEGATIVE  NEG      Leukocyte Esterase TRACE (A) NEG     URINE MICROSCOPIC ONLY    Collection Time: 09/28/17  4:00 PM   Result Value Ref Range    WBC 0 to 3 0 - 4 /hpf    RBC 4 to 10 0 - 5 /hpf    Epithelial cells 1+ 0 - 5 /lpf    Bacteria FEW (A) NEG /hpf    Mucus FEW (A) NEG /lpf       Rad: 9/28  pCXR   THERESA US  CT ABd/pelv

## 2017-09-29 ENCOUNTER — ANESTHESIA (OUTPATIENT)
Dept: ENDOSCOPY | Age: 77
DRG: 419 | End: 2017-09-29
Payer: MEDICARE

## 2017-09-29 ENCOUNTER — APPOINTMENT (OUTPATIENT)
Dept: GENERAL RADIOLOGY | Age: 77
DRG: 419 | End: 2017-09-29
Attending: INTERNAL MEDICINE
Payer: MEDICARE

## 2017-09-29 ENCOUNTER — ANESTHESIA EVENT (OUTPATIENT)
Dept: ENDOSCOPY | Age: 77
DRG: 419 | End: 2017-09-29
Payer: MEDICARE

## 2017-09-29 LAB
ALBUMIN SERPL-MCNC: 2.9 G/DL (ref 3.4–5)
ALBUMIN/GLOB SERPL: 1 {RATIO} (ref 0.8–1.7)
ALP SERPL-CCNC: 303 U/L (ref 45–117)
ALT SERPL-CCNC: 243 U/L (ref 13–56)
ANION GAP SERPL CALC-SCNC: 8 MMOL/L (ref 3–18)
AST SERPL-CCNC: 86 U/L (ref 15–37)
ATRIAL RATE: 80 BPM
BILIRUB SERPL-MCNC: 2.9 MG/DL (ref 0.2–1)
BUN SERPL-MCNC: 13 MG/DL (ref 7–18)
BUN/CREAT SERPL: 15 (ref 12–20)
CALCIUM SERPL-MCNC: 8.1 MG/DL (ref 8.5–10.1)
CALCULATED P AXIS, ECG09: 51 DEGREES
CALCULATED R AXIS, ECG10: 5 DEGREES
CALCULATED T AXIS, ECG11: 29 DEGREES
CHLORIDE SERPL-SCNC: 107 MMOL/L (ref 100–108)
CO2 SERPL-SCNC: 25 MMOL/L (ref 21–32)
CREAT SERPL-MCNC: 0.85 MG/DL (ref 0.6–1.3)
DIAGNOSIS, 93000: NORMAL
ERYTHROCYTE [DISTWIDTH] IN BLOOD BY AUTOMATED COUNT: 13.7 % (ref 11.6–14.5)
GLOBULIN SER CALC-MCNC: 3 G/DL (ref 2–4)
GLUCOSE SERPL-MCNC: 85 MG/DL (ref 74–99)
HAV IGM SER QL: NEGATIVE
HBV CORE IGM SER QL: NEGATIVE
HBV SURFACE AG SER QL: 0.25 INDEX
HBV SURFACE AG SER QL: NEGATIVE
HCT VFR BLD AUTO: 36.4 % (ref 35–45)
HCV AB SER IA-ACNC: 0.28 INDEX
HCV AB SERPL QL IA: NEGATIVE
HCV COMMENT,HCGAC: NORMAL
HGB BLD-MCNC: 11.9 G/DL (ref 12–16)
MCH RBC QN AUTO: 32.5 PG (ref 24–34)
MCHC RBC AUTO-ENTMCNC: 32.7 G/DL (ref 31–37)
MCV RBC AUTO: 99.5 FL (ref 74–97)
P-R INTERVAL, ECG05: 178 MS
PLATELET # BLD AUTO: 257 K/UL (ref 135–420)
PMV BLD AUTO: 9.7 FL (ref 9.2–11.8)
POTASSIUM SERPL-SCNC: 3.4 MMOL/L (ref 3.5–5.5)
PROT SERPL-MCNC: 5.9 G/DL (ref 6.4–8.2)
Q-T INTERVAL, ECG07: 406 MS
QRS DURATION, ECG06: 72 MS
QTC CALCULATION (BEZET), ECG08: 468 MS
RBC # BLD AUTO: 3.66 M/UL (ref 4.2–5.3)
SODIUM SERPL-SCNC: 140 MMOL/L (ref 136–145)
SP1: NORMAL
SP2: NORMAL
SP3: NORMAL
VENTRICULAR RATE, ECG03: 80 BPM
WBC # BLD AUTO: 10.9 K/UL (ref 4.6–13.2)

## 2017-09-29 PROCEDURE — 77030006565 HC BG DRN BILE BARD -A: Performed by: INTERNAL MEDICINE

## 2017-09-29 PROCEDURE — 87040 BLOOD CULTURE FOR BACTERIA: CPT | Performed by: INTERNAL MEDICINE

## 2017-09-29 PROCEDURE — 76040000008: Performed by: INTERNAL MEDICINE

## 2017-09-29 PROCEDURE — 80053 COMPREHEN METABOLIC PANEL: CPT | Performed by: INTERNAL MEDICINE

## 2017-09-29 PROCEDURE — 77030007288 HC DEV LOK BILI BSC -A: Performed by: INTERNAL MEDICINE

## 2017-09-29 PROCEDURE — 74011250636 HC RX REV CODE- 250/636

## 2017-09-29 PROCEDURE — 77030031670 HC DEV INFL ENDOTEK BIG60 MRTM -B: Performed by: INTERNAL MEDICINE

## 2017-09-29 PROCEDURE — 77030013333: Performed by: INTERNAL MEDICINE

## 2017-09-29 PROCEDURE — 74011250636 HC RX REV CODE- 250/636: Performed by: INTERNAL MEDICINE

## 2017-09-29 PROCEDURE — 36415 COLL VENOUS BLD VENIPUNCTURE: CPT | Performed by: INTERNAL MEDICINE

## 2017-09-29 PROCEDURE — 85027 COMPLETE CBC AUTOMATED: CPT | Performed by: INTERNAL MEDICINE

## 2017-09-29 PROCEDURE — 77010033678 HC OXYGEN DAILY

## 2017-09-29 PROCEDURE — 76060000033 HC ANESTHESIA 1 TO 1.5 HR: Performed by: INTERNAL MEDICINE

## 2017-09-29 PROCEDURE — 74011250637 HC RX REV CODE- 250/637: Performed by: INTERNAL MEDICINE

## 2017-09-29 PROCEDURE — 74011000250 HC RX REV CODE- 250

## 2017-09-29 PROCEDURE — 74011636320 HC RX REV CODE- 636/320: Performed by: INTERNAL MEDICINE

## 2017-09-29 PROCEDURE — 0FC98ZZ EXTIRPATION OF MATTER FROM COMMON BILE DUCT, VIA NATURAL OR ARTIFICIAL OPENING ENDOSCOPIC: ICD-10-PCS | Performed by: INTERNAL MEDICINE

## 2017-09-29 PROCEDURE — 74011000258 HC RX REV CODE- 258: Performed by: INTERNAL MEDICINE

## 2017-09-29 PROCEDURE — 77030020263 HC SOL INJ SOD CL0.9% LFCR 1000ML

## 2017-09-29 PROCEDURE — 77030012595 HC SPHNTOM BILI BSC -D: Performed by: INTERNAL MEDICINE

## 2017-09-29 PROCEDURE — 77030011640 HC PAD GRND REM COVD -A: Performed by: INTERNAL MEDICINE

## 2017-09-29 PROCEDURE — 77030009038 HC CATH BILI STN RTVR BSC -C: Performed by: INTERNAL MEDICINE

## 2017-09-29 PROCEDURE — 65270000029 HC RM PRIVATE

## 2017-09-29 PROCEDURE — 74330 X-RAY BILE/PANC ENDOSCOPY: CPT

## 2017-09-29 RX ORDER — SODIUM CHLORIDE 0.9 % (FLUSH) 0.9 %
5-10 SYRINGE (ML) INJECTION EVERY 8 HOURS
Status: CANCELLED | OUTPATIENT
Start: 2017-09-29 | End: 2017-09-29

## 2017-09-29 RX ORDER — EPINEPHRINE 0.1 MG/ML
INJECTION INTRACARDIAC; INTRAVENOUS
Status: DISPENSED
Start: 2017-09-29 | End: 2017-09-30

## 2017-09-29 RX ORDER — DEXTROSE 50 % IN WATER (D50W) INTRAVENOUS SYRINGE
25-50 AS NEEDED
Status: DISCONTINUED | OUTPATIENT
Start: 2017-09-29 | End: 2017-09-30

## 2017-09-29 RX ORDER — ALBUTEROL SULFATE 0.83 MG/ML
2.5 SOLUTION RESPIRATORY (INHALATION)
Status: DISCONTINUED | OUTPATIENT
Start: 2017-09-29 | End: 2017-09-30

## 2017-09-29 RX ORDER — FENTANYL CITRATE 50 UG/ML
25 INJECTION, SOLUTION INTRAMUSCULAR; INTRAVENOUS AS NEEDED
Status: DISCONTINUED | OUTPATIENT
Start: 2017-09-29 | End: 2017-09-30

## 2017-09-29 RX ORDER — DEXTROMETHORPHAN/PSEUDOEPHED 2.5-7.5/.8
1.2 DROPS ORAL
Status: CANCELLED | OUTPATIENT
Start: 2017-09-29

## 2017-09-29 RX ORDER — MAGNESIUM SULFATE 100 %
4 CRYSTALS MISCELLANEOUS AS NEEDED
Status: DISCONTINUED | OUTPATIENT
Start: 2017-09-29 | End: 2017-09-30

## 2017-09-29 RX ORDER — KETAMINE HYDROCHLORIDE 10 MG/ML
INJECTION, SOLUTION INTRAMUSCULAR; INTRAVENOUS AS NEEDED
Status: DISCONTINUED | OUTPATIENT
Start: 2017-09-29 | End: 2017-09-29 | Stop reason: HOSPADM

## 2017-09-29 RX ORDER — PROPOFOL 10 MG/ML
INJECTION, EMULSION INTRAVENOUS
Status: DISCONTINUED | OUTPATIENT
Start: 2017-09-29 | End: 2017-09-29 | Stop reason: HOSPADM

## 2017-09-29 RX ORDER — SODIUM CHLORIDE 0.9 % (FLUSH) 0.9 %
5-10 SYRINGE (ML) INJECTION AS NEEDED
Status: CANCELLED | OUTPATIENT
Start: 2017-09-29 | End: 2017-09-29

## 2017-09-29 RX ORDER — MIDAZOLAM HYDROCHLORIDE 1 MG/ML
INJECTION, SOLUTION INTRAMUSCULAR; INTRAVENOUS AS NEEDED
Status: DISCONTINUED | OUTPATIENT
Start: 2017-09-29 | End: 2017-09-29 | Stop reason: HOSPADM

## 2017-09-29 RX ADMIN — ENOXAPARIN SODIUM 40 MG: 40 INJECTION SUBCUTANEOUS at 18:41

## 2017-09-29 RX ADMIN — MIDAZOLAM HYDROCHLORIDE 1 MG: 1 INJECTION, SOLUTION INTRAMUSCULAR; INTRAVENOUS at 15:03

## 2017-09-29 RX ADMIN — SODIUM CHLORIDE 3.38 G: 900 INJECTION INTRAVENOUS at 18:45

## 2017-09-29 RX ADMIN — SODIUM CHLORIDE 125 ML/HR: 900 INJECTION, SOLUTION INTRAVENOUS at 03:40

## 2017-09-29 RX ADMIN — PROPOFOL 100 MCG/KG/MIN: 10 INJECTION, EMULSION INTRAVENOUS at 15:03

## 2017-09-29 RX ADMIN — SODIUM CHLORIDE 3.38 G: 900 INJECTION INTRAVENOUS at 14:00

## 2017-09-29 RX ADMIN — KETAMINE HYDROCHLORIDE 30 MG: 10 INJECTION, SOLUTION INTRAMUSCULAR; INTRAVENOUS at 15:25

## 2017-09-29 RX ADMIN — SODIUM CHLORIDE 125 ML/HR: 900 INJECTION, SOLUTION INTRAVENOUS at 12:08

## 2017-09-29 RX ADMIN — KETAMINE HYDROCHLORIDE 15 MG: 10 INJECTION, SOLUTION INTRAMUSCULAR; INTRAVENOUS at 15:42

## 2017-09-29 RX ADMIN — MIDAZOLAM HYDROCHLORIDE 1 MG: 1 INJECTION, SOLUTION INTRAMUSCULAR; INTRAVENOUS at 15:00

## 2017-09-29 RX ADMIN — SODIUM CHLORIDE: 900 INJECTION, SOLUTION INTRAVENOUS at 14:45

## 2017-09-29 RX ADMIN — PANTOPRAZOLE SODIUM 40 MG: 40 TABLET, DELAYED RELEASE ORAL at 08:20

## 2017-09-29 RX ADMIN — MORPHINE SULFATE 5 MG: 10 INJECTION INTRAMUSCULAR; INTRAVENOUS; SUBCUTANEOUS at 19:35

## 2017-09-29 NOTE — PROGRESS NOTES
Assumed care of patient. Patient is AOx4, resting in bed, in stable condition. Patient denies having any pain or discomfort.

## 2017-09-29 NOTE — ACP (ADVANCE CARE PLANNING)
Patient has designated her daughter to participate in his/her discharge plan and to receive any needed information.      Name: Taylor Lópezisael  Address:  Phone number: [phone]

## 2017-09-29 NOTE — PROGRESS NOTES
Attempted initial interview but is in the process of going to [facility] for procedure. Will come back later.     [address] [facility] [address] for [employer] [facility] [employer]  [phone]

## 2017-09-29 NOTE — PROCEDURES
ERCP Procedure Note    Patient: Sabina Armstrong MRN: [id number]  SSN: [id number]    YOB: 1940  Age: 68 y.o. Sex: female      Date/Time:  9/29/2017 4:06 PM    Procedure: ERCP with Stone Extraction  balloon with  complete clearance    Indication: Suspected cholangitis  :  Negro Conde MD  Referring Provider:   Anita Mcgee MD  History: The history and physical exam were reviewed and updated. Endoscope: [facility] (ERCP) Duodenoscope #[id number]  Extent of Exam: Second portion of the duodenum  ASA: ASA 2 - Patient with mild systemic disease with no functional limitations  Anethesia/Sedation: Monitored Anesthesia Care (MAC)    Description of the procedure: The procedure was discussed with the patient including risks, benefits, alternatives including risks of iv sedation, bleeding, perforation, aspiration and pancreatitis. A safety timeout was performed. The patient was placed in the swimmer's position. A bite block was placed. The patient was given sedation via monitored anesthesia care. The patients vital signs were monitored at all times including heart rate/rhythm, blood pressure and oxygen saturation. The endoscope was then passed under direct visualization to the second portion of the duodenum. The ampulla was identified. The common bile duct was cannulated using a 40 Jagtome with a .035 guidewire. A cholangiogram was performed using 50% conray. A sphincterotomy was performed using ERBE at standard settings. No immediate complications were noted. A 12mm extraction balloon was then dragged several times through the common bile duct and passed without difficulty through the sphincterotomy. A completion, balloon cholangiogram was performed. The endoscope was then slowly withdrawn while visualizing the mucosa. In the stomach a retroflexion was performed and gastric fundus and cardia visualized. The endoscope was then slowly withdrawn.   The patient was then transferred to recovery in stable condition. Findings:    Esophagus: The esophageal mucosa was normal with no ulceration, mass or   stricture. There was no evidence of Crespo's esophagus or reflux esophagitis. Stomach: The gastric mucosa was normal with no ulceration, mass, stricture. Duodenum: The duodenum mucosa was normal with no ulceration, mass,   stricture and no evidence of villous atrophy. Ampulla: wnl   Cholangiogram: mildly dilated CBD   Pancreatogram: not done    Therapies:  Stone Extraction  balloon with  complete clearance    Specimens: * No specimens in log *            Complications:   None; patient tolerated the procedure well. EBL:None    IMPRESSION:   1. Multiple small stones removed from CBD      RECOMMENDATIONS:  1. Monitor LFTs  2. Continue antibiotics  3.  Surgery consult for Lap Mireille    Discharge disposition:  Home in the company of  when able to ambulate    Eric Esqueda MD, CECILE Barbour  September 29, 2017  4:06 PM

## 2017-09-29 NOTE — PROGRESS NOTES
Daily Progress Note: 9/29/2017 10:42 AM   Admit Date: 9/28/2017    Patient seen in follow up for multiple medical problems as listed below:  Patient Active Problem List   Diagnosis Code    History of gastrointestinal hemorrhage Z87.19    Incontinence R32    Hypertension I10    Hyperlipidemia E78.5    GERD (gastroesophageal reflux disease) K21.9    PATRICIA (generalized anxiety disorder) F41.1    CKD (chronic kidney disease), stage III N18.3    Choledocholithiasis K80.50    Persistent vomiting R11.10    Elevated LFTs R94.5    Leukocytosis D72.829    Intractable abdominal pain R10.9       Assesment     68 y.o. female with PMHX of PUD fro NSAIDs (per patient), gallstones and diverticula who presents with 5 days of worsening abdominal pain with nasuea, vomitting, and some diarrhea. Her pain is lower central and slightly right abdomen, negative murphys sign. RUQ US and CT abdomen only reveal some non specific gall bladder pathology. Labs reveal a transamnitis. Her pain resided overnight with IV morphine and zofran. GI consulted. Intractable Abd pain - etiology unclear. No murphys sign this does not appear gall bladder related. Lipase wnl.  -CT abd -p, possible infection   -IV morphine prn  N/V/D - supportive care. Etiology unclear  Transamnitis - etiology unclear. Possible viral infection. Hepatitis panel x1  Distended GB with billiary ductal dilitation - GI consulted  Hx of GERD - PPI    DVT Protocol Active: yes  Code Status:  Full Code     Disposition: home 1-2 days    Subjective:     CC: Abdominal Pain    Interval History: pain much improved overnight. CT abd not revealing any pathology to explain pain. Pending GI consult. She did take 650mg tylenol every 4 hours at home for 1-2 weeks for a left arm strain when bowling. Will advance to clears    ROS: 11 point ROS + l arm pain.      Objective:     Visit Vitals    /61 (BP 1 Location: Left arm, BP Patient Position: At rest)    Pulse 80    Temp 98.5 °F (36.9 °C)    Resp 22    Ht 5' 3\" (1.6 m)    Wt 64.4 kg (142 lb)    SpO2 94%    Breastfeeding No    BMI 25.15 kg/m2       Temp (24hrs), Av.9 °F (37.2 °C), Min:98.2 °F (36.8 °C), Max:99.7 °F (37.6 °C)        Intake/Output Summary (Last 24 hours) at 17 1042  Last data filed at 17 0806   Gross per 24 hour   Intake          1345.84 ml   Output              925 ml   Net           420.84 ml       Gen: AOx3, NAD  HEENT:  NEREYDA, EOMI. Neck: No Bruits/JVD   Lungs:   CTAB. Good respiratory effort  Heart:   RR S1 S2 without M/R/G  Abdomen: ND,,ild centeral tenderness, BSX4,   Extremities:   No LE edema. No cyanosis.   Skin:  no jaundice/lesions      Data Review:     Meds/Labs/Tests reviewed    Current Shift:   07 - 1900  In: -   Out: 200 [Urine:200]  Last three shifts:  1901 -  0700  In: 1345.8 [I.V.:1345.8]  Out: 725 [Urine:725]  Recent Labs      17   0406  17   1432   WBC  10.9  10.7   RBC  3.66*  3.95*   HGB  11.9*  13.1   HCT  36.4  38.3   PLT  257  261   GRANS   --   72   LYMPH   --   11*   EOS   --   4       Recent Labs      17   0406  17   1432   BUN  13  13   CREA  0.85  0.85   CA  8.1*  8.3*   ALB  2.9*  3.2*   K  3.4*  3.5   NA  140  139   CL  107  107   CO2  25  24   GLU  85  92        Lab Results   Component Value Date/Time    Glucose 85 2017 04:06 AM    Glucose 92 2017 02:32 PM    Glucose 108 02/10/2017 03:50 AM    Glucose 108 2017 04:01 AM    Glucose 126 2017 09:22 AM          Care coordination with Nursing/Consultants/staff: 15  Prior history, labs, and charting reviewed: 15    Procedures/Imaging:  pCXR   THERESA US  CT ABd/pelv    Total time spent with chart review, patient examination/education, discussion with staff on case,documentation and medication management / adjustment  :  30 Minutes      Dr Chana Venegas  [phone]

## 2017-09-29 NOTE — PROGRESS NOTES
Received awake,alert in no acute distress. Hob elevated. Call light,phone with in reach. Instructed to call for assistance fro safety. Family members visiting. Npo except meds,instructed. 1920 Dual skin assessment with Candi Grover rn. Skin in tact. 9/29/17 0000 Pt has been sleeping. No emesis noted. Kept npo.  0600 Pt has ambulated to the bathroom with steady gait,voided x 2,tolerated activity well. No stool this shift.

## 2017-09-29 NOTE — ANESTHESIA POSTPROCEDURE EVALUATION
Post-Anesthesia Evaluation and Assessment    Patient: Jose Francisco Hernández MRN: [id number]  SSN: [id number]    YOB: 1940  Age: 68 y.o. Sex: female      Data from PACU flowsheet    Cardiovascular Function/Vital Signs  Visit Vitals    /73    Pulse 78    Temp 37.2 °C (99 °F)    Resp 24    Ht 5' 3\" (1.6 m)    Wt 64.4 kg (142 lb)    SpO2 92%    Breastfeeding No    BMI 25.15 kg/m2       Patient is status post MAC anesthesia for Procedure(s):  ENDOSCOPIC RETROGRADE CHOLANGIOPANCREATOGRAPHY. Nausea/Vomiting: controlled    Postoperative hydration reviewed and adequate. Pain:  Pain Scale 1: Numeric (0 - 10) (09/29/17 1615)  Pain Intensity 1: 0 (09/29/17 1615)   Managed      Mental Status and Level of Consciousness: Alert and oriented     Pulmonary Status:   O2 Device: Room air (09/29/17 1619)   Adequate oxygenation and airway patent    Complications related to anesthesia: None    Post-anesthesia assessment completed.  No concerns    Signed By: Hien Hartley MD     September 29, 2017

## 2017-09-29 NOTE — PERIOP NOTES
Rec'd care of pt from Endo after ERCP via stretcher. Slightly drowsy. Attached to monitor. VSS. Procedural and anesthesia report acknowledged. Denies pain or discomforts. Will cont to monitor. TRANSFER - OUT REPORT:    Verbal report given to Luis M Snyder RN (name) on Jameel Paredes  being transferred to 2100 (unit) for routine post - op       Report consisted of patients Situation, Background, Assessment and   Recommendations(SBAR). Information from the following report(s) SBAR, Procedure Summary and Cardiac Rhythm sinus rhythm was reviewed with the receiving nurse. Lines:   Peripheral IV 09/28/17 Left Forearm (Active)   Site Assessment Clean, dry, & intact 9/29/2017  4:15 PM   Phlebitis Assessment 0 9/29/2017  4:15 PM   Infiltration Assessment 0 9/29/2017  4:15 PM   Dressing Status Clean, dry, & intact 9/29/2017  4:15 PM   Dressing Type Transparent;Tape 9/29/2017  4:15 PM   Hub Color/Line Status Green; Infusing 9/29/2017  4:15 PM   Action Taken Open ports on tubing capped 9/29/2017  4:15 PM   Alcohol Cap Used Yes 9/29/2017  4:15 PM        Opportunity for questions and clarification was provided.       Patient transported with:   [employer]

## 2017-09-29 NOTE — CONSULTS
Gastroenterology Consult    Patient: Paulette Gruber MRN: [id number]  SSN: [id number]    YOB: 1940  Age: 68 y.o. Sex: female      Subjective:      Paulette Gruber is a 68y.o. year old  female who is being seen for abdominal pain and elevated LFts. Full HPI dictated. [facility] Problems  Date Reviewed: 2/7/2017          Codes Class Noted POA    * (Principal)Intractable abdominal pain ICD-10-CM: R10.9  ICD-9-CM: 789.00  9/28/2017 Unknown        Hypertension (Chronic) ICD-10-CM: I10  ICD-9-CM: 401.9  Unknown Yes        Hyperlipidemia (Chronic) ICD-10-CM: E78.5  ICD-9-CM: 272.4  Unknown Yes        GERD (gastroesophageal reflux disease) (Chronic) ICD-10-CM: K21.9  ICD-9-CM: 530.81  Unknown Yes            Past Medical History:   Diagnosis Date    Abnormal LFTs     Acute neck sprain     Bursitis     right shoulder    Cervicalgia     Chest pain     CKD (chronic kidney disease), stage III     Dyslipidemia     Dyspnea     PATRICIA (generalized anxiety disorder)     Gallstones     Gastritis     Gastritis with bleeding     GERD (gastroesophageal reflux disease)     Hordeolum     Hyperlipidemia     Hypertension     Incontinence     Insomnia     OAB (overactive bladder)     Pruritus      Past Surgical History:   Procedure Laterality Date    APPENDECTOMY      HX APPENDECTOMY      HX COLONOSCOPY      HX ENDOSCOPY      HX HYSTERECTOMY        Family History   Problem Relation Age of Onset    No Known Problems Brother      Social History   Substance Use Topics    Smoking status: Former Smoker     Quit date: 1/1/1980    Smokeless tobacco: Never Used    Alcohol use Yes      Comment: occassionally      Current Facility-Administered Medications   Medication Dose Route Frequency Provider Last Rate Last Dose    pantoprazole (PROTONIX) tablet 40 mg  40 mg Oral DAILY AFTER BREAKFAST Temitope Tellez DO   40 mg at 09/29/17 0820    0.9% sodium chloride infusion  125 mL/hr IntraVENous CONTINUOUS Damien Regulo,  mL/hr at 09/29/17 0340 125 mL/hr at 09/29/17 0340    morphine injection 5 mg  5 mg IntraVENous Q4H PRN Damien Regulo, DO        ondansetron [facility]) injection 4 mg  4 mg IntraVENous Q4H PRN Damien Bach, DO        enoxaparin (LOVENOX) injection 40 mg  40 mg SubCUTAneous Q24H Damien Regulo, DO   40 mg at 09/28/17 2012        Allergies   Allergen Reactions    Codeine Other (comments)     Chills, Loss of consciousness     Norco [Hydrocodone-Acetaminophen] Nausea Only    Tylox [Oxycodone-Acetaminophen] Nausea Only    Vistaril [Hydroxyzine Pamoate] Vertigo       Review of Systems:  Constitutional: positive for chills, malaise and anorexia  Ears, Nose, Mouth, Throat, and Face: negative  Respiratory: negative  Cardiovascular: negative  Neurological: negative  Behavioral/Psychiatric: negative    Objective:     Blood pressure 122/61, pulse 80, temperature 98.5 °F (36.9 °C), resp. rate 22, height 5' 3\" (1.6 m), weight 64.4 kg (142 lb), SpO2 94 %, not currently breastfeeding. Physical Exam:  GENERAL: alert, cooperative, no distress, appears stated age  THROAT & NECK: normal  LUNG: clear to auscultation bilaterally  HEART: regular rate and rhythm  ABDOMEN: soft, .  Bowel sounds normal. No masses,  no organomegaly,mild tenderness in epigastric area  EXTREMITIES:  no edema  SKIN: Normal.  RECTAL: deferred    Recent Results (from the past 24 hour(s))   CBC WITH AUTOMATED DIFF    Collection Time: 09/28/17  2:32 PM   Result Value Ref Range    WBC 10.7 4.6 - 13.2 K/uL    RBC 3.95 (L) 4.20 - 5.30 M/uL    HGB 13.1 12.0 - 16.0 g/dL    HCT 38.3 35.0 - 45.0 %    MCV 97.0 74.0 - 97.0 FL    MCH 33.2 24.0 - 34.0 PG    MCHC 34.2 31.0 - 37.0 g/dL    RDW 13.3 11.6 - 14.5 %    PLATELET 774 932 - 653 K/uL    MPV 9.3 9.2 - 11.8 FL    NEUTROPHILS 72 40 - 73 %    LYMPHOCYTES 11 (L) 21 - 52 %    MONOCYTES 13 (H) 3 - 10 %    EOSINOPHILS 4 0 - 5 %    BASOPHILS 0 0 - 2 %    ABS. NEUTROPHILS 7.8 1.8 - 8.0 K/UL    ABS. LYMPHOCYTES 1.1 0.9 - 3.6 K/UL    ABS. MONOCYTES 1.4 (H) 0.05 - 1.2 K/UL    ABS. EOSINOPHILS 0.4 0.0 - 0.4 K/UL    ABS. BASOPHILS 0.0 0.0 - 0.06 K/UL    DF AUTOMATED     HEPATIC FUNCTION PANEL    Collection Time: 09/28/17  2:32 PM   Result Value Ref Range    Protein, total 6.7 6.4 - 8.2 g/dL    Albumin 3.2 (L) 3.4 - 5.0 g/dL    Globulin 3.5 2.0 - 4.0 g/dL    A-G Ratio 0.9 0.8 - 1.7      Bilirubin, total 2.2 (H) 0.2 - 1.0 MG/DL    Bilirubin, direct 1.7 (H) 0.0 - 0.2 MG/DL    Alk. phosphatase 324 (H) 45 - 117 U/L    AST (SGOT) 100 (H) 15 - 37 U/L    ALT (SGPT) 304 (H) 13 - 56 U/L   LIPASE    Collection Time: 09/28/17  2:32 PM   Result Value Ref Range    Lipase 213 73 - 557 U/L   METABOLIC PANEL, BASIC    Collection Time: 09/28/17  2:32 PM   Result Value Ref Range    Sodium 139 136 - 145 mmol/L    Potassium 3.5 3.5 - 5.5 mmol/L    Chloride 107 100 - 108 mmol/L    CO2 24 21 - 32 mmol/L    Anion gap 8 3.0 - 18 mmol/L    Glucose 92 74 - 99 mg/dL    BUN 13 7.0 - 18 MG/DL    Creatinine 0.85 0.6 - 1.3 MG/DL    BUN/Creatinine ratio 15 12 - 20      GFR est AA >60 >60 ml/min/1.73m2    GFR est non-AA >60 >60 ml/min/1.73m2    Calcium 8.3 (L) 8.5 - 10.1 MG/DL   CARDIAC PANEL,(CK, CKMB & TROPONIN)    Collection Time: 09/28/17  2:32 PM   Result Value Ref Range    CK 49 26 - 192 U/L    CK - MB <1.0 <3.6 ng/ml    CK-MB Index  0.0 - 4.0 %     CALCULATION NOT PERFORMED WHEN RESULT IS BELOW LINEAR LIMIT    Troponin-I, Qt. <0.02 0.0 - 0.045 NG/ML   HEPATITIS PANEL, ACUTE    Collection Time: 09/28/17  2:32 PM   Result Value Ref Range    Hepatitis A, IgM NEGATIVE  NEG      __        ---------------------------------------------------    Hepatitis B surface Ag 0.25 <1.00 Index    Hep B surface Ag Interp.  NEGATIVE  NEG      __        ---------------------------------------------------    Hepatitis B core, IgM NEGATIVE  NEG      __        --------------------------------------------------- Hepatitis C virus Ab 0.28 <0.80 Index    Hep C  virus Ab Interp.  NEGATIVE  NEG      Hep C  virus Ab comment         EKG, 12 LEAD, INITIAL    Collection Time: 09/28/17  2:40 PM   Result Value Ref Range    Ventricular Rate 80 BPM    Atrial Rate 80 BPM    P-R Interval 178 ms    QRS Duration 72 ms    Q-T Interval 406 ms    QTC Calculation (Bezet) 468 ms    Calculated P Axis 51 degrees    Calculated R Axis 5 degrees    Calculated T Axis 29 degrees    Diagnosis       Normal sinus rhythm  Inferior infarct , age undetermined  Abnormal ECG  When compared with ECG of 07-FEB-2017 12:29,  No significant change was found  Confirmed by Sheldon Shelby ([id number]) on 9/29/2017 8:55:21 AM     URINALYSIS W/ RFLX MICROSCOPIC    Collection Time: 09/28/17  4:00 PM   Result Value Ref Range    Color DARK YELLOW      Appearance CLEAR      Specific gravity 1.017 1.005 - 1.030      pH (UA) 6.0 5.0 - 8.0      Protein NEGATIVE  NEG mg/dL    Glucose NEGATIVE  NEG mg/dL    Ketone TRACE (A) NEG mg/dL    Bilirubin MODERATE (A) NEG      Blood SMALL (A) NEG      Urobilinogen 4.0 (H) 0.2 - 1.0 EU/dL    Nitrites NEGATIVE  NEG      Leukocyte Esterase TRACE (A) NEG     URINE MICROSCOPIC ONLY    Collection Time: 09/28/17  4:00 PM   Result Value Ref Range    WBC 0 to 3 0 - 4 /hpf    RBC 4 to 10 0 - 5 /hpf    Epithelial cells 1+ 0 - 5 /lpf    Bacteria FEW (A) NEG /hpf    Mucus FEW (A) NEG /lpf   LACTIC ACID    Collection Time: 09/28/17  9:00 PM   Result Value Ref Range    Lactic acid 0.7 0.4 - 2.0 MMOL/L   CBC W/O DIFF    Collection Time: 09/29/17  4:06 AM   Result Value Ref Range    WBC 10.9 4.6 - 13.2 K/uL    RBC 3.66 (L) 4.20 - 5.30 M/uL    HGB 11.9 (L) 12.0 - 16.0 g/dL    HCT 36.4 35.0 - 45.0 %    MCV 99.5 (H) 74.0 - 97.0 FL    MCH 32.5 24.0 - 34.0 PG    MCHC 32.7 31.0 - 37.0 g/dL    RDW 13.7 11.6 - 14.5 %    PLATELET 402 892 - 895 K/uL    MPV 9.7 9.2 - 19.2 FL   METABOLIC PANEL, COMPREHENSIVE    Collection Time: 09/29/17  4:06 AM   Result Value Ref Range Sodium 140 136 - 145 mmol/L    Potassium 3.4 (L) 3.5 - 5.5 mmol/L    Chloride 107 100 - 108 mmol/L    CO2 25 21 - 32 mmol/L    Anion gap 8 3.0 - 18 mmol/L    Glucose 85 74 - 99 mg/dL    BUN 13 7.0 - 18 MG/DL    Creatinine 0.85 0.6 - 1.3 MG/DL    BUN/Creatinine ratio 15 12 - 20      GFR est AA >60 >60 ml/min/1.73m2    GFR est non-AA >60 >60 ml/min/1.73m2    Calcium 8.1 (L) 8.5 - 10.1 MG/DL    Bilirubin, total 2.9 (H) 0.2 - 1.0 MG/DL    ALT (SGPT) 243 (H) 13 - 56 U/L    AST (SGOT) 86 (H) 15 - 37 U/L    Alk. phosphatase 303 (H) 45 - 117 U/L    Protein, total 5.9 (L) 6.4 - 8.2 g/dL    Albumin 2.9 (L) 3.4 - 5.0 g/dL    Globulin 3.0 2.0 - 4.0 g/dL    A-G Ratio 1.0 0.8 - 1.7         Ct Abd Pelv W Cont    Result Date: 9/29/2017  EXAM: CT of the abdomen and pelvis INDICATION: 80-year-old patient with epigastric abdominal pain. COMPARISON: Several prior examinations, most recently ultrasound performed 9/28/2017. TECHNIQUE: Axial CT imaging of the abdomen and pelvis was performed without oral and with intravenous contrast. Multiplanar reformats were generated. One or more dose reduction techniques were used on this CT: automated exposure control, adjustment of the mAs and/or kVp according to patient's size, and iterative reconstruction techniques. The specific techniques utilized on this CT exam have been documented in the patient's electronic medical record. FINDINGS: LOWER CHEST: Minor dependent atelectasis is present at each lung base. No focal pneumonic opacity or pleural effusion. Stable cardiac size. No pericardial effusion. LIVER, BILIARY: Hepatic parenchymal enhancement is uniform. Right hepatic dome cyst unchanged. No intrahepatic biliary ductal dilatation. Gallbladder is mildly distended. No pericholecystic fluid or fat stranding. No radiopaque gallstones. Common bile duct diameter of approximately 9 mm, unchanged PANCREAS: Normal, no peripancreatic fat stranding.  SPLEEN: Normal. ADRENALS: Normal. KIDNEYS/URETERS/BLADDER: Renal parenchymal enhancement is symmetric. Lower pole left renal cyst unchanged. Additional smaller subcentimeter hypodensities noted involving each kidney, stable and too small to further characterize, most in keeping with small cysts. PELVIC ORGANS: Uterus is surgically absent. Hypoattenuating left adnexal nodule measures approximately 1.8 x 2.1 cm in size, unchanged, most in keeping with a simple cyst. VASCULATURE: Aortobiiliac atherosclerotic calcification noted without evidence of aneurysmal dilatation. LYMPH NODES: No enlarged lymph nodes. Subcentimeter mesenteric and retroperitoneal lymph nodes are noted. No abdominal or pelvic adenopathy. GASTROINTESTINAL TRACT: There is a small hiatal hernia. Small intestine and large intestine are normal in course and caliber. No bowel obstruction. No free intraperitoneal gas. Diverticulosis is present without evidence to suggest diverticulitis. BONES: No acute or aggressive osseous abnormalities identified. Lower lumbar spondylosis and facet osteoarthrosis is present. OTHER: Tiny fat-containing umbilical hernia. _______________     IMPRESSION: 1. Nonspecific gallbladder distention, without radiopaque gallstone. The common bile duct diameter is upper limits of normal for age. No intrahepatic biliary ductal dilatation. 2. Small hiatal hernia, unchanged. 3. Diverticulosis without evidence to suggest diverticulitis. [address] [place] [address]    Result Date: 9/28/2017  Ultrasound Abdomen Limited  History: Epigastric and right upper quadrant pain Comparison: CT of the abdomen and pelvis 2/8/2017, prior ultrasound 2/8/2017, prior MRCP 1/5/2017 Technique: Multiple gray scale sonographic images of the abdomen were obtained with attention to the right upper quadrant. Additional color Doppler and Doppler wave form images were also acquired. Findings: Examination is limited by bowel gas. The liver is normal in size and echotexture. No focal hepatic lesions are evident. There is mild intrahepatic biliary ductal dilatation. The portal vein is patent with hepatopedal blood flow and measures mm in diameter. The gallbladder is distended. No pericholecystic fluid or wall thickening. Layering mixed echogenicity material noted, likely in keeping with biliary sludge. There is no cholelithiasis evident. The common bile duct measures approximately 12 mm in diameter. Sonographic Garvey's sign is absent per the technologist. The pancreas is not well visualized secondary to overlying bowel gas. The right kidney measures 10.4 cm in length. There is no intrarenal mass, hydronephrosis, or nephrolithiasis. Interpolar right renal cyst measures approximately 8 x 7 x 5 mm. The included portion of the inferior vena cava is patent. IMPRESSION: 1. Mild intrahepatic and extra hepatic biliary ductal dilatation. No evidence of common duct stone on the provided images. 2. Mildly distended gallbladder, nonspecific, without pericholecystic fluid or wall thickening. No gallstones. Layering biliary sludge noted. Xr Chest Port    Result Date: 9/28/2017  Chest, single view Indication: Epigastric pain with diarrhea. Comparison: 2/29/2012 Findings:  Portable upright AP view of the chest was obtained. The lungs are underexpanded. Crowding of bronchovascular markings at the lung bases noted. No pneumothorax or pleural effusion. Cardiac size and mediastinal contours are stable. Atherosclerotic calcification of the thoracic aorta noted. Impression: Underexpanded lungs without superimposed acute radiographic abnormality. Assessment:   1. Abd pain, chills, elevated LFTs, Hx of CBD stones-suspect cholangitis    Plan:   1. Culture and begin antibiotics (Dr. Staley to initiate)  2.  ERCP today      Signed By: Candi Avila MD, Lynnette Newberry, [facility]UL[facility]    September 29, 2017

## 2017-09-29 NOTE — ROUTINE PROCESS
TRANSFER - IN REPORT:    Verbal report received from [address] [place] [address] RN(name) on Arva Apgar  being received from PACU(unit) for routine progression of care      Report consisted of patients Situation, Background, Assessment and   Recommendations(SBAR). Information from the following report(s) SBAR, Kardex, [facility] and Recent Results was reviewed with the receiving nurse. Opportunity for questions and clarification was provided. Assessment completed upon patients arrival to unit and care assumed.

## 2017-09-29 NOTE — CONSULTS
[facility]    Name:  Hunter Simmons  MR#:  [id number]  :  1940  Account #:  [de-identified]  Date of Adm:  2017  Date of Consultation:  2017      LOCATION: Room 2118. REASON FOR CONSULTATION: Abdominal pain and increased  LFTs. HISTORY OF PRESENT ILLNESS: The patient is a 59-year-old white  female who presented to the emergency room yesterday with an  approximately 1 week history of anorexia, nausea, vomiting, upper  abdominal pain. Upon evaluation, she was noted to have elevated  LFTs with total bilirubin 2.2, direct 1.7, , , and alkaline  phosphatase 324. Lipase was normal at 213. She did undergo right  upper quadrant ultrasound and CT scan of the abdomen and pelvis. Pertinent findings include gallbladder distention, gallbladder sludge,  mild intra- and extrahepatic biliary dilatation. The patient does have a  past history of gallstones, as well as choledocholithiasis. She did  undergo ERCP with sphincterotomy and stone extraction in 2017. I  am now asked to see the patient for further evaluation.         MD Richard Lara / Nubia Vallejo  D:  2017   11:52  T:  2017   12:14  Job #:  [id number]

## 2017-09-29 NOTE — ANESTHESIA PREPROCEDURE EVALUATION
Anesthetic History   No history of anesthetic complications            Review of Systems / Medical History  Patient summary reviewed and pertinent labs reviewed    Pulmonary  Within defined limits                 Neuro/Psych   Within defined limits           Cardiovascular    Hypertension: well controlled              Exercise tolerance: >4 METS     GI/Hepatic/Renal     GERD: well controlled           Endo/Other  Within defined limits           Other Findings   Comments:   Risk Factors for Postoperative nausea/vomiting:       History of postoperative nausea/vomiting? NO       Female? YES       Motion sickness? NO       Intended opioid administration for postoperative analgesia? YES      Smoking Abstinence  Current Smoker? NO  Elective Surgery? YES  Seen preoperatively by anesthesiologist or proxy prior to day of surgery? YES  Pt abstained from smoking 24 hours prior to anesthesia?  N/A           Physical Exam    Airway  Mallampati: II  TM Distance: 4 - 6 cm  Neck ROM: normal range of motion   Mouth opening: Normal     Cardiovascular  Regular rate and rhythm,  S1 and S2 normal,  no murmur, click, rub, or gallop  Rhythm: regular  Rate: normal         Dental  No notable dental hx       Pulmonary  Breath sounds clear to auscultation               Abdominal  GI exam deferred       Other Findings            Anesthetic Plan    ASA: 2  Anesthesia type: general and MAC          Induction: Intravenous  Anesthetic plan and risks discussed with: Patient

## 2017-09-29 NOTE — ROUTINE PROCESS
Bedside and Verbal shift change report given to Maximino Rush) by stacey hummelrn (offgoing nurse). Report given with SBAR, Kardex, Intake/Output and Recent Results.

## 2017-09-29 NOTE — ROUTINE PROCESS
Bedside and Verbal shift change report given to Kashmir RN (oncoming nurse) by Sharon Claudio RN   (offgoing nurse). Report included the following information SBAR, Kardex, MAR and Recent Results.

## 2017-09-29 NOTE — PROGRESS NOTES
Problem: Falls - Risk of  Goal: *Absence of Falls  Document Zeinab Fall Risk and appropriate interventions in the flowsheet.    Outcome: Progressing Towards Goal  Fall Risk Interventions:              Medication Interventions: Patient to call before getting OOB

## 2017-09-29 NOTE — PROGRESS NOTES
[facility]   Discharge Planning/ Assessment    Reasons for Intervention: Was able to interview patient prior to procedure. Face sheet information verified that all information is correct. NOK/DC Care-giver : Lacy Malcolm (dtr) (c) [phone]. Insurance: Medicare/. PCP: Dean Dave NP at [facility]. Pharmacy used: [facility](mail) and [facility] for new meds. Made pt aware of the Bedside Rx service we provide for DC prescriptions. Pt lives alone and is independent with ambulation/ADL's. Pt works as a mark and drives . Has supportive daughter/family in the area. DME: None.  PLAN : Home     High Risk Criteria  [] Yes  [x]No   Physician Referral  [] Yes  [x]No        Date    Nursing Referral  [] Yes  [x]No        Date    Patient/Family Request  [] Yes  [x]No        Date       Resources:    Medicare  [x] Yes  []No   Medicaid  [] Yes  [x]No   No Resources  [] Yes  [x]No   Private Insurance  [x] Yes  []No    Name/Phone Number    Other  [] Yes  [x]No        (i.e. Workman's Comp)         Prior Services:    Prior Services  [] Yes  [x]No   Home Health  [] Yes  [x]No   [address] [place]  [] Yes  [x]No        [facility] of [address] Program  [] Yes  [x]No       Meals on Wheels  [] Yes  [x]No   Office on Aging  [] Yes  [x]No   Transportation Services  [] Yes  [x]No   Nursing Home  [] Yes  [x]No        Nursing Home Name    [address]  [] Yes  [x]No        [address] Name    Other       Information Source:      Information obtained from  [x] Patient  [] Parent   [] [address]  [] Child  [] Spouse   [] Significant Other/Partner   [] Friend      [] EMS    [] Nursing Home Chart          [] Other:   Chart Review  [x] Yes  []No     Family/Support System:    Patient lives with  [x] Alone    [] Spouse   [] Significant Other  [] Children  [] Caretaker   [] Parent  [] Sibling     [] Other       Other Support System:    Is the patient responsible for care of others  [] Yes  [x]No   Information of person caring for patient on  discharge    Managers financial affairs independently  [x] Yes  []No   If no, explain:      Status Prior to Admission:    Mental Status  [x] Awake  [x] Alert  [x] Oriented  [] Quiet/Calm [] Lethargic/Sedated   [] Disoriented  [] Restless/Anxious  [] Combative   Personal Care  [] Dependent  [] [address]  [] Requires Assistance   Meal Preparation Ability  [x] Independent   [] Standby Assistance   [] Minimal Assistance   [] Moderate Assistance  [] Maximum Assistance     [] Total Assistance   Chores  [x] Independent with Chores   [] N/A Nursing Home Resident   [] Requires Assistance   Bowel/Bladder  [x] Continent  [] Catheter  [] Incontinent  [] Ostomy Self-Care    [] Urine Diversion Self-Care  [] Maximum Assistance     [] Total Assistance   Number of Persons needed for assistance    DME at home  [] Angela Fernandez  [] Diandra Fernandez   [] Commode    [] Bathroom/Grab Bars  [] Hospital Bed  [] Nebulizer  [] Oxygen           [] Raised Toilet Seat  [] Shower Chair  [] Side Rails for Bed   [] Tub Transfer Bench   [] Gavin Ripper  [] Cheney Babatunde, Standard      [] Other:   Vendor      Treatment Presently Receiving:    Current Treatments  [] Chemotherapy  [] Dialysis  [] Insulin  [] IVAB [] IVF   [] O2  [] PCA   [] PT   [] RT   [] Tube Feedings   [] Wound Care     Psychosocial Evaluation:    Verbalized Knowledge of Disease Process  [] Patient  []Family   Coping with Disease Process  [] Patient  []Family   Requires Further Counseling Coping with Disease Process  [] Patient  []Family     Identified Projected Needs:    Home Health Aid  [] Yes  [x]No   Transportation  [] Yes  [x]No   Education  [] Yes  [x]No        Specific Education     Financial Counseling  [] Yes  [x]No   Inability to Care for Self/Will Require 24 hour care  [] Yes  [x]No   Pain Management  [] Yes  [x]No   Home Infusion Therapy  [] Yes  [x]No   Oxygen Therapy [] Yes  [x]No   DME  [] Yes  [x]No   Long Term Care Placement  [] Yes  [x]No   Rehab  [] Yes  [x]No   Physical Therapy  [] Yes  [x]No   Needs Anticipated At This Time  [] Yes  [x]No     Intra-Hospital Referral:    [address] [facility]  [] Yes  [x]No     [] Yes  [x]No   Patient Representative  [] Yes  [x]No   Staff for Teaching Needs  [] Yes  [x]No   Specialty Teaching Needs     Diabetic Educator  [] Yes  [x]No   Referral for Diabetic Educator Needed  [] Yes  [x]No  If Yes, place order for Nutritionist or Diabetic Consult     Tentative Discharge Plan:    Home with No Services  [x] Yes  []No   Home with [address]  [] Yes  [x]No        If Yes, specify type    Home Care Program  [] Yes  [x]No        If Yes, specify type    Meals on Wheels  [] Yes  [x]No   Office of Aging  [] Yes  [x]No   NHP  [] Yes  [x]No   Return to the Nursing Home  [] Yes  [x]No   Rehab Therapy  [] Yes  [x]No   Acute Rehab  [] Yes  [x]No   Subacute Rehab  [] Yes  [x]No   Private Care  [] Yes  [x]No   Substance Abuse Referral  [] Yes  [x]No   Transportation  [] Yes  [x]No   Chore Service  [] Yes  [x]No   Inpatient Hospice  [] Yes  [x]No   OP RT  [] Yes  [x] No   OP Hemo  [] Yes  [x] No   OP PT  [] Yes  [x]No   Support Group  [] Yes  [x]No   Reach to Recovery  [] Yes  [x]No   OP Oncology Clinic  [] Yes  [x]No   Clinic Appointment  [] Yes  [x]No   DME  [] Yes  [x]No   Comments    Name of D/C Planner or  Given to Patient or Family Jose Francisco Lo RN   Phone Number         [facility] [phone]   Date 9/29/17   Time    If you are discharged home, whom do you designate to participate in your discharge plan and receive any information needed?      Enter name of designee See ACP        Phone # of designee         Address of designee         Updated         Patient refused to designate any           individual

## 2017-09-30 ENCOUNTER — ANESTHESIA EVENT (OUTPATIENT)
Dept: SURGERY | Age: 77
DRG: 419 | End: 2017-09-30
Payer: MEDICARE

## 2017-09-30 ENCOUNTER — ANESTHESIA (OUTPATIENT)
Dept: SURGERY | Age: 77
DRG: 419 | End: 2017-09-30
Payer: MEDICARE

## 2017-09-30 LAB
ALBUMIN SERPL-MCNC: 2.8 G/DL (ref 3.4–5)
ALBUMIN/GLOB SERPL: 0.9 {RATIO} (ref 0.8–1.7)
ALP SERPL-CCNC: 259 U/L (ref 45–117)
ALT SERPL-CCNC: 165 U/L (ref 13–56)
ANION GAP SERPL CALC-SCNC: 9 MMOL/L (ref 3–18)
AST SERPL-CCNC: 42 U/L (ref 15–37)
BILIRUB SERPL-MCNC: 1.4 MG/DL (ref 0.2–1)
BUN SERPL-MCNC: 13 MG/DL (ref 7–18)
BUN/CREAT SERPL: 17 (ref 12–20)
CALCIUM SERPL-MCNC: 8.1 MG/DL (ref 8.5–10.1)
CHLORIDE SERPL-SCNC: 106 MMOL/L (ref 100–108)
CO2 SERPL-SCNC: 25 MMOL/L (ref 21–32)
CREAT SERPL-MCNC: 0.77 MG/DL (ref 0.6–1.3)
ERYTHROCYTE [DISTWIDTH] IN BLOOD BY AUTOMATED COUNT: 13.4 % (ref 11.6–14.5)
GLOBULIN SER CALC-MCNC: 3 G/DL (ref 2–4)
GLUCOSE SERPL-MCNC: 83 MG/DL (ref 74–99)
HCT VFR BLD AUTO: 33.9 % (ref 35–45)
HGB BLD-MCNC: 11.4 G/DL (ref 12–16)
MCH RBC QN AUTO: 32.9 PG (ref 24–34)
MCHC RBC AUTO-ENTMCNC: 33.6 G/DL (ref 31–37)
MCV RBC AUTO: 97.7 FL (ref 74–97)
PLATELET # BLD AUTO: 251 K/UL (ref 135–420)
PMV BLD AUTO: 9.4 FL (ref 9.2–11.8)
POTASSIUM SERPL-SCNC: 3.2 MMOL/L (ref 3.5–5.5)
PROT SERPL-MCNC: 5.8 G/DL (ref 6.4–8.2)
RBC # BLD AUTO: 3.47 M/UL (ref 4.2–5.3)
SODIUM SERPL-SCNC: 140 MMOL/L (ref 136–145)
WBC # BLD AUTO: 9.3 K/UL (ref 4.6–13.2)

## 2017-09-30 PROCEDURE — 77030020256 HC SOL INJ NACL 0.9%  500ML

## 2017-09-30 PROCEDURE — 74011250636 HC RX REV CODE- 250/636: Performed by: INTERNAL MEDICINE

## 2017-09-30 PROCEDURE — 36415 COLL VENOUS BLD VENIPUNCTURE: CPT | Performed by: INTERNAL MEDICINE

## 2017-09-30 PROCEDURE — 80053 COMPREHEN METABOLIC PANEL: CPT | Performed by: INTERNAL MEDICINE

## 2017-09-30 PROCEDURE — 77030020263 HC SOL INJ SOD CL0.9% LFCR 1000ML

## 2017-09-30 PROCEDURE — 74011250637 HC RX REV CODE- 250/637: Performed by: INTERNAL MEDICINE

## 2017-09-30 PROCEDURE — 74011000258 HC RX REV CODE- 258: Performed by: INTERNAL MEDICINE

## 2017-09-30 PROCEDURE — 85027 COMPLETE CBC AUTOMATED: CPT | Performed by: INTERNAL MEDICINE

## 2017-09-30 PROCEDURE — 65270000029 HC RM PRIVATE

## 2017-09-30 RX ADMIN — SODIUM CHLORIDE 3.38 G: 900 INJECTION INTRAVENOUS at 13:48

## 2017-09-30 RX ADMIN — SODIUM CHLORIDE 125 ML/HR: 900 INJECTION, SOLUTION INTRAVENOUS at 01:55

## 2017-09-30 RX ADMIN — SODIUM CHLORIDE 125 ML/HR: 900 INJECTION, SOLUTION INTRAVENOUS at 23:45

## 2017-09-30 RX ADMIN — PANTOPRAZOLE SODIUM 40 MG: 40 TABLET, DELAYED RELEASE ORAL at 08:58

## 2017-09-30 RX ADMIN — SODIUM CHLORIDE 125 ML/HR: 900 INJECTION, SOLUTION INTRAVENOUS at 23:43

## 2017-09-30 RX ADMIN — SODIUM CHLORIDE 125 ML/HR: 900 INJECTION, SOLUTION INTRAVENOUS at 11:23

## 2017-09-30 RX ADMIN — SODIUM CHLORIDE 3.38 G: 900 INJECTION INTRAVENOUS at 01:55

## 2017-09-30 NOTE — PROGRESS NOTES
Patient received at beginning of shift from Star RN, A&Ox4, in bed, awake. Pt does not appear to be in distress and denies any pain and/or discomfort. Safety measures taken include bed in lowest locked position, call bell within reach, and personal items at bedside within reach. Pt verbalizes understanding of use of call bell and the need to call for assistance to ensure safety. 1945: Bedside and Verbal shift change report given to [facility]  (oncoming nurse) by Tana Baron RN (offgoing nurse). Report included the following information SBAR, Kardex, Intake/Output, MAR and Recent Results.

## 2017-09-30 NOTE — CONSULTS
Surgery Consultation    Subjective:     Ramandeep Flor is a 68 y.o. female, referred by Dr Ela Mott and Dr Staley, with a history of abdominal pain. She had an episode of choledocholithiasis in February which was treated with ERCP and stone extraction. This episode was complicated by post ERCP pancreatitis. She did not see a surgeon for elective cholecystectomy at that time. She continued in her usual state of health, and notes she had intermittent episodes of abdominal pain. She describes her pain as epigastric/RUQ radiating to pelvis and then back. Two days ago, she presented to the ED with a 1 week history of anorexia, nausea and vomiting and chills and was noted to have elevated LFTs again. Films demonstrated some gallbladder distention. GI saw the patient and felt that she was developing cholangitis therefore placed her on IV abx and performed an ERCP with stone extraction yesterday. She is now feeling much better. She wishes to have definitive surgery to avoid further episodes.     Past Medical History:   Diagnosis Date    Abnormal LFTs     Acute neck sprain     Bursitis     right shoulder    Cervicalgia     Chest pain     CKD (chronic kidney disease), stage III     Dyslipidemia     Dyspnea     PATRICIA (generalized anxiety disorder)     Gallstones     Gastritis     Gastritis with bleeding     GERD (gastroesophageal reflux disease)     Hordeolum     Hyperlipidemia     Hypertension     Incontinence     Insomnia     OAB (overactive bladder)     Pruritus         Past Surgical History:   Procedure Laterality Date    APPENDECTOMY      HX APPENDECTOMY      HX COLONOSCOPY      HX ENDOSCOPY      HX HYSTERECTOMY          Current Facility-Administered Medications   Medication Dose Route Frequency Provider Last Rate Last Dose    piperacillin-tazobactam (ZOSYN) 3.375 g in 0.9% sodium chloride (MBP/ADV) 100 mL MBP EXTENDED 4 HOUR INFUSION###  3.375 g IntraVENous Q8H Uri Freitas DO Stopped at 09/30/17 0555    ioversol (OPTIRAY) 320 mg iodine/mL contrast injection    PRN Michelle Clancy MD   10 mL at 09/29/17 1545    pantoprazole (PROTONIX) tablet 40 mg  40 mg Oral DAILY AFTER BREAKFAST Jordan Donath, DO   40 mg at 09/30/17 1930    0.9% sodium chloride infusion  125 mL/hr IntraVENous CONTINUOUS Jordan Donath,  mL/hr at 09/30/17 0155 125 mL/hr at 09/30/17 0155    morphine injection 5 mg  5 mg IntraVENous Q4H PRN Jordan Donath, DO   5 mg at 09/29/17 1935    ondansetron [facility]) injection 4 mg  4 mg IntraVENous Q4H PRN Jordan Donath, DO           Allergies   Allergen Reactions    Codeine Other (comments)     Chills, Loss of consciousness     Norco [Hydrocodone-Acetaminophen] Nausea Only    Tylox [Oxycodone-Acetaminophen] Nausea Only    Vistaril [Hydroxyzine Pamoate] Vertigo        Social History   Substance Use Topics    Smoking status: Former Smoker     Quit date: 1/1/1980    Smokeless tobacco: Never Used    Alcohol use Yes      Comment: occassionally        Family History   Problem Relation Age of Onset    No Known Problems Brother           ROS, positive where in bold:    General: fevers, chills, night sweats, fatigue, weight loss, weight gain. GI: as per HPI    Integumentary: dermatitis or abnormal moles. HEENT:  visual changes, vertigo, epistaxis, dysphagia, hoarseness    Cardiac: chest pain, palpitations, hypertension, edema,  varicosities    Resp:  cough, shortness of breath, wheezing, hemoptysis, snoring,  reactive airway disease    : hematuria, dysuria, frequency, urgency, nocturia, stress urinary incontinence    MSK: weakness, joint pain,  arthritis    Endocrine: diabetes, thyroid disease, polyuria, polydipsia, polyphagia, poor wound healing, heat intolerance,cold intolerance. Lymph/Heme: anemia, bruising, history of blood transfusions. Neuro: dizziness, headache, fainting, seizures, stroke.     Psychiatry:  Anxiety, depression, psychosis          Objective:     Physical Exam:  Visit Vitals    /67 (BP 1 Location: Left arm, BP Patient Position: At rest)    Pulse 82    Temp 98.8 °F (37.1 °C)    Resp 18    Ht 5' 3\" (1.6 m)    Wt 64.4 kg (142 lb)    SpO2 95%    Breastfeeding No    BMI 25.15 kg/m2       General: Well developed, well nourished 68 y.o. female in no acute distress  ENMT: normocephalic, atraumatic mouth:clear, no overt lesions, oral mucosa pink and moist  Neck: supple, no masses, no adenopathy or carotid bruits, trachea midline  Skin: warm, smooth, dry and well perfused  Respiratory: clear to auscultation bilaterally, no wheeze, rhonchi or rales, excursions normal and symmetrical  Cardiovascular: Regular rate and rhythm, no murmurs, clicks, gallops or rubs, no edema or varicosities  Gastrointestinal: soft, nontender, nondistended, normoactive bowel sounds, no hernias, no hepatosplenomegaly,   Musculoskeletal: warm, well-perfused, no tenderness or swelling, normal gait/station  Neuro: sensation and strength grossly intact and symmetrical  Psych: alert and oriented to person, place and time    Imaging Review:     EXAM: CT of the abdomen and pelvis     INDICATION: 68year-old patient with epigastric abdominal pain.     COMPARISON: Several prior examinations, most recently ultrasound performed  9/28/2017.     TECHNIQUE: Axial CT imaging of the abdomen and pelvis was performed without oral  and with intravenous contrast. Multiplanar reformats were generated. One or more  dose reduction techniques were used on this CT: automated exposure control,  adjustment of the mAs and/or kVp according to patient's size, and iterative  reconstruction techniques. The specific techniques utilized on this CT exam have  been documented in the patient's electronic medical record.     FINDINGS:     LOWER CHEST: Minor dependent atelectasis is present at each lung base. No focal  pneumonic opacity or pleural effusion.  Stable cardiac size. No pericardial  effusion.     LIVER, BILIARY: Hepatic parenchymal enhancement is uniform. Right hepatic dome  cyst unchanged. No intrahepatic biliary ductal dilatation. Gallbladder is mildly  distended. No pericholecystic fluid or fat stranding. No radiopaque gallstones. Common bile duct diameter of approximately 9 mm, unchanged     PANCREAS: Normal, no peripancreatic fat stranding.     SPLEEN: Normal.     ADRENALS: Normal.     KIDNEYS/URETERS/BLADDER: Renal parenchymal enhancement is symmetric. Lower pole  left renal cyst unchanged. Additional smaller subcentimeter hypodensities noted  involving each kidney, stable and too small to further characterize, most in  keeping with small cysts.     PELVIC ORGANS: Uterus is surgically absent. Hypoattenuating left adnexal nodule  measures approximately 1.8 x 2.1 cm in size, unchanged, most in keeping with a  simple cyst.     VASCULATURE: Aortobiiliac atherosclerotic calcification noted without evidence  of aneurysmal dilatation.     LYMPH NODES: No enlarged lymph nodes. Subcentimeter mesenteric and  retroperitoneal lymph nodes are noted. No abdominal or pelvic adenopathy.     GASTROINTESTINAL TRACT: There is a small hiatal hernia. Small intestine and  large intestine are normal in course and caliber. No bowel obstruction. No free  intraperitoneal gas. Diverticulosis is present without evidence to suggest  diverticulitis.     BONES: No acute or aggressive osseous abnormalities identified. Lower lumbar  spondylosis and facet osteoarthrosis is present.     OTHER: Tiny fat-containing umbilical hernia.     _______________     IMPRESSION  IMPRESSION:     1. Nonspecific gallbladder distention, without radiopaque gallstone. The common  bile duct diameter is upper limits of normal for age. No intrahepatic biliary  ductal dilatation. 2. Small hiatal hernia, unchanged. 3. Diverticulosis without evidence to suggest diverticulitis.         Ultrasound Abdomen Limited    History: Epigastric and right upper quadrant pain     Comparison: CT of the abdomen and pelvis 2/8/2017, prior ultrasound 2/8/2017,  prior MRCP 1/5/2017     Technique: Multiple gray scale sonographic images of the abdomen were obtained  with attention to the right upper quadrant. Additional color Doppler and Doppler  wave form images were also acquired.     Findings: Examination is limited by bowel gas.     The liver is normal in size and echotexture. No focal hepatic lesions are  evident. There is mild intrahepatic biliary ductal dilatation. The portal vein  is patent with hepatopedal blood flow and measures mm in diameter.     The gallbladder is distended. No pericholecystic fluid or wall thickening. Layering mixed echogenicity material noted, likely in keeping with biliary  sludge. There is no cholelithiasis evident. The common bile duct measures  approximately 12 mm in diameter. Sonographic Garvey's sign is absent per the  technologist.     The pancreas is not well visualized secondary to overlying bowel gas.     The right kidney measures 10.4 cm in length. There is no intrarenal mass,  hydronephrosis, or nephrolithiasis. Interpolar right renal cyst measures  approximately 8 x 7 x 5 mm.     The included portion of the inferior vena cava is patent.     IMPRESSION  IMPRESSION:   1. Mild intrahepatic and extra hepatic biliary ductal dilatation. No evidence of  common duct stone on the provided images.     2. Mildly distended gallbladder, nonspecific, without pericholecystic fluid or  wall thickening. No gallstones. Layering biliary sludge noted.     Labs:   Recent Results (from the past 12 hour(s))   CBC W/O DIFF    Collection Time: 09/30/17  5:30 AM   Result Value Ref Range    WBC 9.3 4.6 - 13.2 K/uL    RBC 3.47 (L) 4.20 - 5.30 M/uL    HGB 11.4 (L) 12.0 - 16.0 g/dL    HCT 33.9 (L) 35.0 - 45.0 %    MCV 97.7 (H) 74.0 - 97.0 FL    MCH 32.9 24.0 - 34.0 PG    MCHC 33.6 31.0 - 37.0 g/dL    RDW 13.4 11.6 - 14.5 % PLATELET 662 953 - 673 K/uL    MPV 9.4 9.2 - 60.1 FL   METABOLIC PANEL, COMPREHENSIVE    Collection Time: 09/30/17  5:30 AM   Result Value Ref Range    Sodium 140 136 - 145 mmol/L    Potassium 3.2 (L) 3.5 - 5.5 mmol/L    Chloride 106 100 - 108 mmol/L    CO2 25 21 - 32 mmol/L    Anion gap 9 3.0 - 18 mmol/L    Glucose 83 74 - 99 mg/dL    BUN 13 7.0 - 18 MG/DL    Creatinine 0.77 0.6 - 1.3 MG/DL    BUN/Creatinine ratio 17 12 - 20      GFR est AA >60 >60 ml/min/1.73m2    GFR est non-AA >60 >60 ml/min/1.73m2    Calcium 8.1 (L) 8.5 - 10.1 MG/DL    Bilirubin, total 1.4 (H) 0.2 - 1.0 MG/DL    ALT (SGPT) 165 (H) 13 - 56 U/L    AST (SGOT) 42 (H) 15 - 37 U/L    Alk. phosphatase 259 (H) 45 - 117 U/L    Protein, total 5.8 (L) 6.4 - 8.2 g/dL    Albumin 2.8 (L) 3.4 - 5.0 g/dL    Globulin 3.0 2.0 - 4.0 g/dL    A-G Ratio 0.9 0.8 - 1.7           Assessment:   Chronic cholecystitis  Cholangitis  Cholelithiasis, 574.20  recent choledocholithiasis. Patient has significant symptoms and wishes to proceed with surgical intervention. Plan:   I explained the indications for laparoscopic cholecystectomy as well as the alternatives. I discussed the potential risks, benefits, and likely outcomes of this course of care, including but not limited to bleeding, wound infection, need for reoperation, bile leak, hernia formation, trocar injuries to small bowel and other structures. We discussed also the possible need for conversion to open procedure as well as a less than one percent risk of common bile duct injury. We have also discussed the indications for cholangiogram including identification of the cystic duct, verification of normal ductal anatomy and evaluating choledocholithiasis. We have also discussed that they may require an ERCP if there are retained common bile duct stones which may necessitate and additional drain placement and hospital stay. The patient indicates they understand the risks, accepts and wishes to proceed. -NPO post midnight, hold anticoagulation  -OR tomorrow morning.

## 2017-09-30 NOTE — PROGRESS NOTES
Daily Progress Note: 9/30/2017 10:42 AM   Admit Date: 9/28/2017    Patient seen in follow up for multiple medical problems as listed below:  Patient Active Problem List   Diagnosis Code    History of gastrointestinal hemorrhage Z87.19    Incontinence R32    Hypertension I10    Hyperlipidemia E78.5    GERD (gastroesophageal reflux disease) K21.9    PATRICIA (generalized anxiety disorder) F41.1    CKD (chronic kidney disease), stage III N18.3    Choledocholithiasis K80.50    Persistent vomiting R11.10    Elevated LFTs R94.5    Leukocytosis D72.829    Intractable abdominal pain R10.9       Assesment     68 y.o. female with PMHX of PUD fro NSAIDs (per patient), gallstones and diverticula who presents with 5 days of worsening abdominal pain with nasuea, vomitting, and some diarrhea. Her pain is lower central and slightly right abdomen, negative murphys sign. RUQ US and CT abdomen only reveal some non specific gall bladder pathology. Labs reveal a transamnitis. Her pain resided overnight with IV morphine and zofran. GI consulted. She underwent ERCP 9/29 with findings of GB stones and sludge and possible infection suggesting all her pathology was from the gall bladder. Zosyn started 9/29. Surgery consulted and plan cholecystectomy 9/31. Will stop Zosyn after surgery. GB distension, dysfunction with choledolithiasis r/o cholangitis - on emperic zosyn. GI consulting. Planned GB removal 9/31  IAbd pain N/V - etiology unclear. No murphys sign this does not appear gall bladder related. Lipase wnl. Transamnitis - etiology appears related to GB dysfunction. Improving s/p ERCP 9/29  Hx of GERD - PPI    DVT Protocol Active: yes  Code Status:  Full Code     Disposition: home monday    Subjective:     CC: Abdominal Pain    Interval History: appreciate GI and surgery consult, plan to remove GB tomorrow. Pain and transaminitis improving. Having normal BM. NPO @ midnight     ROS: 11 point ROS + l arm pain. Objective:     Visit Vitals    /67 (BP 1 Location: Left arm, BP Patient Position: At rest)    Pulse 82    Temp 98.8 °F (37.1 °C)    Resp 18    Ht 5' 3\" (1.6 m)    Wt 64.4 kg (142 lb)    SpO2 95%    Breastfeeding No    BMI 25.15 kg/m2       Temp (24hrs), Av.5 °F (36.9 °C), Min:97.8 °F (36.6 °C), Max:99 °F (37.2 °C)        Intake/Output Summary (Last 24 hours) at 17 1103  Last data filed at 17 0908   Gross per 24 hour   Intake             3385 ml   Output             2400 ml   Net              985 ml       Gen: AOx3, NAD  HEENT:  NEREYDA, EOMI. Neck: No Bruits/JVD   Lungs:   CTAB. Good respiratory effort  Heart:   RR S1 S2 without M/R/G  Abdomen: ND,,ild centeral tenderness, BSX4,   Extremities:   No LE edema. No cyanosis.   Skin:  no jaundice/lesions      Data Review:     Meds/Labs/Tests reviewed    Current Shift:  701 - 1900  In: 600 [P.O.:600]  Out: 600 [Urine:600]  Last three shifts:  1901 -  0700  In: 4130.8 [P.O.:360; I.V.:3770.8]  Out: 2725 [Urine:2725]  Recent Labs      17   0530  17   0406  17   1432   WBC  9.3  10.9  10.7   RBC  3.47*  3.66*  3.95*   HGB  11.4*  11.9*  13.1   HCT  33.9*  36.4  38.3   PLT  251  257  261   GRANS   --    --   72   LYMPH   --    --   11*   EOS   --    --   4       Recent Labs      17   0530  17   0406  17   1432   BUN  13  13  13   CREA  0.77  0.85  0.85   CA  8.1*  8.1*  8.3*   ALB  2.8*  2.9*  3.2*   K  3.2*  3.4*  3.5   NA  140  140  139   CL  106  107  107   CO2  25  25  24   GLU  83  85  92        Lab Results   Component Value Date/Time    Glucose 83 2017 05:30 AM    Glucose 85 2017 04:06 AM    Glucose 92 2017 02:32 PM    Glucose 108 02/10/2017 03:50 AM    Glucose 108 2017 04:01 AM          Care coordination with Nursing/Consultants/staff: 15  Prior history, labs, and charting reviewed: 15    Procedures/Imaging:  pCXR   THERESA US  CT ABd/pelv  ERCP     Total time spent with chart review, patient examination/education, discussion with staff on case,documentation and medication management / adjustment  :  30 Minutes      Dr Zachary Goncalves  [phone]

## 2017-09-30 NOTE — PROGRESS NOTES
Problem: Falls - Risk of  Goal: *Absence of Falls  Document Zeinab Fall Risk and appropriate interventions in the flowsheet.    Outcome: Progressing Towards Goal  Fall Risk Interventions:  Mobility Interventions: Patient to call before getting OOB           Medication Interventions: Patient to call before getting OOB     Elimination Interventions: Patient to call for help with toileting needs

## 2017-09-30 NOTE — PROGRESS NOTES
Problem: Falls - Risk of  Goal: *Absence of Falls  Document Zeinab Fall Risk and appropriate interventions in the flowsheet.    Outcome: Progressing Towards Goal  Fall Risk Interventions:              Medication Interventions: Patient to call before getting OOB     Elimination Interventions: Patient to call for help with toileting needs, Call light in reach, Toileting schedule/hourly rounds

## 2017-09-30 NOTE — ANESTHESIA PREPROCEDURE EVALUATION
Anesthetic History   No history of anesthetic complications  PONV          Review of Systems / Medical History  Patient summary reviewed and pertinent labs reviewed    Pulmonary  Within defined limits                 Neuro/Psych   Within defined limits          Comments: Generalized anxiety disorder Cardiovascular    Hypertension              Exercise tolerance: >4 METS     GI/Hepatic/Renal     GERD    Renal disease: CRI      Comments: Recent gallstone pancreatitis  gastritis Endo/Other  Within defined limits           Other Findings   Comments:   Risk Factors for Postoperative nausea/vomiting:       History of postoperative nausea/vomiting? Yes       Female? YES       Motion sickness? NO       Intended opioid administration for postoperative analgesia? YES      Smoking Abstinence  Current Smoker? NO  Elective Surgery? YES  Seen preoperatively by anesthesiologist or proxy prior to day of surgery? YES  Pt abstained from smoking 24 hours prior to anesthesia?  YES               Physical Exam    Airway  Mallampati: II  TM Distance: 4 - 6 cm  Neck ROM: normal range of motion   Mouth opening: Normal     Cardiovascular    Rhythm: regular  Rate: normal         Dental    Dentition: Full upper dentures     Pulmonary  Breath sounds clear to auscultation               Abdominal  GI exam deferred       Other Findings            Anesthetic Plan    ASA: 3  Anesthesia type: general          Induction: Intravenous  Anesthetic plan and risks discussed with: Patient

## 2017-09-30 NOTE — PROGRESS NOTES
@8163 Received report from Salima and Sissy WADE. Assumed care of patient. Patient currently in bed, resting comfortably. NAD. Callbell and belongings within reach. RN to continue to monitor patient. \    @7268 Assisted patient to bathroom and then back to bed. Patient denies any concerns. Call bell and belongings placed within reach. @0375 Bedside shift change report given to Gunjan Ruiz RN (oncoming nurse) by Mihir Chanel RN (offgoing nurse). Report included the following information SBAR and Kardex.

## 2017-09-30 NOTE — PROGRESS NOTES
1940: Assumed care of the patient from [address]. Patient c/o head pain. Pain med given by off-going nurse. Will reassess at 2040 for pain. Bed in lowest position and locked, call bell within reach. 2033: Patient voiced no complaints of pain, resting, with call bell within reach. 0330: Patient called for assistance to use the restroom. No c/o pain or discomfort at this time. Bed in lowest position and locked, call bell within reach. 2842: Bedside and Verbal shift change report given to MAURO Patel (oncoming nurse) by Sissy/MAURO Borrego (offgoing nurse). Report included the following information SBAR, Kardex, Intake/Output, MAR and Recent Results.

## 2017-10-01 ENCOUNTER — APPOINTMENT (OUTPATIENT)
Dept: GENERAL RADIOLOGY | Age: 77
DRG: 419 | End: 2017-10-01
Attending: SURGERY
Payer: MEDICARE

## 2017-10-01 LAB
ALBUMIN SERPL-MCNC: 2.9 G/DL (ref 3.4–5)
ALBUMIN/GLOB SERPL: 0.9 {RATIO} (ref 0.8–1.7)
ALP SERPL-CCNC: 235 U/L (ref 45–117)
ALT SERPL-CCNC: 125 U/L (ref 13–56)
ANION GAP SERPL CALC-SCNC: 6 MMOL/L (ref 3–18)
AST SERPL-CCNC: 24 U/L (ref 15–37)
BILIRUB SERPL-MCNC: 1 MG/DL (ref 0.2–1)
BUN SERPL-MCNC: 7 MG/DL (ref 7–18)
BUN/CREAT SERPL: 9 (ref 12–20)
CALCIUM SERPL-MCNC: 8.6 MG/DL (ref 8.5–10.1)
CHLORIDE SERPL-SCNC: 106 MMOL/L (ref 100–108)
CO2 SERPL-SCNC: 28 MMOL/L (ref 21–32)
CREAT SERPL-MCNC: 0.78 MG/DL (ref 0.6–1.3)
ERYTHROCYTE [DISTWIDTH] IN BLOOD BY AUTOMATED COUNT: 12.8 % (ref 11.6–14.5)
GLOBULIN SER CALC-MCNC: 3.4 G/DL (ref 2–4)
GLUCOSE SERPL-MCNC: 94 MG/DL (ref 74–99)
HCT VFR BLD AUTO: 35.3 % (ref 35–45)
HGB BLD-MCNC: 12 G/DL (ref 12–16)
MCH RBC QN AUTO: 32.9 PG (ref 24–34)
MCHC RBC AUTO-ENTMCNC: 34 G/DL (ref 31–37)
MCV RBC AUTO: 96.7 FL (ref 74–97)
PLATELET # BLD AUTO: 284 K/UL (ref 135–420)
PMV BLD AUTO: 9.5 FL (ref 9.2–11.8)
POTASSIUM SERPL-SCNC: 3.2 MMOL/L (ref 3.5–5.5)
PROT SERPL-MCNC: 6.3 G/DL (ref 6.4–8.2)
RBC # BLD AUTO: 3.65 M/UL (ref 4.2–5.3)
SODIUM SERPL-SCNC: 140 MMOL/L (ref 136–145)
WBC # BLD AUTO: 7.4 K/UL (ref 4.6–13.2)

## 2017-10-01 PROCEDURE — C1758 CATHETER, URETERAL: HCPCS | Performed by: SURGERY

## 2017-10-01 PROCEDURE — 77030010507 HC ADH SKN DERMBND J&J -B: Performed by: SURGERY

## 2017-10-01 PROCEDURE — 88304 TISSUE EXAM BY PATHOLOGIST: CPT | Performed by: SURGERY

## 2017-10-01 PROCEDURE — 77030032490 HC SLV COMPR SCD KNE COVD -B: Performed by: SURGERY

## 2017-10-01 PROCEDURE — 77030020263 HC SOL INJ SOD CL0.9% LFCR 1000ML

## 2017-10-01 PROCEDURE — 85027 COMPLETE CBC AUTOMATED: CPT | Performed by: INTERNAL MEDICINE

## 2017-10-01 PROCEDURE — 77030008683 HC TU ET CUF COVD -A: Performed by: NURSE ANESTHETIST, CERTIFIED REGISTERED

## 2017-10-01 PROCEDURE — 74011000250 HC RX REV CODE- 250

## 2017-10-01 PROCEDURE — 80053 COMPREHEN METABOLIC PANEL: CPT | Performed by: INTERNAL MEDICINE

## 2017-10-01 PROCEDURE — 65270000029 HC RM PRIVATE

## 2017-10-01 PROCEDURE — 77030007955 HC PCH ENDOSC SPEC J&J -B: Performed by: SURGERY

## 2017-10-01 PROCEDURE — 77030018836 HC SOL IRR NACL ICUM -A: Performed by: SURGERY

## 2017-10-01 PROCEDURE — BF131ZZ FLUOROSCOPY OF GALLBLADDER AND BILE DUCTS USING LOW OSMOLAR CONTRAST: ICD-10-PCS | Performed by: SURGERY

## 2017-10-01 PROCEDURE — 77030008477 HC STYL SATN SLP COVD -A: Performed by: NURSE ANESTHETIST, CERTIFIED REGISTERED

## 2017-10-01 PROCEDURE — 74011250636 HC RX REV CODE- 250/636: Performed by: SURGERY

## 2017-10-01 PROCEDURE — 76010000153 HC OR TIME 1.5 TO 2 HR: Performed by: SURGERY

## 2017-10-01 PROCEDURE — 74011250636 HC RX REV CODE- 250/636

## 2017-10-01 PROCEDURE — 76210000006 HC OR PH I REC 0.5 TO 1 HR: Performed by: SURGERY

## 2017-10-01 PROCEDURE — 77030036583 HC TRCR CANN BLDLSS OPT MEDT -B: Performed by: SURGERY

## 2017-10-01 PROCEDURE — 77030002933 HC SUT MCRYL J&J -A: Performed by: SURGERY

## 2017-10-01 PROCEDURE — 0FT44ZZ RESECTION OF GALLBLADDER, PERCUTANEOUS ENDOSCOPIC APPROACH: ICD-10-PCS | Performed by: SURGERY

## 2017-10-01 PROCEDURE — 77030008603 HC TRCR ENDOSC EPATH J&J -C: Performed by: SURGERY

## 2017-10-01 PROCEDURE — 77030027491 HC SHR ENDOSC COVD -B: Performed by: SURGERY

## 2017-10-01 PROCEDURE — 77030031139 HC SUT VCRL2 J&J -A: Performed by: SURGERY

## 2017-10-01 PROCEDURE — 77030025088 HC APPL CLP LIG 1 COVD -B: Performed by: SURGERY

## 2017-10-01 PROCEDURE — 74011250636 HC RX REV CODE- 250/636: Performed by: INTERNAL MEDICINE

## 2017-10-01 PROCEDURE — 74011250637 HC RX REV CODE- 250/637: Performed by: SURGERY

## 2017-10-01 PROCEDURE — 74011000250 HC RX REV CODE- 250: Performed by: SURGERY

## 2017-10-01 PROCEDURE — 74011000272 HC RX REV CODE- 272: Performed by: SURGERY

## 2017-10-01 PROCEDURE — 77030008602 HC TRCR ENDOSC EPATH J&J -B: Performed by: SURGERY

## 2017-10-01 PROCEDURE — 74011250636 HC RX REV CODE- 250/636: Performed by: ANESTHESIOLOGY

## 2017-10-01 PROCEDURE — 76060000034 HC ANESTHESIA 1.5 TO 2 HR: Performed by: SURGERY

## 2017-10-01 PROCEDURE — 36415 COLL VENOUS BLD VENIPUNCTURE: CPT | Performed by: INTERNAL MEDICINE

## 2017-10-01 PROCEDURE — 77030013079 HC BLNKT BAIR HGGR 3M -A: Performed by: NURSE ANESTHETIST, CERTIFIED REGISTERED

## 2017-10-01 PROCEDURE — 77010033678 HC OXYGEN DAILY

## 2017-10-01 PROCEDURE — 77030016151 HC PROTCTR LNS DFOG COVD -B: Performed by: SURGERY

## 2017-10-01 RX ORDER — VECURONIUM BROMIDE FOR INJECTION 1 MG/ML
INJECTION, POWDER, LYOPHILIZED, FOR SOLUTION INTRAVENOUS AS NEEDED
Status: DISCONTINUED | OUTPATIENT
Start: 2017-10-01 | End: 2017-10-01 | Stop reason: HOSPADM

## 2017-10-01 RX ORDER — HYDROMORPHONE HYDROCHLORIDE 1 MG/ML
INJECTION, SOLUTION INTRAMUSCULAR; INTRAVENOUS; SUBCUTANEOUS AS NEEDED
Status: DISCONTINUED | OUTPATIENT
Start: 2017-10-01 | End: 2017-10-01 | Stop reason: HOSPADM

## 2017-10-01 RX ORDER — SODIUM CHLORIDE 0.9 % (FLUSH) 0.9 %
5-10 SYRINGE (ML) INJECTION EVERY 8 HOURS
Status: DISCONTINUED | OUTPATIENT
Start: 2017-10-01 | End: 2017-10-01 | Stop reason: HOSPADM

## 2017-10-01 RX ORDER — DEXAMETHASONE SODIUM PHOSPHATE 4 MG/ML
INJECTION, SOLUTION INTRA-ARTICULAR; INTRALESIONAL; INTRAMUSCULAR; INTRAVENOUS; SOFT TISSUE AS NEEDED
Status: DISCONTINUED | OUTPATIENT
Start: 2017-10-01 | End: 2017-10-01 | Stop reason: HOSPADM

## 2017-10-01 RX ORDER — HYDROMORPHONE HYDROCHLORIDE 2 MG/ML
0.5 INJECTION, SOLUTION INTRAMUSCULAR; INTRAVENOUS; SUBCUTANEOUS
Status: DISCONTINUED | OUTPATIENT
Start: 2017-10-01 | End: 2017-10-01

## 2017-10-01 RX ORDER — PROPOFOL 10 MG/ML
INJECTION, EMULSION INTRAVENOUS AS NEEDED
Status: DISCONTINUED | OUTPATIENT
Start: 2017-10-01 | End: 2017-10-01 | Stop reason: HOSPADM

## 2017-10-01 RX ORDER — INSULIN LISPRO 100 [IU]/ML
INJECTION, SOLUTION INTRAVENOUS; SUBCUTANEOUS ONCE
Status: DISCONTINUED | OUTPATIENT
Start: 2017-10-01 | End: 2017-10-01 | Stop reason: HOSPADM

## 2017-10-01 RX ORDER — LIDOCAINE HYDROCHLORIDE 20 MG/ML
INJECTION, SOLUTION EPIDURAL; INFILTRATION; INTRACAUDAL; PERINEURAL AS NEEDED
Status: DISCONTINUED | OUTPATIENT
Start: 2017-10-01 | End: 2017-10-01 | Stop reason: HOSPADM

## 2017-10-01 RX ORDER — SODIUM CHLORIDE, SODIUM LACTATE, POTASSIUM CHLORIDE, CALCIUM CHLORIDE 600; 310; 30; 20 MG/100ML; MG/100ML; MG/100ML; MG/100ML
75 INJECTION, SOLUTION INTRAVENOUS CONTINUOUS
Status: DISCONTINUED | OUTPATIENT
Start: 2017-10-01 | End: 2017-10-01

## 2017-10-01 RX ORDER — LIDOCAINE HYDROCHLORIDE 10 MG/ML
INJECTION INFILTRATION; PERINEURAL AS NEEDED
Status: DISCONTINUED | OUTPATIENT
Start: 2017-10-01 | End: 2017-10-01 | Stop reason: HOSPADM

## 2017-10-01 RX ORDER — NEOSTIGMINE METHYLSULFATE 5 MG/5 ML
SYRINGE (ML) INTRAVENOUS AS NEEDED
Status: DISCONTINUED | OUTPATIENT
Start: 2017-10-01 | End: 2017-10-01 | Stop reason: HOSPADM

## 2017-10-01 RX ORDER — FENTANYL CITRATE 50 UG/ML
50 INJECTION, SOLUTION INTRAMUSCULAR; INTRAVENOUS
Status: DISCONTINUED | OUTPATIENT
Start: 2017-10-01 | End: 2017-10-01

## 2017-10-01 RX ORDER — SODIUM CHLORIDE, SODIUM LACTATE, POTASSIUM CHLORIDE, CALCIUM CHLORIDE 600; 310; 30; 20 MG/100ML; MG/100ML; MG/100ML; MG/100ML
INJECTION, SOLUTION INTRAVENOUS
Status: DISCONTINUED | OUTPATIENT
Start: 2017-10-01 | End: 2017-10-01 | Stop reason: HOSPADM

## 2017-10-01 RX ORDER — SODIUM CHLORIDE, SODIUM LACTATE, POTASSIUM CHLORIDE, CALCIUM CHLORIDE 600; 310; 30; 20 MG/100ML; MG/100ML; MG/100ML; MG/100ML
75 INJECTION, SOLUTION INTRAVENOUS CONTINUOUS
Status: DISCONTINUED | OUTPATIENT
Start: 2017-10-01 | End: 2017-10-01 | Stop reason: HOSPADM

## 2017-10-01 RX ORDER — ONDANSETRON 2 MG/ML
INJECTION INTRAMUSCULAR; INTRAVENOUS AS NEEDED
Status: DISCONTINUED | OUTPATIENT
Start: 2017-10-01 | End: 2017-10-01 | Stop reason: HOSPADM

## 2017-10-01 RX ORDER — SODIUM CHLORIDE 0.9 % (FLUSH) 0.9 %
5-10 SYRINGE (ML) INJECTION AS NEEDED
Status: DISCONTINUED | OUTPATIENT
Start: 2017-10-01 | End: 2017-10-01 | Stop reason: HOSPADM

## 2017-10-01 RX ORDER — NALOXONE HYDROCHLORIDE 0.4 MG/ML
0.2 INJECTION, SOLUTION INTRAMUSCULAR; INTRAVENOUS; SUBCUTANEOUS AS NEEDED
Status: DISCONTINUED | OUTPATIENT
Start: 2017-10-01 | End: 2017-10-01

## 2017-10-01 RX ORDER — SUCCINYLCHOLINE CHLORIDE 20 MG/ML
INJECTION INTRAMUSCULAR; INTRAVENOUS AS NEEDED
Status: DISCONTINUED | OUTPATIENT
Start: 2017-10-01 | End: 2017-10-01 | Stop reason: HOSPADM

## 2017-10-01 RX ORDER — FENTANYL CITRATE 50 UG/ML
INJECTION, SOLUTION INTRAMUSCULAR; INTRAVENOUS AS NEEDED
Status: DISCONTINUED | OUTPATIENT
Start: 2017-10-01 | End: 2017-10-01 | Stop reason: HOSPADM

## 2017-10-01 RX ORDER — GLYCOPYRROLATE 0.2 MG/ML
INJECTION INTRAMUSCULAR; INTRAVENOUS AS NEEDED
Status: DISCONTINUED | OUTPATIENT
Start: 2017-10-01 | End: 2017-10-01 | Stop reason: HOSPADM

## 2017-10-01 RX ORDER — ONDANSETRON 2 MG/ML
4 INJECTION INTRAMUSCULAR; INTRAVENOUS
Status: DISCONTINUED | OUTPATIENT
Start: 2017-10-01 | End: 2017-10-01

## 2017-10-01 RX ORDER — HYDROMORPHONE HCL IN 0.9% NACL 50 MG/50ML
1 PLASTIC BAG, INJECTION (ML) INJECTION
Status: DISCONTINUED | OUTPATIENT
Start: 2017-10-01 | End: 2017-10-02 | Stop reason: HOSPADM

## 2017-10-01 RX ORDER — ACETAMINOPHEN 325 MG/1
650 TABLET ORAL
Status: DISCONTINUED | OUTPATIENT
Start: 2017-10-01 | End: 2017-10-02 | Stop reason: HOSPADM

## 2017-10-01 RX ADMIN — PROPOFOL 120 MG: 10 INJECTION, EMULSION INTRAVENOUS at 09:06

## 2017-10-01 RX ADMIN — VECURONIUM BROMIDE FOR INJECTION 1 MG: 1 INJECTION, POWDER, LYOPHILIZED, FOR SOLUTION INTRAVENOUS at 09:06

## 2017-10-01 RX ADMIN — VECURONIUM BROMIDE FOR INJECTION 1 MG: 1 INJECTION, POWDER, LYOPHILIZED, FOR SOLUTION INTRAVENOUS at 09:48

## 2017-10-01 RX ADMIN — ACETAMINOPHEN 650 MG: 325 TABLET, FILM COATED ORAL at 17:07

## 2017-10-01 RX ADMIN — SUCCINYLCHOLINE CHLORIDE 100 MG: 20 INJECTION INTRAMUSCULAR; INTRAVENOUS at 09:06

## 2017-10-01 RX ADMIN — LIDOCAINE HYDROCHLORIDE 60 MG: 20 INJECTION, SOLUTION EPIDURAL; INFILTRATION; INTRACAUDAL; PERINEURAL at 09:06

## 2017-10-01 RX ADMIN — SODIUM CHLORIDE 125 ML/HR: 900 INJECTION, SOLUTION INTRAVENOUS at 08:02

## 2017-10-01 RX ADMIN — SODIUM CHLORIDE, SODIUM LACTATE, POTASSIUM CHLORIDE, CALCIUM CHLORIDE: 600; 310; 30; 20 INJECTION, SOLUTION INTRAVENOUS at 09:01

## 2017-10-01 RX ADMIN — HYDROMORPHONE HYDROCHLORIDE 0.5 MG: 1 INJECTION, SOLUTION INTRAMUSCULAR; INTRAVENOUS; SUBCUTANEOUS at 10:36

## 2017-10-01 RX ADMIN — ONDANSETRON 4 MG: 2 INJECTION INTRAMUSCULAR; INTRAVENOUS at 09:20

## 2017-10-01 RX ADMIN — VECURONIUM BROMIDE FOR INJECTION 1 MG: 1 INJECTION, POWDER, LYOPHILIZED, FOR SOLUTION INTRAVENOUS at 10:16

## 2017-10-01 RX ADMIN — HYDROMORPHONE HYDROCHLORIDE 0.5 MG: 1 INJECTION, SOLUTION INTRAMUSCULAR; INTRAVENOUS; SUBCUTANEOUS at 09:48

## 2017-10-01 RX ADMIN — Medication 1 MG: at 19:53

## 2017-10-01 RX ADMIN — DEXAMETHASONE SODIUM PHOSPHATE 4 MG: 4 INJECTION, SOLUTION INTRA-ARTICULAR; INTRALESIONAL; INTRAMUSCULAR; INTRAVENOUS; SOFT TISSUE at 09:20

## 2017-10-01 RX ADMIN — Medication 3 MG: at 10:33

## 2017-10-01 RX ADMIN — SODIUM CHLORIDE 125 ML/HR: 900 INJECTION, SOLUTION INTRAVENOUS at 19:44

## 2017-10-01 RX ADMIN — FENTANYL CITRATE 50 MCG: 50 INJECTION, SOLUTION INTRAMUSCULAR; INTRAVENOUS at 09:20

## 2017-10-01 RX ADMIN — VECURONIUM BROMIDE FOR INJECTION 2 MG: 1 INJECTION, POWDER, LYOPHILIZED, FOR SOLUTION INTRAVENOUS at 09:20

## 2017-10-01 RX ADMIN — FENTANYL CITRATE 50 MCG: 50 INJECTION, SOLUTION INTRAMUSCULAR; INTRAVENOUS at 09:06

## 2017-10-01 RX ADMIN — GLYCOPYRROLATE 0.4 MG: 0.2 INJECTION INTRAMUSCULAR; INTRAVENOUS at 10:33

## 2017-10-01 NOTE — PROGRESS NOTES
0800: Assumed patient care. Received report from AMNA SOUSA, [facility] (offgoing nurse). Report included SBAR, Kardex, and MAR. Patient in no signs of distress, denies pain. Call light and possessions within reach. 0800: Performed complex assessment please see corresponding flowsheet. 0919: Called OR nurse to verify PO Protonix, was informed patient was already in the OR and sedated at this time. 1150: Patient returned to floor, vital signs taken. Please see corresponding flowsheet    1940: Bedside and Verbal shift change report given to AMNA SOUSA RN (oncoming nurse) by Ivy Allen RN (offgoing nurse). Report included the following information SBAR, Kardex and MAR.

## 2017-10-01 NOTE — ANESTHESIA POSTPROCEDURE EVALUATION
Post-Anesthesia Evaluation and Assessment    Patient: Sabina Armstrong MRN: [id number]  SSN: [id number]    YOB: 1940  Age: 68 y.o. Sex: female       Cardiovascular Function/Vital Signs  Visit Vitals    /76 (BP 1 Location: Left arm, BP Patient Position: At rest;Supine)    Pulse 74    Temp 36.7 °C (98.1 °F)    Resp 14    Ht 5' 3\" (1.6 m)    Wt 64.4 kg (142 lb)    SpO2 96%    Breastfeeding No    BMI 25.15 kg/m2       Patient is status post general anesthesia for Procedure(s):  CHOLECYSTECTOMY LAPAROSCOPIC with IOC. Nausea/Vomiting: None    Postoperative hydration reviewed and adequate. Pain:  Pain Scale 1: Numeric (0 - 10) (10/01/17 1115)  Pain Intensity 1: 0 (10/01/17 1115)   Managed    Neurological Status:   Neuro (WDL): Within Defined Limits (10/01/17 1115)  Neuro  Neurologic State: Alert (09/30/17 1945)  Orientation Level: Oriented X4 (09/30/17 1945)  Cognition: Appropriate for age attention/concentration; Appropriate safety awareness; Follows commands (09/30/17 1945)  Speech: Clear (09/30/17 1945)   At baseline    Mental Status and Level of Consciousness: Arousable    Pulmonary Status:   O2 Device: Nasal cannula (10/01/17 1126)   Adequate oxygenation and airway patent    Complications related to anesthesia: None    Post-anesthesia assessment completed.  No concerns    Signed By: Josey Villarreal MD     October 1, 2017

## 2017-10-01 NOTE — ROUTINE PROCESS
Bedside and Verbal shift change report given to MAURO Leach (oncoming nurse) by [employer], RN (offgoing nurse). Report included the following information SBAR, Kardex and MAR. Consent signed. Patient resting comfortably with family at bedside.

## 2017-10-01 NOTE — PROGRESS NOTES
Inpatient Daily Progress Note    Subjective:    No complaints overnight. Pain is well controlled. She has not had nausea, has not vomitted    Objective:     Physical Exam:  Visit Vitals    /86 (BP 1 Location: Right arm, BP Patient Position: At rest)    Pulse 81    Temp 97.8 °F (36.6 °C)    Resp 16    Ht 5' 3\" (1.6 m)    Wt 64.4 kg (142 lb)    SpO2 94%    Breastfeeding No    BMI 25.15 kg/m2       Gen: Well developed, well nourished 68 y.o. female in no acute distress  Resp: clear to auscultation bilaterally, no wheezes   Cardio: Regular rate and rhythm, no murmurs, clicks, gallops or rubs, no edema  Abdomen: soft, nontender, nondistended, active bowel sounds, no hernias  Psych: alert and oriented to person, place and time      Recent Results (from the past 12 hour(s))   CBC W/O DIFF    Collection Time: 10/01/17  4:15 AM   Result Value Ref Range    WBC 7.4 4.6 - 13.2 K/uL    RBC 3.65 (L) 4.20 - 5.30 M/uL    HGB 12.0 12.0 - 16.0 g/dL    HCT 35.3 35.0 - 45.0 %    MCV 96.7 74.0 - 97.0 FL    MCH 32.9 24.0 - 34.0 PG    MCHC 34.0 31.0 - 37.0 g/dL    RDW 12.8 11.6 - 14.5 %    PLATELET 653 749 - 379 K/uL    MPV 9.5 9.2 - 12.6 FL   METABOLIC PANEL, COMPREHENSIVE    Collection Time: 10/01/17  4:15 AM   Result Value Ref Range    Sodium 140 136 - 145 mmol/L    Potassium 3.2 (L) 3.5 - 5.5 mmol/L    Chloride 106 100 - 108 mmol/L    CO2 28 21 - 32 mmol/L    Anion gap 6 3.0 - 18 mmol/L    Glucose 94 74 - 99 mg/dL    BUN 7 7.0 - 18 MG/DL    Creatinine 0.78 0.6 - 1.3 MG/DL    BUN/Creatinine ratio 9 (L) 12 - 20      GFR est AA >60 >60 ml/min/1.73m2    GFR est non-AA >60 >60 ml/min/1.73m2    Calcium 8.6 8.5 - 10.1 MG/DL    Bilirubin, total 1.0 0.2 - 1.0 MG/DL    ALT (SGPT) 125 (H) 13 - 56 U/L    AST (SGOT) 24 15 - 37 U/L    Alk.  phosphatase 235 (H) 45 - 117 U/L    Protein, total 6.3 (L) 6.4 - 8.2 g/dL    Albumin 2.9 (L) 3.4 - 5.0 g/dL    Globulin 3.4 2.0 - 4.0 g/dL    A-G Ratio 0.9 0.8 - 1.7         Assessment: Magaly Barba is a 68 y.o. female s/p 2 episodes of choledocholithiasis requiring ERCP, now clinically stable. For lap ladonna today.       Plan:   -to OR today  -

## 2017-10-01 NOTE — PERIOP NOTES
1053 Received Patient to PACU. To monitors per protocol. Will continued to monitor. 1130 TRANSFER - OUT REPORT:    Verbal report given to 711 Gregorio Allen RN(name) on Kendall Li  being transferred to 2100(unit) for routine post - op       Report consisted of patients Situation, Background, Assessment and   Recommendations(SBAR). Information from the following report(s) SBAR, OR Summary, Procedure Summary, Intake/Output and MAR was reviewed with the receiving nurse. Lines:   Peripheral IV 10/01/17 Right Antecubital (Active)   Site Assessment Clean, dry, & intact 10/1/2017 11:15 AM   Phlebitis Assessment 0 10/1/2017 11:15 AM   Infiltration Assessment 0 10/1/2017 11:15 AM   Dressing Status Clean, dry, & intact 10/1/2017 11:15 AM   Dressing Type Tape;Transparent 10/1/2017 11:15 AM   Hub Color/Line Status Pink; Infusing 10/1/2017 11:15 AM   Action Taken Open ports on tubing capped 10/1/2017 11:15 AM   Alcohol Cap Used Yes 10/1/2017 11:15 AM        Opportunity for questions and clarification was provided.       Patient transported with:   Registered Nurse

## 2017-10-01 NOTE — PROGRESS NOTES
In patients chart d/t this nurse precepting primary nurse.       1125:  Received report from  Cristel Fergusonil

## 2017-10-01 NOTE — PROGRESS NOTES
Problem: Falls - Risk of  Goal: *Absence of Falls  Document Zeinab Fall Risk and appropriate interventions in the flowsheet.    Outcome: Progressing Towards Goal  Fall Risk Interventions:  Mobility Interventions: Patient to call before getting OOB           Medication Interventions: Patient to call before getting OOB     Elimination Interventions: Call light in reach

## 2017-10-01 NOTE — PROGRESS NOTES
Bedside and Verbal shift change report given to Hermelinda/Sister Nahun RN (oncoming nurse) by Jessy Garcia RN (offgoing nurse). Report included the following information SBAR, Kardex and MAR. Assessment completed. Patient AAOX4. Call bell within reach. Bed locked and at lowest position.

## 2017-10-01 NOTE — OP NOTES
Procedure: Laparoscopic Cholecystectomy with Intraoperative Cholangiogram    Pre-operative Diagnosis: Choledocholithiasis s/p ERCP for cholangitis    Post-operative Diagnosis:  Same    Surgeon: Royal Guzman MD [facility]    Anesthesia: General     Assistant:  Princess Lane    Findings:  Cholecystitis with Cholelithiasis, cholangiogram with mild dilation of duct, no stones impacted at ampulla, contrast flowed into duodenum    Estimated Blood Loss:  20mL           Drains: none           Total IV Fluids: 900ml           Specimens: Gallbladder             Complications:  None; patient tolerated the procedure well. Disposition: PACU - hemodynamically stable. Condition: stable    Procedure Details:   After reviewing the history, physical exam and relevent findings, consent was verified. The risks, benefits, complications, treatment options, and expected outcomes were discussed with the patient. The possibilities of reaction to medication, pulmonary aspiration, perforation of viscus, bleeding, recurrent infection, finding a normal gallbladder, the need for additional procedures, failure to diagnose a condition, the possible need to convert to an open procedure, and creating a complication requiring transfusion or operation were discussed with the patient. The patient and/or family concurred with the proposed plan, giving informed consent. The patient was taken to the operating room, sequential compression devices were placed, intravenous antibiotics were administered on the floor as she was on Zosyn for cholangitis and general endotracheal anesthesia was administered. Time out procedure was performed during which all parties agreed to the proposed planned operation. The abdomen was then prepped and draped in the usual sterile fashion. LUQ pneumoperitoneum was established via a Veress needle and insufflation to 15mmHg performed.   Once the abdomen was fully insufflated, supraumbilical incision was made and dissection was carried down using an Optiview 5mm Trocar with 5-0 scope. Once the abdominal cavity was accessed, we verified there was no evidence of injury upon entry. We then switched over to a 5-30 scope and surveyed the abdomen. A 360-degree evaluation of the abdomen was then performed from this trocar site. No other gross abnormalities were identified. A subxiphoid incision was then made through which a 10-12 trocar was advanced under direct visualization. Two additional right sided 5mm trocars were placed under direct visualization. The patient was then placed in reverse Trendelenburg. The fundus of the gallbladder was then grasped and retracted over the dome of the liver. The infundibulum was grasped and retracted to the patient's right and inferiorly. A combination of blunt and suction dissection was used to dissect out the space between the infundibulum and the gallbladder bed, and then blunt dissection was used to dissect out the cystic duct and cystic artery as well as the remainder of the triangle of Calot. A critical view was obtained where these were the only 2 structures entering the gallbladder. Once this critical view was obtained, a clip was placed at the base of the infundibulum. An incision was made in the very distal cystic duct. Then a 14-gauge angiocatheter was used to introduce a ureteral stent catheter into the abdominal cavity. The catheter was irrigated with saline and then inserted into the cystic duct and advanced to within the cystic duct. It was then clipped into place and was noted to flush easily. The cystic artery was doubly clipped proximally and singly clipped distally. All instruments were removed from the patient's abdomen. The patient was placed in a supine position and a C-arm was brought into the field.  A  film was shot to obtain proper orientation and alignment of the C-arm, and then, under real-time fluoroscopy, contrast was injected slowly by hand. Contrast was seen flowing through the cystic duct and into the common bile duct. Initially, the distal CBD was dilated, there was no entry into duodenum, and no flow seen proximally. We then gave glucagon and flushed for 5 minutes. We then repeated the cholangiogram. There was no filling defect seen in the common bile duct, however, 2 stones were extracted from cystic duct while repositioning catheter. We then re-imaged and saw good flow into the duodenum. Contrast easily refluxed up the common hepatic duct and into right and left hepatic ducts and into secondary and tertiary biliary radicles within the liver without signs of filling defects and a negative cholangiogram was declared. The C-arm was removed, the cholangiogram catheter removed. Two clips were  placed on the proximal cystic duct and then it was divided with laparoscopic scissors. The cystic artery was divided in like fashion. The infundibulum of the gallbladder was then retracted towards the anterior abdominal wall and the gallbladder removed from the gallbladder fossa with electrocautery. The gallbladder was then placed over the right lobe of the liver. An Endocatch bag was then inserted through the subxiphoid trochar. The gallbladder was then placed in the EndoCatch bag and removed through the subxiphoid trocar site. The gallbladder was then sent to Pathology. Next, the right upper quadrant was inspected. The gallbladder fossa appeared dry. There was no evidence of any bleeding. The cystic duct remnant had 2 clips across it with no evidence of any leakage of bile. The cystic artery remnant was inspected. It had 2 clips across it with no evidence of any bleeding. The right upper quadrant was then irrigated and noted to be clear. Pneumoinsufflation was then vented through the trocar sites.  The trocars were then removed, incisions were anesthetized with local anesthesia, rendered hemostatic and closed with 4-0 Monocryl and Dermabond. The patient was extubated in the room and taken to the [address] [facility]  Unit in stable condition. Instrument, sponge, and needle counts were reported as  correct x2.

## 2017-10-01 NOTE — PROGRESS NOTES
Daily Progress Note: 10/1/2017 10:42 AM   Admit Date: 9/28/2017    Patient seen in follow up for multiple medical problems as listed below:  Patient Active Problem List   Diagnosis Code    History of gastrointestinal hemorrhage Z87.19    Incontinence R32    Hypertension I10    Hyperlipidemia E78.5    GERD (gastroesophageal reflux disease) K21.9    PATRICIA (generalized anxiety disorder) F41.1    CKD (chronic kidney disease), stage III N18.3    Choledocholithiasis K80.50    Persistent vomiting R11.10    Elevated LFTs R79.89    Leukocytosis D72.829    Intractable abdominal pain R10.9       Assesment     68 y.o. female with PMHX of PUD fro NSAIDs (per patient), gallstones and diverticula who presents with 5 days of worsening abdominal pain with nasuea, vomitting, and some diarrhea. Her pain is lower central and slightly right abdomen, negative murphys sign. RUQ US and CT abdomen only reveal some non specific gall bladder pathology. Labs reveal a transamnitis. Her pain resided overnight with IV morphine and zofran. GI consulted. She underwent ERCP 9/29 with findings of GB stones and sludge and possible infection suggesting all her pathology was from the gall bladder. Zosyn started 9/29. Surgery consulted and plan cholecystectomy 9/31. Zosyn stopped 9/30 with no signs of infection. Patient underwent Lap Ladonna 10/1 with Dr Sade Ramirez. GB distension, dysfunction with choledolithiasis r/o cholangitis - on emperic zosyn. GI consulting. Planned GB removal 9/31  IAbd pain N/V - etiology unclear. No murphys sign this does not appear gall bladder related. Lipase wnl. Transamnitis - etiology appears related to GB dysfunction. Improving s/p ERCP 9/29  Hx of GERD - PPI    DVT Protocol Active: yes  Code Status:  Full Code     Disposition: home tonight or monday    Subjective:     CC: Abdominal Pain    Interval History: s/p lab ladonna. No issues. Seen in room.  Possible discharge tonight or this am pending clinical status. ROS: 11 point ROS neg    Objective:     Visit Vitals    /76 (BP 1 Location: Left arm, BP Patient Position: At rest;Supine)    Pulse 72    Temp 98.1 °F (36.7 °C)    Resp 18    Ht 5' 3\" (1.6 m)    Wt 64.4 kg (142 lb)    SpO2 92%    Breastfeeding No    BMI 25.15 kg/m2       Temp (24hrs), Av.3 °F (36.8 °C), Min:97.8 °F (36.6 °C), Max:99.5 °F (37.5 °C)        Intake/Output Summary (Last 24 hours) at 10/01/17 1211  Last data filed at 10/01/17 1115   Gross per 24 hour   Intake          2414.58 ml   Output             2320 ml   Net            94.58 ml       Gen: AOx3, NAD  HEENT:  NEREYDA, EOMI. Neck: No Bruits/JVD   Lungs:   CTAB. Good respiratory effort  Heart:   RR S1 S2 without M/R/G  Abdomen: ND,,ild centeral tenderness, BSX4,   Extremities:   No LE edema. No cyanosis.   Skin:  no jaundice/lesions      Data Review:     Meds/Labs/Tests reviewed    Current Shift:  10/01 0701 - 10/01 1900  In: 1000 [I.V.:1000]  Out: 20   Last three shifts:  1901 - 10/01 0700  In: 3749.6 [P.O.:960; I.V.:2789.6]  Out: 4900 [Urine:4900]  Recent Labs      10/01/17   0415  09/30/17   0530  09/29/17   0406  09/28/17   1432   WBC  7.4  9.3  10.9  10.7   RBC  3.65*  3.47*  3.66*  3.95*   HGB  12.0  11.4*  11.9*  13.1   HCT  35.3  33.9*  36.4  38.3   PLT  284  251  257  261   GRANS   --    --    --   72   LYMPH   --    --    --   11*   EOS   --    --    --   4       Recent Labs      10/01/17   0415  09/30/17   0530  09/29/17   0406  09/28/17   1432   BUN  7  13  13  13   CREA  0.78  0.77  0.85  0.85   CA  8.6  8.1*  8.1*  8.3*   ALB  2.9*  2.8*  2.9*  3.2*   K  3.2*  3.2*  3.4*  3.5   NA  140  140  140  139   CL  106  106  107  107   CO2  28  25  25  24   GLU  94  83  85  92        Lab Results   Component Value Date/Time    Glucose 94 10/01/2017 04:15 AM    Glucose 83 2017 05:30 AM    Glucose 85 2017 04:06 AM    Glucose 92 2017 02:32 PM    Glucose 108 02/10/2017 03:50 AM          Care coordination with Nursing/Consultants/staff: 15  Prior history, labs, and charting reviewed: 15    Procedures/Imaging:  pCXR   THERESA US  CT ABd/pelv  ERCP 9/29    Total time spent with chart review, patient examination/education, discussion with staff on case,documentation and medication management / adjustment  :  30 Minutes      Dr Samir Garcia  [phone]

## 2017-10-02 VITALS
TEMPERATURE: 98.1 F | RESPIRATION RATE: 16 BRPM | BODY MASS INDEX: 25.16 KG/M2 | HEART RATE: 76 BPM | DIASTOLIC BLOOD PRESSURE: 83 MMHG | OXYGEN SATURATION: 98 % | WEIGHT: 142 LBS | HEIGHT: 63 IN | SYSTOLIC BLOOD PRESSURE: 168 MMHG

## 2017-10-02 LAB
ALBUMIN SERPL-MCNC: 2.6 G/DL (ref 3.4–5)
ALBUMIN/GLOB SERPL: 0.8 {RATIO} (ref 0.8–1.7)
ALP SERPL-CCNC: 195 U/L (ref 45–117)
ALT SERPL-CCNC: 98 U/L (ref 13–56)
ANION GAP SERPL CALC-SCNC: 12 MMOL/L (ref 3–18)
AST SERPL-CCNC: 31 U/L (ref 15–37)
BASOPHILS # BLD: 0 K/UL (ref 0–0.06)
BASOPHILS NFR BLD: 0 % (ref 0–2)
BILIRUB SERPL-MCNC: 0.7 MG/DL (ref 0.2–1)
BUN SERPL-MCNC: 9 MG/DL (ref 7–18)
BUN/CREAT SERPL: 14 (ref 12–20)
CALCIUM SERPL-MCNC: 8.6 MG/DL (ref 8.5–10.1)
CHLORIDE SERPL-SCNC: 106 MMOL/L (ref 100–108)
CO2 SERPL-SCNC: 24 MMOL/L (ref 21–32)
CREAT SERPL-MCNC: 0.66 MG/DL (ref 0.6–1.3)
DIFFERENTIAL METHOD BLD: ABNORMAL
EOSINOPHIL # BLD: 0.1 K/UL (ref 0–0.4)
EOSINOPHIL NFR BLD: 1 % (ref 0–5)
ERYTHROCYTE [DISTWIDTH] IN BLOOD BY AUTOMATED COUNT: 12.9 % (ref 11.6–14.5)
GLOBULIN SER CALC-MCNC: 3.4 G/DL (ref 2–4)
GLUCOSE SERPL-MCNC: 84 MG/DL (ref 74–99)
HCT VFR BLD AUTO: 33.2 % (ref 35–45)
HGB BLD-MCNC: 11.3 G/DL (ref 12–16)
LYMPHOCYTES # BLD: 2.2 K/UL (ref 0.9–3.6)
LYMPHOCYTES NFR BLD: 21 % (ref 21–52)
MCH RBC QN AUTO: 33 PG (ref 24–34)
MCHC RBC AUTO-ENTMCNC: 34 G/DL (ref 31–37)
MCV RBC AUTO: 97.1 FL (ref 74–97)
MONOCYTES # BLD: 1.4 K/UL (ref 0.05–1.2)
MONOCYTES NFR BLD: 13 % (ref 3–10)
NEUTS SEG # BLD: 6.8 K/UL (ref 1.8–8)
NEUTS SEG NFR BLD: 65 % (ref 40–73)
PLATELET # BLD AUTO: 305 K/UL (ref 135–420)
PMV BLD AUTO: 9.5 FL (ref 9.2–11.8)
POTASSIUM SERPL-SCNC: 3.5 MMOL/L (ref 3.5–5.5)
PROT SERPL-MCNC: 6 G/DL (ref 6.4–8.2)
RBC # BLD AUTO: 3.42 M/UL (ref 4.2–5.3)
SODIUM SERPL-SCNC: 142 MMOL/L (ref 136–145)
WBC # BLD AUTO: 10.6 K/UL (ref 4.6–13.2)

## 2017-10-02 PROCEDURE — 74011250636 HC RX REV CODE- 250/636: Performed by: INTERNAL MEDICINE

## 2017-10-02 PROCEDURE — 36415 COLL VENOUS BLD VENIPUNCTURE: CPT | Performed by: SURGERY

## 2017-10-02 PROCEDURE — 74011250637 HC RX REV CODE- 250/637: Performed by: INTERNAL MEDICINE

## 2017-10-02 PROCEDURE — 77030020263 HC SOL INJ SOD CL0.9% LFCR 1000ML

## 2017-10-02 PROCEDURE — 80053 COMPREHEN METABOLIC PANEL: CPT | Performed by: SURGERY

## 2017-10-02 PROCEDURE — 85025 COMPLETE CBC W/AUTO DIFF WBC: CPT | Performed by: SURGERY

## 2017-10-02 RX ADMIN — PANTOPRAZOLE SODIUM 40 MG: 40 TABLET, DELAYED RELEASE ORAL at 10:54

## 2017-10-02 RX ADMIN — SODIUM CHLORIDE 125 ML/HR: 900 INJECTION, SOLUTION INTRAVENOUS at 03:31

## 2017-10-02 NOTE — DISCHARGE INSTRUCTIONS
DISCHARGE SUMMARY from Nurse    The following personal items are in your possession at time of discharge:    Dental Appliances: None  Visual Aid: Glasses, With patient     Home Medications: None  Jewelry: Bracelet, Ring, Watch  Clothing: None  Other Valuables: None  Personal Items Sent to Safe:  (na)          PATIENT INSTRUCTIONS:    After general anesthesia or intravenous sedation, for 24 hours or while taking prescription Narcotics:  · Limit your activities  · Do not drive and operate hazardous machinery  · Do not make important personal or business decisions  · Do  not drink alcoholic beverages  · If you have not urinated within 8 hours after discharge, please contact your surgeon on call. Report the following to your surgeon:  · Excessive pain, swelling, redness or odor of or around the surgical area  · Temperature over 100.5  · Nausea and vomiting lasting longer than 4 hours or if unable to take medications  · Any signs of decreased circulation or nerve impairment to extremity: change in color, persistent  numbness, tingling, coldness or increase pain  · Any questions        What to do at Home:  Recommended activity: as prescribed by physician    If you experience any of the following symptoms Signs and symptoms of a Stroke and Warning Signs of a Heart Attack as listed in the discharge teaching as \"When to call for Help\", please follow up with your primary care physician and/or call 911. *  Please give a list of your current medications to your Primary Care Provider. *  Please update this list whenever your medications are discontinued, doses are      changed, or new medications (including over-the-counter products) are added. *  Please carry medication information at all times in case of emergency situations.           These are general instructions for a healthy lifestyle:    No smoking/ No tobacco products/ Avoid exposure to second hand smoke    Surgeon General's Warning:  Quitting smoking now greatly reduces serious risk to your health. Obesity, smoking, and sedentary lifestyle greatly increases your risk for illness    A healthy diet, regular physical exercise & weight monitoring are important for maintaining a healthy lifestyle    You may be retaining fluid if you have a history of heart failure or if you experience any of the following symptoms:  Weight gain of 3 pounds or more overnight or 5 pounds in a week, increased swelling in our hands or feet or shortness of breath while lying flat in bed. Please call your doctor as soon as you notice any of these symptoms; do not wait until your next office visit. Recognize signs and symptoms of STROKE:    F-face looks uneven    A-arms unable to move or move unevenly    S-speech slurred or non-existent    T-time-call 911 as soon as signs and symptoms begin-DO NOT go       Back to bed or wait to see if you get better-TIME IS BRAIN. Warning Signs of HEART ATTACK     Call 911 if you have these symptoms:   Chest discomfort. Most heart attacks involve discomfort in the center of the chest that lasts more than a few minutes, or that goes away and comes back. It can feel like uncomfortable pressure, squeezing, fullness, or pain.  Discomfort in other areas of the upper body. Symptoms can include pain or discomfort in one or both arms, the back, neck, jaw, or stomach.  Shortness of breath with or without chest discomfort.  Other signs may include breaking out in a cold sweat, nausea, or lightheadedness. Don't wait more than five minutes to call 911 - MINUTES MATTER! Fast action can save your life. Calling 911 is almost always the fastest way to get lifesaving treatment. Emergency Medical Services staff can begin treatment when they arrive -- up to an hour sooner than if someone gets to the hospital by car. The discharge information has been reviewed with the patient. The patient verbalized understanding.     Discharge medications reviewed with the patient and appropriate educational materials and side effects teaching were provided. Abdominal Pain: Care Instructions  Your Care Instructions    Abdominal pain has many possible causes. Some aren't serious and get better on their own in a few days. Others need more testing and treatment. If your pain continues or gets worse, you need to be rechecked and may need more tests to find out what is wrong. You may need surgery to correct the problem. Don't ignore new symptoms, such as fever, nausea and vomiting, urination problems, pain that gets worse, and dizziness. These may be signs of a more serious problem. Your doctor may have recommended a follow-up visit in the next 8 to 12 hours. If you are not getting better, you may need more tests or treatment. The doctor has checked you carefully, but problems can develop later. If you notice any problems or new symptoms, get medical treatment right away. Follow-up care is a key part of your treatment and safety. Be sure to make and go to all appointments, and call your doctor if you are having problems. It's also a good idea to know your test results and keep a list of the medicines you take. How can you care for yourself at home? · Rest until you feel better. · To prevent dehydration, drink plenty of fluids, enough so that your urine is light yellow or clear like water. Choose water and other caffeine-free clear liquids until you feel better. If you have kidney, heart, or liver disease and have to limit fluids, talk with your doctor before you increase the amount of fluids you drink. · If your stomach is upset, eat mild foods, such as rice, dry toast or crackers, bananas, and applesauce. Try eating several small meals instead of two or three large ones. · Wait until 48 hours after all symptoms have gone away before you have spicy foods, alcohol, and drinks that contain caffeine. · Do not eat foods that are high in fat.   · Avoid anti-inflammatory medicines such as aspirin, ibuprofen (Advil, Motrin), and naproxen (Aleve). These can cause stomach upset. Talk to your doctor if you take daily aspirin for another health problem. When should you call for help? Call 911 anytime you think you may need emergency care. For example, call if:  · You passed out (lost consciousness). · You pass maroon or very bloody stools. · You vomit blood or what looks like coffee grounds. · You have new, severe belly pain. Call your doctor now or seek immediate medical care if:  · Your pain gets worse, especially if it becomes focused in one area of your belly. · You have a new or higher fever. · Your stools are black and look like tar, or they have streaks of blood. · You have unexpected vaginal bleeding. · You have symptoms of a urinary tract infection. These may include:  ¨ Pain when you urinate. ¨ Urinating more often than usual.  ¨ Blood in your urine. · You are dizzy or lightheaded, or you feel like you may faint. Watch closely for changes in your health, and be sure to contact your doctor if:  · You are not getting better after 1 day (24 hours). Where can you learn more? Go to http://adele-gardenia.info/. Enter N061 in the search box to learn more about \"Abdominal Pain: Care Instructions. \"  Current as of: March 20, 2017  Content Version: 11.3  © 9317-5697 [employer]. Care instructions adapted under license by [employer] (which disclaims liability or warranty for this information). If you have questions about a medical condition or this instruction, always ask your healthcare professional. [place] [address] any warranty or liability for your use of this information.

## 2017-10-02 NOTE — PROGRESS NOTES
completed follow up visit with patient in room 2118 around 0935 this morning. Patient relates that she is doing OK at present.    Chaplains will continue to follow and will provide pastoral care on an as needed/requested basis    Chaplain Yosvany Gottlieb   Board Certified [address] [place] [address]   [phone]

## 2017-10-02 NOTE — DISCHARGE SUMMARY
T3U, TSH, TSHEXT       Imaging:      CT abdomen  1. Nonspecific gallbladder distention, without radiopaque gallstone. The common  bile duct diameter is upper limits of normal for age. No intrahepatic biliary  ductal dilatation. 2. Small hiatal hernia, unchanged. 3. Diverticulosis without evidence to suggest diverticulitis.               Consults: General Surgery    Treatment Team: Treatment Team: Attending Provider: Alba Molina MD; Consulting Provider: Stacey Wilson MD; Utilization Review: Praveen Weir RN; Consulting Provider: Aleksey Caruso MD; Consulting Provider: Kenyatta Mcdonald MD    Significant Diagnostic Studies: labs:   Recent Results (from the past 24 hour(s))   CBC WITH AUTOMATED DIFF    Collection Time: 10/02/17  5:06 AM   Result Value Ref Range    WBC 10.6 4.6 - 13.2 K/uL    RBC 3.42 (L) 4.20 - 5.30 M/uL    HGB 11.3 (L) 12.0 - 16.0 g/dL    HCT 33.2 (L) 35.0 - 45.0 %    MCV 97.1 (H) 74.0 - 97.0 FL    MCH 33.0 24.0 - 34.0 PG    MCHC 34.0 31.0 - 37.0 g/dL    RDW 12.9 11.6 - 14.5 %    PLATELET 864 924 - 685 K/uL    MPV 9.5 9.2 - 11.8 FL    NEUTROPHILS 65 40 - 73 %    LYMPHOCYTES 21 21 - 52 %    MONOCYTES 13 (H) 3 - 10 %    EOSINOPHILS 1 0 - 5 %    BASOPHILS 0 0 - 2 %    ABS. NEUTROPHILS 6.8 1.8 - 8.0 K/UL    ABS. LYMPHOCYTES 2.2 0.9 - 3.6 K/UL    ABS. MONOCYTES 1.4 (H) 0.05 - 1.2 K/UL    ABS. EOSINOPHILS 0.1 0.0 - 0.4 K/UL    ABS.  BASOPHILS 0.0 0.0 - 0.06 K/UL    DF AUTOMATED     METABOLIC PANEL, COMPREHENSIVE    Collection Time: 10/02/17  5:06 AM   Result Value Ref Range    Sodium 142 136 - 145 mmol/L    Potassium 3.5 3.5 - 5.5 mmol/L    Chloride 106 100 - 108 mmol/L    CO2 24 21 - 32 mmol/L    Anion gap 12 3.0 - 18 mmol/L    Glucose 84 74 - 99 mg/dL    BUN 9 7.0 - 18 MG/DL    Creatinine 0.66 0.6 - 1.3 MG/DL    BUN/Creatinine ratio 14 12 - 20      GFR est AA >60 >60 ml/min/1.73m2    GFR est non-AA >60 >60 ml/min/1.73m2    Calcium 8.6 8.5 - 10.1 MG/DL    Bilirubin, total 0.7 0.2 - 1.0 MG/DL ALT (SGPT) 98 (H) 13 - 56 U/L    AST (SGOT) 31 15 - 37 U/L    Alk. phosphatase 195 (H) 45 - 117 U/L    Protein, total 6.0 (L) 6.4 - 8.2 g/dL    Albumin 2.6 (L) 3.4 - 5.0 g/dL    Globulin 3.4 2.0 - 4.0 g/dL    A-G Ratio 0.8 0.8 - 1.7         Discharge diagnoses:    Problem List as of 10/2/2017  Date Reviewed: 9/30/2017          Codes Class Noted - Resolved    * (Principal)Intractable abdominal pain ICD-10-CM: R10.9  ICD-9-CM: 789.00  9/28/2017 - Present        Choledocholithiasis ICD-10-CM: K80.50  ICD-9-CM: 574.50  2/8/2017 - Present        Persistent vomiting ICD-10-CM: R11.10  ICD-9-CM: 536.2  2/8/2017 - Present        Elevated LFTs ICD-10-CM: R79.89  ICD-9-CM: 790.6  2/8/2017 - Present        Leukocytosis ICD-10-CM: D72.829  ICD-9-CM: 288.60  2/8/2017 - Present        Incontinence (Chronic) ICD-10-CM: R32  ICD-9-CM: 788.30  Unknown - Present        Hypertension (Chronic) ICD-10-CM: I10  ICD-9-CM: 401.9  Unknown - Present        Hyperlipidemia (Chronic) ICD-10-CM: E78.5  ICD-9-CM: 272.4  Unknown - Present        GERD (gastroesophageal reflux disease) (Chronic) ICD-10-CM: K21.9  ICD-9-CM: 530.81  Unknown - Present        PATRICIA (generalized anxiety disorder) (Chronic) ICD-10-CM: F41.1  ICD-9-CM: 300.02  Unknown - Present        CKD (chronic kidney disease), stage III (Chronic) ICD-10-CM: N18.3  ICD-9-CM: 982. 3  Unknown - Present        History of gastrointestinal hemorrhage (Chronic) ICD-10-CM: Z87.19  ICD-9-CM: V12.79  5/12/2010 - Present    Overview Signed 6/9/2016 11:49 AM by Gini Diego     Overview:   History of hemorragic gastritis diagnosed by EGD 1973             RESOLVED: Urinary incontinence ICD-10-CM: R32  ICD-9-CM: 788.30  8/11/2016 - 2/8/2017        RESOLVED: OAB (overactive bladder) ICD-10-CM: N32.81  ICD-9-CM: 596.51  Unknown - 2/8/2017        RESOLVED: Pruritus ICD-10-CM: L29.9  ICD-9-CM: 698.9  Unknown - 2/8/2017        RESOLVED: Hordeolum ICD-10-CM: I96.230  ICD-9-CM: 373.11  Unknown - 2/8/2017 RESOLVED: Gastritis with bleeding ICD-10-CM: K29.71  ICD-9-CM: 535.51  Unknown - 2/8/2017        RESOLVED: Gastritis ICD-10-CM: K29.70  ICD-9-CM: 535.50  Unknown - 2/8/2017        RESOLVED: Dyspnea ICD-10-CM: R06.00  ICD-9-CM: 786.09  Unknown - 2/8/2017        RESOLVED: Dyslipidemia ICD-10-CM: E78.5  ICD-9-CM: 272.4  Unknown - 2/8/2017        RESOLVED: Chest pain ICD-10-CM: R07.9  ICD-9-CM: 786.50  Unknown - 2/8/2017        RESOLVED: Cervicalgia ICD-10-CM: M54.2  ICD-9-CM: 723.1  Unknown - 2/8/2017        RESOLVED: Cervical spondylosis with radiculopathy ICD-10-CM: M47.22  ICD-9-CM: 721.0  Unknown - 2/8/2017        RESOLVED: Acute neck sprain ICD-10-CM: S13. 9XXA  ICD-9-CM: 847.0  Unknown - 2/8/2017        RESOLVED: Abnormal LFTs ICD-10-CM: R79.89  ICD-9-CM: 790.6  Unknown - 2/8/2017        RESOLVED: Rectal hemorrhage ICD-10-CM: K62.5  ICD-9-CM: 569.3  3/21/2015 - 2/8/2017        RESOLVED: Gastrointestinal hemorrhage ICD-10-CM: K92.2  ICD-9-CM: 578.9  3/21/2015 - 2/8/2017        RESOLVED: Osteoarthritis ICD-10-CM: M19.90  ICD-9-CM: 715.90  3/21/2015 - 2/8/2017        RESOLVED: Hematochezia ICD-10-CM: K92.1  ICD-9-CM: 578.1  5/12/2010 - 2/8/2017        RESOLVED: Bursitis of shoulder ICD-10-CM: M75.50  ICD-9-CM: 726.10  5/12/2010 - 2/8/2017        RESOLVED: Gastroesophageal reflux disease ICD-10-CM: K21.9  ICD-9-CM: 530.81  5/12/2010 - 2/8/2017        RESOLVED: Weight loss ICD-10-CM: R63.4  ICD-9-CM: 783.21  5/12/2010 - 2/8/2017            Hospital Course:   Major issues addressed during hospitalization outlined  below. Luis Ma is a 68 y.o. female with PMHX of gallstones who presents with 5 days of worsening abdominal pain with nasuea, vomitting, and some diarrhea. No report of rectal bleeding when I talked with patient and family. She has a history of some gallstones. Her pain is mid abdomen to mid R abdomen and radiates posteriorly. She does not guard and is not exquisitely tender.  Bowel sounds hyper. Pain is chronic and does not come in waves. She has never had this kind of pain before. ROS + for chills. No fevers. On exam patient is in visible pain and in contorted position on back to help. RUQ US in ER with non-specific GB findings. Her pain is lower central and slightly right abdomen, negative murphys sign. RUQ US and CT abdomen only reveal some non specific gall bladder pathology. Labs reveal a transamnitis. Her pain resided overnight with IV morphine and zofran. GI consulted. She underwent ERCP 9/29 with findings of GB stones and sludge and possible infection suggesting all her pathology was from the gall bladder. Zosyn started 9/29. Surgery consulted and plan cholecystectomy 9/31. Zosyn stopped 9/30 with no signs of infection. Patient underwent Lap Mireille 10/1 with Dr Rodrigo Godoy. Patient was stable able to eat solid food on 10/2. F/u with pcp in 1 week, surgery in 2 weeks.     Maurice Yousif MD  October 2, 2017        Total time spent 35 minutes

## 2017-10-02 NOTE — ROUTINE PROCESS
Bedside and Verbal shift change report given to MAURO Reinoso (oncoming nurse) by AMNA SOUSA RN (offgoing nurse). Report included the following information SBAR, Kardex and MAR. Patient resting comfortably in chair.

## 2017-10-05 LAB
BACTERIA SPEC CULT: NORMAL
BACTERIA SPEC CULT: NORMAL
SERVICE CMNT-IMP: NORMAL
SERVICE CMNT-IMP: NORMAL

## 2017-10-19 ENCOUNTER — OFFICE VISIT (OUTPATIENT)
Dept: SURGERY | Age: 77
End: 2017-10-19

## 2017-10-19 VITALS
SYSTOLIC BLOOD PRESSURE: 149 MMHG | DIASTOLIC BLOOD PRESSURE: 90 MMHG | WEIGHT: 139 LBS | HEART RATE: 82 BPM | RESPIRATION RATE: 20 BRPM | HEIGHT: 63 IN | BODY MASS INDEX: 24.63 KG/M2 | TEMPERATURE: 97 F

## 2017-10-19 DIAGNOSIS — K81.2 ACUTE ON CHRONIC CHOLECYSTITIS: Primary | ICD-10-CM

## 2017-10-19 NOTE — MR AVS SNAPSHOT
Visit Information Date & Time Provider Department Dept. Phone Encounter #  
 10/19/2017 11:15 AM Evelyn Gibbs MD [facility] [phone] [id number] Upcoming Health Maintenance Date Due DTaP/Tdap/Td series (1 - Tdap) 5/18/1961 ZOSTER VACCINE AGE 60> 3/18/2000 GLAUCOMA SCREENING Q2Y 5/18/2005 MEDICARE YEARLY EXAM 5/18/2005 Pneumococcal 65+ High/Highest Risk (2 of 2 - PCV13) 10/12/2010 INFLUENZA AGE 9 TO ADULT 8/1/2017 Allergies as of 10/19/2017  Review Complete On: 10/1/2017 By: Evelyn Gibbs MD  
  
 Severity Noted Reaction Type Reactions Codeine High 07/14/2015    Other (comments) Chills, Loss of consciousness Norco [Hydrocodone-acetaminophen]  06/06/2016    Nausea Only Tylox [Oxycodone-acetaminophen]  09/28/2017    Nausea Only Vistaril [Hydroxyzine Pamoate]  06/06/2016    Vertigo Current Immunizations  Reviewed on 9/29/2017 Name Date Influenza Vaccine  Deferred (Patient Refused), 10/12/2009 12:00 AM  
 Pneumococcal Polysaccharide (PPSV-23) 10/12/2009 12:00 AM  
  
 Not reviewed this visit You Were Diagnosed With   
  
 Codes Comments Acute on chronic cholecystitis    -  Primary ICD-10-CM: K81.2 ICD-9-CM: 575.12 Vitals BP Pulse Temp Resp Height(growth percentile) Weight(growth percentile) 149/90 (BP 1 Location: Left arm, BP Patient Position: At rest) 82 97 °F (36.1 °C) (Oral) 20 5' 3\" (1.6 m) 139 lb (63 kg) BMI OB Status Smoking Status 24.62 kg/m2 Postmenopausal Former Smoker BMI and BSA Data Body Mass Index Body Surface Area  
 24.62 kg/m 2 1.67 m 2 Preferred Pharmacy Pharmacy Name Phone [facility] [phone] Your Updated Medication List  
  
   
This list is accurate as of: 10/19/17 12:31 PM.  Always use your most recent med list.  
  
  
  
  
 esomeprazole 40 mg capsule Commonly known as:  Adonay Shepard Take 40 mg by mouth two (2) times a day. multivitamin with iron tablet Take 1 Tab by mouth daily. To-Do List   
 01/16/2018 12:00 PM  
  Appointment with [facility] at [facility] ([phone]) OUTSIDE FILMS  - Any outside films related to the study being scheduled should be brought with you on the day of the exam.  If this cannot be done there may be a delay in the reading of the study. MEDICATIONS  - Patient must bring a complete list of all medications currently taking to include prescriptions, over-the-counter meds, herbals, vitamins & any dietary supplements  GENERAL INSTRUCTIONS  - On the day of your exam do not use any bath powder, deodorant or lotions on the armpit area. -Tenderness of breasts may cause an increase of discomfort during procedure. If you are experiencing breast tenderness on the day of your appointment and would like to reschedule, please call [phone]. Introducing [facility]! [employer] introduces [employer] patient portal. Now you can access parts of your medical record, email your doctor's office, and request medication refills online. 1. In your internet browser, go to [email or web address] 2. Click on the First Time User? Click Here link in the Sign In box. You will see the New Member Sign Up page. 3. Enter your [employer] Access Code exactly as it appears below. You will not need to use this code after youve completed the sign-up process. If you do not sign up before the expiration date, you must request a new code. · [employer] Access Code: [email or web address] Expires: 12/31/2017 10:13 AM 
 
4. Enter the last four digits of your Social Security Number (xxxx) and Date of Birth (mm/dd/yyyy) as indicated and click Submit. You will be taken to the next sign-up page. 5. Create a [employer] ID.  This will be your [employer] login ID and cannot be changed, so think of one that is secure and easy to remember. 6. Create a [employer] password. You can change your password at any time. 7. Enter your Password Reset Question and Answer. This can be used at a later time if you forget your password. 8. Enter your e-mail address. You will receive e-mail notification when new information is available in [address] 9. Click Sign Up. You can now view and download portions of your medical record. 10. Click the Download Summary menu link to download a portable copy of your medical information. If you have questions, please visit the Frequently Asked Questions section of the [employer] website. Remember, [employer] is NOT to be used for urgent needs. For medical emergencies, dial 911. Now available from your iPhone and Android! Please provide this summary of care documentation to your next provider. Your primary care clinician is listed as Socrates Barrett. If you have any questions after today's visit, please call [phone].

## 2017-10-19 NOTE — PROGRESS NOTES
Subjective:      Magaly Barba is a 68 y.o. female presents for postop care 2 weeks following laparoscopic cholecystectomy. Appetite is good. Eating a regular diet. without difficulty. Bowel movements are regular. The patient is not having any pain. Pathology:  Acute on Chronic cholecystitis without cholelithiasis    Objective:     Visit Vitals    /90 (BP 1 Location: Left arm, BP Patient Position: At rest)    Pulse 82    Temp 97 °F (36.1 °C) (Oral)    Resp 20    Ht 5' 3\" (1.6 m)    Wt 63 kg (139 lb)    BMI 24.62 kg/m2       General:  alert, cooperative, no distress, appears stated age   Abdomen: soft, non-tender   Incision:   healing well, no drainage, no erythema, no hernia, no seroma, no swelling, no dehiscence, incision well approximated     Assessment:     1. Acute on Chronic cholecystitis without cholelithiasis, status post lap ladonna. Doing well postoperatively. Plan:     1. Pt is to increase activities as tolerated. Return to full duty in immediately. .  2.  followup prn

## 2017-10-19 NOTE — PROGRESS NOTES
Cj Montero is a 68 y.o. female who presents today with       1. Have you been to the ER, urgent care clinic since your last visit? Hospitalized since your last visit? No    2. Have you seen or consulted any other health care providers outside of the [address] [place] [address] since your last visit? Include any pap smears or colon screening.  No

## 2018-05-09 ENCOUNTER — HOSPITAL ENCOUNTER (OUTPATIENT)
Dept: MAMMOGRAPHY | Age: 78
Discharge: HOME | End: 2018-05-09
Attending: NURSE PRACTITIONER
Payer: MEDICARE

## 2018-05-09 DIAGNOSIS — Z12.31 ENCOUNTER FOR SCREENING MAMMOGRAM FOR HIGH-RISK PATIENT: ICD-10-CM

## 2018-05-09 PROCEDURE — 77063 BREAST TOMOSYNTHESIS BI: CPT

## 2019-03-12 NOTE — PROGRESS NOTES
Gastrointestinal Progress Note    Patient Name: Kendall JENKINS Date: 9/30/2017    Admit Date: 9/28/2017    Assessment-Recommendation:   Improving liver enzymes - Please consult general surgery for GB removal to prevent recurrent episodes. Subjective:     Doing well. Current Facility-Administered Medications   Medication Dose Route Frequency    piperacillin-tazobactam (ZOSYN) 3.375 g in 0.9% sodium chloride (MBP/ADV) 100 mL MBP EXTENDED 4 HOUR INFUSION###  3.375 g IntraVENous Q8H    ioversol (OPTIRAY) 320 mg iodine/mL contrast injection    PRN    pantoprazole (PROTONIX) tablet 40 mg  40 mg Oral DAILY AFTER BREAKFAST    0.9% sodium chloride infusion  125 mL/hr IntraVENous CONTINUOUS    morphine injection 5 mg  5 mg IntraVENous Q4H PRN    ondansetron (ZOFRAN) injection 4 mg  4 mg IntraVENous Q4H PRN    enoxaparin (LOVENOX) injection 40 mg  40 mg SubCUTAneous Q24H          Objective:     Physical Exam:    NAD  OP clear   MMM  CV RRR  Abdomen soft NT ND    Data Review:    Labs: Results:       Chemistry Recent Labs      09/30/17   0530 09/29/17   0406  09/28/17   1432   GLU  83  85  92   NA  140  140  139   K  3.2*  3.4*  3.5   CL  106  107  107   CO2  25  25  24   BUN  13  13  13   CREA  0.77  0.85  0.85   CA  8.1*  8.1*  8.3*   AGAP  9  8  8   BUCR  17  15  15   AP  259*  303*  324*   TP  5.8*  5.9*  6.7   ALB  2.8*  2.9*  3.2*   GLOB  3.0  3.0  3.5   AGRAT  0.9  1.0  0.9      CBC w/Diff Recent Labs      09/30/17   0530 09/29/17   0406  09/28/17   1432   WBC  9.3  10.9  10.7   RBC  3.47*  3.66*  3.95*   HGB  11.4*  11.9*  13.1   HCT  33.9*  36.4  38.3   PLT  251  257  261   GRANS   --    --   72   LYMPH   --    --   11*   EOS   --    --   4      Coagulation No results for input(s): PTP, INR, APTT in the last 72 hours.     No lab exists for component: INREXT    Liver Enzymes Recent Labs      09/30/17   0530   TP  5.8*   ALB  2.8*   AP  259*   SGOT  42*   ALT  165*          Joe Woo, What Type Of Note Output Would You Prefer (Optional)?: Standard Output Hpi Title: Evaluation of Skin Lesions MD  September 30, 2017 Have Your Spot(S) Been Treated In The Past?: has not been treated

## 2019-07-21 ENCOUNTER — HOSPITAL ENCOUNTER (EMERGENCY)
Age: 79
Discharge: HOME | End: 2019-07-22
Attending: EMERGENCY MEDICINE
Payer: MEDICARE

## 2019-07-21 ENCOUNTER — APPOINTMENT (OUTPATIENT)
Dept: GENERAL RADIOLOGY | Age: 79
End: 2019-07-21
Attending: EMERGENCY MEDICINE
Payer: MEDICARE

## 2019-07-21 DIAGNOSIS — R11.2 NON-INTRACTABLE VOMITING WITH NAUSEA, UNSPECIFIED VOMITING TYPE: ICD-10-CM

## 2019-07-21 DIAGNOSIS — R07.9 ACUTE CHEST PAIN: Primary | ICD-10-CM

## 2019-07-21 DIAGNOSIS — I10 HYPERTENSION, ACCELERATED: ICD-10-CM

## 2019-07-21 DIAGNOSIS — R10.13 ABDOMINAL PAIN, ACUTE, EPIGASTRIC: ICD-10-CM

## 2019-07-21 LAB
ANION GAP SERPL CALC-SCNC: 9 MMOL/L (ref 3–18)
BASOPHILS # BLD: 0 K/UL (ref 0–0.1)
BASOPHILS NFR BLD: 0 % (ref 0–2)
BNP SERPL-MCNC: 345 PG/ML (ref 0–1800)
BUN SERPL-MCNC: 25 MG/DL (ref 7–18)
BUN/CREAT SERPL: 20 (ref 12–20)
CALCIUM SERPL-MCNC: 8.5 MG/DL (ref 8.5–10.1)
CHLORIDE SERPL-SCNC: 106 MMOL/L (ref 100–111)
CO2 SERPL-SCNC: 24 MMOL/L (ref 21–32)
CREAT SERPL-MCNC: 1.27 MG/DL (ref 0.6–1.3)
DIFFERENTIAL METHOD BLD: ABNORMAL
EOSINOPHIL # BLD: 0.3 K/UL (ref 0–0.4)
EOSINOPHIL NFR BLD: 3 % (ref 0–5)
ERYTHROCYTE [DISTWIDTH] IN BLOOD BY AUTOMATED COUNT: 12.5 % (ref 11.6–14.5)
GLUCOSE SERPL-MCNC: 113 MG/DL (ref 74–99)
HCT VFR BLD AUTO: 39.1 % (ref 35–45)
HGB BLD-MCNC: 13 G/DL (ref 12–16)
LYMPHOCYTES # BLD: 3.1 K/UL (ref 0.9–3.6)
LYMPHOCYTES NFR BLD: 27 % (ref 21–52)
MCH RBC QN AUTO: 32.9 PG (ref 24–34)
MCHC RBC AUTO-ENTMCNC: 33.2 G/DL (ref 31–37)
MCV RBC AUTO: 99 FL (ref 74–97)
MONOCYTES # BLD: 1.5 K/UL (ref 0.05–1.2)
MONOCYTES NFR BLD: 13 % (ref 3–10)
NEUTS SEG # BLD: 6.5 K/UL (ref 1.8–8)
NEUTS SEG NFR BLD: 57 % (ref 40–73)
PLATELET # BLD AUTO: 267 K/UL (ref 135–420)
PMV BLD AUTO: 9.2 FL (ref 9.2–11.8)
POTASSIUM SERPL-SCNC: 3.4 MMOL/L (ref 3.5–5.5)
RBC # BLD AUTO: 3.95 M/UL (ref 4.2–5.3)
SODIUM SERPL-SCNC: 139 MMOL/L (ref 136–145)
TROPONIN I SERPL-MCNC: <0.02 NG/ML (ref 0–0.04)
WBC # BLD AUTO: 11.4 K/UL (ref 4.6–13.2)

## 2019-07-21 PROCEDURE — 94762 N-INVAS EAR/PLS OXIMTRY CONT: CPT

## 2019-07-21 PROCEDURE — 84484 ASSAY OF TROPONIN QUANT: CPT

## 2019-07-21 PROCEDURE — 93005 ELECTROCARDIOGRAM TRACING: CPT

## 2019-07-21 PROCEDURE — 83880 ASSAY OF NATRIURETIC PEPTIDE: CPT

## 2019-07-21 PROCEDURE — 85025 COMPLETE CBC W/AUTO DIFF WBC: CPT

## 2019-07-21 PROCEDURE — 71045 X-RAY EXAM CHEST 1 VIEW: CPT

## 2019-07-21 PROCEDURE — 99284 EMERGENCY DEPT VISIT MOD MDM: CPT

## 2019-07-21 PROCEDURE — 80048 BASIC METABOLIC PNL TOTAL CA: CPT

## 2019-07-21 RX ORDER — ONDANSETRON 2 MG/ML
4 INJECTION INTRAMUSCULAR; INTRAVENOUS
Status: DISCONTINUED | OUTPATIENT
Start: 2019-07-21 | End: 2019-07-22 | Stop reason: HOSPADM

## 2019-07-22 VITALS
WEIGHT: 139 LBS | BODY MASS INDEX: 24.63 KG/M2 | OXYGEN SATURATION: 100 % | DIASTOLIC BLOOD PRESSURE: 67 MMHG | TEMPERATURE: 97.1 F | HEART RATE: 75 BPM | SYSTOLIC BLOOD PRESSURE: 157 MMHG | HEIGHT: 63 IN | RESPIRATION RATE: 16 BRPM

## 2019-07-22 LAB
ATRIAL RATE: 63 BPM
ATRIAL RATE: 76 BPM
CALCULATED P AXIS, ECG09: 55 DEGREES
CALCULATED P AXIS, ECG09: 60 DEGREES
CALCULATED R AXIS, ECG10: 1 DEGREES
CALCULATED R AXIS, ECG10: 26 DEGREES
CALCULATED T AXIS, ECG11: 48 DEGREES
CALCULATED T AXIS, ECG11: 61 DEGREES
DIAGNOSIS, 93000: NORMAL
DIAGNOSIS, 93000: NORMAL
P-R INTERVAL, ECG05: 192 MS
P-R INTERVAL, ECG05: 198 MS
Q-T INTERVAL, ECG07: 410 MS
Q-T INTERVAL, ECG07: 444 MS
QRS DURATION, ECG06: 72 MS
QRS DURATION, ECG06: 76 MS
QTC CALCULATION (BEZET), ECG08: 454 MS
QTC CALCULATION (BEZET), ECG08: 461 MS
TROPONIN I SERPL-MCNC: <0.02 NG/ML (ref 0–0.04)
VENTRICULAR RATE, ECG03: 63 BPM
VENTRICULAR RATE, ECG03: 76 BPM

## 2019-07-22 PROCEDURE — 84484 ASSAY OF TROPONIN QUANT: CPT

## 2019-07-22 PROCEDURE — 93005 ELECTROCARDIOGRAM TRACING: CPT

## 2019-07-22 NOTE — ED NOTES
0030  07/22/19     Discharge instructions given to patient (name) with verbalization of understanding. Patient accompanied by daughter. Patient discharged with the following prescriptions none. Patient discharged to home (destination).       Bard Maria T RN

## 2019-07-22 NOTE — ED PROVIDER NOTES
Paulo Jones is a 78 y.o. Female with no known cardiac issues with complaints of abrupt onset of severe pain and pressure in the lower center of her chest that seem to radiate to her back and arms with vomiting and diaphoresis. This started around 8:00 and is slowly gotten better. Patient had no hematemesis. There is no diarrhea. There is been no blood in her stool. He has no melena. He did not have a syncopal event. Patient felt otherwise well prior to this event. She has had no recent exertional symptoms or leg pain or swelling. Patient has no known cardiac issues in the past with multiple negative stress test.    The history is provided by the patient and medical records.         Past Medical History:   Diagnosis Date    Abnormal LFTs     Acute neck sprain     Bursitis     right shoulder    Cervicalgia     Chest pain     CKD (chronic kidney disease), stage III (HCC)     Dyslipidemia     Dyspnea     PATRICIA (generalized anxiety disorder)     Gallstones     Gastritis     Gastritis with bleeding     GERD (gastroesophageal reflux disease)     Hordeolum     Hyperlipidemia     Hypertension     Incontinence     Insomnia     Inverted nipple always    bilateral    OAB (overactive bladder)     Pruritus        Past Surgical History:   Procedure Laterality Date    APPENDECTOMY      HX APPENDECTOMY      HX BREAST BIOPSY Right 30 + yrs ago    x2 benign    HX CHOLECYSTECTOMY      HX COLONOSCOPY      HX ENDOSCOPY      HX HYSTERECTOMY           Family History:   Problem Relation Age of Onset    No Known Problems Brother     Breast Cancer Daughter 50       Social History     Socioeconomic History    Marital status:      Spouse name: Not on file    Number of children: Not on file    Years of education: Not on file    Highest education level: Not on file   Occupational History    Not on file   Social Needs    Financial resource strain: Not on file    Food insecurity:     Worry: Not on file     Inability: Not on file    Transportation needs:     Medical: Not on file     Non-medical: Not on file   Tobacco Use    Smoking status: Former Smoker     Last attempt to quit: 1980     Years since quittin.5    Smokeless tobacco: Never Used   Substance and Sexual Activity    Alcohol use: Yes     Comment: occassionally    Drug use: No    Sexual activity: Not on file   Lifestyle    Physical activity:     Days per week: Not on file     Minutes per session: Not on file    Stress: Not on file   Relationships    Social connections:     Talks on phone: Not on file     Gets together: Not on file     Attends Jew service: Not on file     Active member of club or organization: Not on file     Attends meetings of clubs or organizations: Not on file     Relationship status: Not on file    Intimate partner violence:     Fear of current or ex partner: Not on file     Emotionally abused: Not on file     Physically abused: Not on file     Forced sexual activity: Not on file   Other Topics Concern    Not on file   Social History Narrative    Not on file         ALLERGIES: Codeine; Norco [hydrocodone-acetaminophen]; Tylox [oxycodone-acetaminophen]; and Vistaril [hydroxyzine pamoate]    Review of Systems   Constitutional: Positive for diaphoresis. Negative for fever. HENT: Negative for sore throat and trouble swallowing. Eyes: Negative for visual disturbance. Respiratory: Negative for cough and shortness of breath. Cardiovascular: Positive for chest pain. Gastrointestinal: Positive for abdominal pain. Endocrine: Negative for polyuria. Genitourinary: Negative for difficulty urinating. Musculoskeletal: Negative for gait problem. Skin: Negative for rash. Allergic/Immunologic: Negative for immunocompromised state. Neurological: Negative for syncope. Psychiatric/Behavioral: Positive for sleep disturbance.        Vitals:    19 2053 19   BP: 182/83 (!) 180/93 Pulse: 73    Resp: 16    Temp: 97.1 °F (36.2 °C)    SpO2: 100%    Weight: 63 kg (139 lb)    Height: 5' 3\" (1.6 m)             Physical Exam   Constitutional: She is oriented to person, place, and time. She appears well-developed and well-nourished. Non-toxic appearance. She does not have a sickly appearance. She does not appear ill. No distress. HENT:   Head: Normocephalic and atraumatic. Right Ear: External ear normal.   Left Ear: External ear normal.   Nose: Nose normal.   Mouth/Throat: Uvula is midline, oropharynx is clear and moist and mucous membranes are normal. No oropharyngeal exudate. Eyes: Conjunctivae are normal. No scleral icterus. Neck: Neck supple. Cardiovascular: Normal rate, regular rhythm, normal heart sounds and intact distal pulses. Pulmonary/Chest: Effort normal and breath sounds normal.   Abdominal: Soft. Normal appearance. She exhibits no distension and no mass. There is no hepatosplenomegaly. There is no tenderness. There is no rigidity, no rebound, no guarding, no CVA tenderness, no tenderness at McBurney's point and negative Garvey's sign. Musculoskeletal: She exhibits no edema. Neurological: She is alert and oriented to person, place, and time. Gait normal.   Skin: Skin is warm and dry. She is not diaphoretic. Psychiatric: Her behavior is normal.   Nursing note and vitals reviewed.        MDM       Procedures  Vitals:  Patient Vitals for the past 12 hrs:   Temp Pulse Resp BP SpO2   07/21/19 2055    (!) 180/93    07/21/19 2053 97.1 °F (36.2 °C) 73 16 182/83 100 %         Medications ordered:   Medications   ondansetron (ZOFRAN) injection 4 mg (4 mg IntraVENous Refused 7/21/19 2106)         Lab findings:  Recent Results (from the past 12 hour(s))   EKG, 12 LEAD, INITIAL    Collection Time: 07/21/19  8:51 PM   Result Value Ref Range    Ventricular Rate 76 BPM    Atrial Rate 76 BPM    P-R Interval 198 ms    QRS Duration 76 ms    Q-T Interval 410 ms    QTC Calculation (Bezet) 461 ms    Calculated P Axis 60 degrees    Calculated R Axis 1 degrees    Calculated T Axis 48 degrees    Diagnosis       Sinus rhythm with occasional premature ventricular complexes  Inferior infarct (cited on or before 28-SEP-2017)  Abnormal ECG  When compared with ECG of 28-SEP-2017 14:40,  premature ventricular complexes are now present     CBC WITH AUTOMATED DIFF    Collection Time: 07/21/19  9:08 PM   Result Value Ref Range    WBC 11.4 4.6 - 13.2 K/uL    RBC 3.95 (L) 4.20 - 5.30 M/uL    HGB 13.0 12.0 - 16.0 g/dL    HCT 39.1 35.0 - 45.0 %    MCV 99.0 (H) 74.0 - 97.0 FL    MCH 32.9 24.0 - 34.0 PG    MCHC 33.2 31.0 - 37.0 g/dL    RDW 12.5 11.6 - 14.5 %    PLATELET 930 012 - 607 K/uL    MPV 9.2 9.2 - 11.8 FL    NEUTROPHILS 57 40 - 73 %    LYMPHOCYTES 27 21 - 52 %    MONOCYTES 13 (H) 3 - 10 %    EOSINOPHILS 3 0 - 5 %    BASOPHILS 0 0 - 2 %    ABS. NEUTROPHILS 6.5 1.8 - 8.0 K/UL    ABS. LYMPHOCYTES 3.1 0.9 - 3.6 K/UL    ABS. MONOCYTES 1.5 (H) 0.05 - 1.2 K/UL    ABS. EOSINOPHILS 0.3 0.0 - 0.4 K/UL    ABS.  BASOPHILS 0.0 0.0 - 0.1 K/UL    DF AUTOMATED     METABOLIC PANEL, BASIC    Collection Time: 07/21/19  9:08 PM   Result Value Ref Range    Sodium 139 136 - 145 mmol/L    Potassium 3.4 (L) 3.5 - 5.5 mmol/L    Chloride 106 100 - 111 mmol/L    CO2 24 21 - 32 mmol/L    Anion gap 9 3.0 - 18 mmol/L    Glucose 113 (H) 74 - 99 mg/dL    BUN 25 (H) 7.0 - 18 MG/DL    Creatinine 1.27 0.6 - 1.3 MG/DL    BUN/Creatinine ratio 20 12 - 20      GFR est AA 49 (L) >60 ml/min/1.73m2    GFR est non-AA 41 (L) >60 ml/min/1.73m2    Calcium 8.5 8.5 - 10.1 MG/DL   NT-PRO BNP    Collection Time: 07/21/19  9:08 PM   Result Value Ref Range    NT pro- 0 - 1,800 PG/ML   TROPONIN I    Collection Time: 07/21/19  9:08 PM   Result Value Ref Range    Troponin-I, QT <0.02 0.0 - 0.045 NG/ML   TROPONIN I    Collection Time: 07/22/19 12:03 AM   Result Value Ref Range    Troponin-I, QT <0.02 0.0 - 0.045 NG/ML   EKG, 12 LEAD, SUBSEQUENT Collection Time: 07/22/19 12:06 AM   Result Value Ref Range    Ventricular Rate 63 BPM    Atrial Rate 63 BPM    P-R Interval 192 ms    QRS Duration 72 ms    Q-T Interval 444 ms    QTC Calculation (Bezet) 454 ms    Calculated P Axis 55 degrees    Calculated R Axis 26 degrees    Calculated T Axis 61 degrees    Diagnosis       Normal sinus rhythm  Normal ECG  When compared with ECG of 21-JUL-2019 20:51,  premature ventricular complexes are no longer present  Criteria for Inferior infarct are no longer present         EKG interpretation by ED Physician:  Normal sinus rhythm with occasional PVC. No acute ST or T wave changes  Rate 76 QRS 76   Not significantly changed from previous    Cardiac monitor: Normal rate with regular rhythm and no ectopy  Pulse ox: 100% room air    Repeat: Sinus rhythm with no acute ST or T wave changes  Normal EKG  Rate 63 QRS 72   No significant changes from the initial one    X-Ray, CT or other radiology findings or impressions:  XR CHEST PORT    (Results Pending)   No acute process    Progress notes, Consult notes or additional Procedure notes:   Suspect this was more of a GI related issue than cardiac, vascular. Patient is feeling better during her stay here. There is been no significant recurrent pain. She has negative serial troponins and unremarkable repeat EKG    I have discussed with patient and/or family/sig other the results, interpretation of any imaging if performed, suspected diagnosis and treatment plan to include instructions regarding the diagnoses listed to which understanding was expressed with all questions answered      Reevaluation of patient:   stable    Disposition:  Diagnosis:   1. Acute chest pain    2. Abdominal pain, acute, epigastric    3. Non-intractable vomiting with nausea, unspecified vomiting type    4.  Hypertension, accelerated        Disposition: home    Follow-up Information     Follow up With Specialties Details Why [address], Lana Mims MD Nephrology Schedule an appointment as soon as possible for a visit this week for recheck [address] [address] [phone]      [facility] EMERGENCY DEPT Emergency Medicine  If symptoms worsen [address]  [phone]            Patient's Medications   Start Taking    No medications on file   Continue Taking    ESOMEPRAZOLE (NEXIUM) 40 MG CAPSULE    Take 40 mg by mouth two (2) times a day. MULTIVITAMIN WITH IRON TABLET    Take 1 Tab by mouth daily.    These Medications have changed    No medications on file   Stop Taking    No medications on file

## 2019-07-22 NOTE — ED TRIAGE NOTES
Pt ambulatory into triage, seems anxious, for c/o midsternal chest pain that began at 2001 while she was sitting down eating dinner, with nausea and vomiting. Also c/o slight SOB.

## 2019-07-22 NOTE — DISCHARGE INSTRUCTIONS
Patient Education        Abdominal Pain: Care Instructions  Your Care Instructions    Abdominal pain has many possible causes. Some aren't serious and get better on their own in a few days. Others need more testing and treatment. If your pain continues or gets worse, you need to be rechecked and may need more tests to find out what is wrong. You may need surgery to correct the problem. Don't ignore new symptoms, such as fever, nausea and vomiting, urination problems, pain that gets worse, and dizziness. These may be signs of a more serious problem. Your doctor may have recommended a follow-up visit in the next 8 to 12 hours. If you are not getting better, you may need more tests or treatment. The doctor has checked you carefully, but problems can develop later. If you notice any problems or new symptoms, get medical treatment right away. Follow-up care is a key part of your treatment and safety. Be sure to make and go to all appointments, and call your doctor if you are having problems. It's also a good idea to know your test results and keep a list of the medicines you take. How can you care for yourself at home? · Rest until you feel better. · To prevent dehydration, drink plenty of fluids, enough so that your urine is light yellow or clear like water. Choose water and other caffeine-free clear liquids until you feel better. If you have kidney, heart, or liver disease and have to limit fluids, talk with your doctor before you increase the amount of fluids you drink. · If your stomach is upset, eat mild foods, such as rice, dry toast or crackers, bananas, and applesauce. Try eating several small meals instead of two or three large ones. · Wait until 48 hours after all symptoms have gone away before you have spicy foods, alcohol, and drinks that contain caffeine. · Do not eat foods that are high in fat. · Avoid anti-inflammatory medicines such as aspirin, ibuprofen (Advil, Motrin), and naproxen (Aleve). These can cause stomach upset. Talk to your doctor if you take daily aspirin for another health problem. When should you call for help? Call 911 anytime you think you may need emergency care. For example, call if:    · You passed out (lost consciousness).     · You pass maroon or very bloody stools.     · You vomit blood or what looks like coffee grounds.     · You have new, severe belly pain.    Call your doctor now or seek immediate medical care if:    · Your pain gets worse, especially if it becomes focused in one area of your belly.     · You have a new or higher fever.     · Your stools are black and look like tar, or they have streaks of blood.     · You have unexpected vaginal bleeding.     · You have symptoms of a urinary tract infection. These may include:  ? Pain when you urinate. ? Urinating more often than usual.  ? Blood in your urine.     · You are dizzy or lightheaded, or you feel like you may faint.    Watch closely for changes in your health, and be sure to contact your doctor if:    · You are not getting better after 1 day (24 hours). Where can you learn more? Go to http://adeleAudicusgardenia.info/. Enter X499 in the search box to learn more about \"Abdominal Pain: Care Instructions. \"  Current as of: September 23, 2018  Content Version: 12.1  © 7197-3851 [employer]. Care instructions adapted under license by [employer] (which disclaims liability or warranty for this information). If you have questions about a medical condition or this instruction, always ask your healthcare professional. [place] [address] any warranty or liability for your use of this information. Patient Education        Chest Pain: Care Instructions  Your Care Instructions    There are many things that can cause chest pain. Some are not serious and will get better on their own in a few days. But some kinds of chest pain need more testing and treatment.  Your doctor may have recommended a follow-up visit in the next 8 to 12 hours. If you are not getting better, you may need more tests or treatment. Even though your doctor has released you, you still need to watch for any problems. The doctor carefully checked you, but sometimes problems can develop later. If you have new symptoms or if your symptoms do not get better, get medical care right away. If you have worse or different chest pain or pressure that lasts more than 5 minutes or you passed out (lost consciousness), call 911 or seek other emergency help right away. A medical visit is only one step in your treatment. Even if you feel better, you still need to do what your doctor recommends, such as going to all suggested follow-up appointments and taking medicines exactly as directed. This will help you recover and help prevent future problems. How can you care for yourself at home? · Rest until you feel better. · Take your medicine exactly as prescribed. Call your doctor if you think you are having a problem with your medicine. · Do not drive after taking a prescription pain medicine. When should you call for help? Call 911 if:    · You passed out (lost consciousness).     · You have severe difficulty breathing.     · You have symptoms of a heart attack. These may include:  ? Chest pain or pressure, or a strange feeling in your chest.  ? Sweating. ? Shortness of breath. ? Nausea or vomiting. ? Pain, pressure, or a strange feeling in your back, neck, jaw, or upper belly or in one or both shoulders or arms. ? Lightheadedness or sudden weakness. ? A fast or irregular heartbeat. After you call 911, the  may tell you to chew 1 adult-strength or 2 to 4 low-dose aspirin. Wait for an ambulance.  Do not try to drive yourself.    Call your doctor today if:    · You have any trouble breathing.     · Your chest pain gets worse.     · You are dizzy or lightheaded, or you feel like you may faint.     · You are not getting better as expected.     · You are having new or different chest pain. Where can you learn more? Go to http://adele-gardenia.info/. Enter A120 in the search box to learn more about \"Chest Pain: Care Instructions. \"  Current as of: September 23, 2018  Content Version: 12.1  © 5074-1774 [employer]. Care instructions adapted under license by [employer] (which disclaims liability or warranty for this information). If you have questions about a medical condition or this instruction, always ask your healthcare professional. [address] any warranty or liability for your use of this information.

## 2019-08-02 ENCOUNTER — APPOINTMENT (OUTPATIENT)
Dept: GENERAL RADIOLOGY | Age: 79
DRG: 446 | End: 2019-08-02
Attending: EMERGENCY MEDICINE
Payer: MEDICARE

## 2019-08-02 ENCOUNTER — HOSPITAL ENCOUNTER (INPATIENT)
Age: 79
LOS: 5 days | Discharge: HOME | DRG: 446 | End: 2019-08-07
Attending: EMERGENCY MEDICINE | Admitting: FAMILY MEDICINE
Payer: MEDICARE

## 2019-08-02 ENCOUNTER — APPOINTMENT (OUTPATIENT)
Dept: CT IMAGING | Age: 79
DRG: 446 | End: 2019-08-02
Attending: EMERGENCY MEDICINE
Payer: MEDICARE

## 2019-08-02 DIAGNOSIS — K83.1 COMMON BILE DUCT OBSTRUCTION: Primary | ICD-10-CM

## 2019-08-02 LAB
ALBUMIN SERPL-MCNC: 3.8 G/DL (ref 3.4–5)
ALBUMIN/GLOB SERPL: 1.1 {RATIO} (ref 0.8–1.7)
ALP SERPL-CCNC: 590 U/L (ref 45–117)
ALT SERPL-CCNC: 901 U/L (ref 13–56)
ANION GAP SERPL CALC-SCNC: 6 MMOL/L (ref 3–18)
AST SERPL-CCNC: 802 U/L (ref 10–38)
BASOPHILS # BLD: 0 K/UL (ref 0–0.1)
BASOPHILS NFR BLD: 0 % (ref 0–2)
BILIRUB DIRECT SERPL-MCNC: 2.8 MG/DL (ref 0–0.2)
BILIRUB SERPL-MCNC: 3.4 MG/DL (ref 0.2–1)
BUN SERPL-MCNC: 19 MG/DL (ref 7–18)
BUN/CREAT SERPL: 16 (ref 12–20)
CALCIUM SERPL-MCNC: 8.9 MG/DL (ref 8.5–10.1)
CHLORIDE SERPL-SCNC: 104 MMOL/L (ref 100–111)
CK MB CFR SERPL CALC: NORMAL % (ref 0–4)
CK MB SERPL-MCNC: <1 NG/ML (ref 5–25)
CK SERPL-CCNC: 41 U/L (ref 26–192)
CO2 SERPL-SCNC: 28 MMOL/L (ref 21–32)
CREAT SERPL-MCNC: 1.19 MG/DL (ref 0.6–1.3)
DIFFERENTIAL METHOD BLD: ABNORMAL
EOSINOPHIL # BLD: 0.1 K/UL (ref 0–0.4)
EOSINOPHIL NFR BLD: 1 % (ref 0–5)
ERYTHROCYTE [DISTWIDTH] IN BLOOD BY AUTOMATED COUNT: 12.7 % (ref 11.6–14.5)
GLOBULIN SER CALC-MCNC: 3.4 G/DL (ref 2–4)
GLUCOSE SERPL-MCNC: 115 MG/DL (ref 74–99)
HCT VFR BLD AUTO: 39.8 % (ref 35–45)
HGB BLD-MCNC: 13.6 G/DL (ref 12–16)
LACTATE BLD-SCNC: 0.55 MMOL/L (ref 0.4–2)
LIPASE SERPL-CCNC: 320 U/L (ref 73–393)
LYMPHOCYTES # BLD: 1.2 K/UL (ref 0.9–3.6)
LYMPHOCYTES NFR BLD: 6 % (ref 21–52)
MCH RBC QN AUTO: 33.8 PG (ref 24–34)
MCHC RBC AUTO-ENTMCNC: 34.2 G/DL (ref 31–37)
MCV RBC AUTO: 99 FL (ref 74–97)
MONOCYTES # BLD: 0.7 K/UL (ref 0.05–1.2)
MONOCYTES NFR BLD: 4 % (ref 3–10)
NEUTS SEG # BLD: 17.1 K/UL (ref 1.8–8)
NEUTS SEG NFR BLD: 89 % (ref 40–73)
PLATELET # BLD AUTO: 302 K/UL (ref 135–420)
PMV BLD AUTO: 10 FL (ref 9.2–11.8)
POTASSIUM SERPL-SCNC: 3.6 MMOL/L (ref 3.5–5.5)
PROT SERPL-MCNC: 7.2 G/DL (ref 6.4–8.2)
RBC # BLD AUTO: 4.02 M/UL (ref 4.2–5.3)
SODIUM SERPL-SCNC: 138 MMOL/L (ref 136–145)
TROPONIN I SERPL-MCNC: <0.02 NG/ML (ref 0–0.04)
WBC # BLD AUTO: 19.1 K/UL (ref 4.6–13.2)

## 2019-08-02 PROCEDURE — 99285 EMERGENCY DEPT VISIT HI MDM: CPT

## 2019-08-02 PROCEDURE — 83690 ASSAY OF LIPASE: CPT

## 2019-08-02 PROCEDURE — 96376 TX/PRO/DX INJ SAME DRUG ADON: CPT

## 2019-08-02 PROCEDURE — 96375 TX/PRO/DX INJ NEW DRUG ADDON: CPT

## 2019-08-02 PROCEDURE — 74011250636 HC RX REV CODE- 250/636: Performed by: EMERGENCY MEDICINE

## 2019-08-02 PROCEDURE — 74177 CT ABD & PELVIS W/CONTRAST: CPT

## 2019-08-02 PROCEDURE — 80048 BASIC METABOLIC PNL TOTAL CA: CPT

## 2019-08-02 PROCEDURE — 96361 HYDRATE IV INFUSION ADD-ON: CPT

## 2019-08-02 PROCEDURE — 80076 HEPATIC FUNCTION PANEL: CPT

## 2019-08-02 PROCEDURE — 83605 ASSAY OF LACTIC ACID: CPT

## 2019-08-02 PROCEDURE — 71045 X-RAY EXAM CHEST 1 VIEW: CPT

## 2019-08-02 PROCEDURE — 71275 CT ANGIOGRAPHY CHEST: CPT

## 2019-08-02 PROCEDURE — 85025 COMPLETE CBC W/AUTO DIFF WBC: CPT

## 2019-08-02 PROCEDURE — 74011000258 HC RX REV CODE- 258: Performed by: EMERGENCY MEDICINE

## 2019-08-02 PROCEDURE — 74011250636 HC RX REV CODE- 250/636: Performed by: FAMILY MEDICINE

## 2019-08-02 PROCEDURE — 77030040361 HC SLV COMPR DVT MDII -B

## 2019-08-02 PROCEDURE — 74011250637 HC RX REV CODE- 250/637: Performed by: FAMILY MEDICINE

## 2019-08-02 PROCEDURE — 87040 BLOOD CULTURE FOR BACTERIA: CPT

## 2019-08-02 PROCEDURE — 96374 THER/PROPH/DIAG INJ IV PUSH: CPT

## 2019-08-02 PROCEDURE — 74011000258 HC RX REV CODE- 258: Performed by: FAMILY MEDICINE

## 2019-08-02 PROCEDURE — 93005 ELECTROCARDIOGRAM TRACING: CPT

## 2019-08-02 PROCEDURE — 65270000029 HC RM PRIVATE

## 2019-08-02 PROCEDURE — 74011636320 HC RX REV CODE- 636/320: Performed by: EMERGENCY MEDICINE

## 2019-08-02 PROCEDURE — 82550 ASSAY OF CK (CPK): CPT

## 2019-08-02 RX ORDER — SODIUM CHLORIDE 9 MG/ML
150 INJECTION, SOLUTION INTRAVENOUS ONCE
Status: COMPLETED | OUTPATIENT
Start: 2019-08-02 | End: 2019-08-02

## 2019-08-02 RX ORDER — SODIUM CHLORIDE, SODIUM LACTATE, POTASSIUM CHLORIDE, CALCIUM CHLORIDE 600; 310; 30; 20 MG/100ML; MG/100ML; MG/100ML; MG/100ML
75 INJECTION, SOLUTION INTRAVENOUS CONTINUOUS
Status: DISPENSED | OUTPATIENT
Start: 2019-08-02 | End: 2019-08-04

## 2019-08-02 RX ORDER — SODIUM CHLORIDE 9 MG/ML
100 INJECTION, SOLUTION INTRAVENOUS
Status: COMPLETED | OUTPATIENT
Start: 2019-08-02 | End: 2019-08-02

## 2019-08-02 RX ORDER — SODIUM CHLORIDE 0.9 % (FLUSH) 0.9 %
5-10 SYRINGE (ML) INJECTION AS NEEDED
Status: DISCONTINUED | OUTPATIENT
Start: 2019-08-02 | End: 2019-08-07 | Stop reason: HOSPADM

## 2019-08-02 RX ORDER — MORPHINE SULFATE 2 MG/ML
2 INJECTION, SOLUTION INTRAMUSCULAR; INTRAVENOUS
Status: COMPLETED | OUTPATIENT
Start: 2019-08-02 | End: 2019-08-02

## 2019-08-02 RX ORDER — PANTOPRAZOLE SODIUM 40 MG/1
40 TABLET, DELAYED RELEASE ORAL 2 TIMES DAILY
Status: DISCONTINUED | OUTPATIENT
Start: 2019-08-02 | End: 2019-08-07 | Stop reason: HOSPADM

## 2019-08-02 RX ORDER — ONDANSETRON 2 MG/ML
4 INJECTION INTRAMUSCULAR; INTRAVENOUS
Status: COMPLETED | OUTPATIENT
Start: 2019-08-02 | End: 2019-08-02

## 2019-08-02 RX ORDER — SODIUM CHLORIDE 9 MG/ML
100 INJECTION, SOLUTION INTRAVENOUS
Status: ACTIVE | OUTPATIENT
Start: 2019-08-02 | End: 2019-08-03

## 2019-08-02 RX ADMIN — SODIUM CHLORIDE 1000 ML: 900 INJECTION, SOLUTION INTRAVENOUS at 19:33

## 2019-08-02 RX ADMIN — ONDANSETRON 4 MG: 2 INJECTION INTRAMUSCULAR; INTRAVENOUS at 20:16

## 2019-08-02 RX ADMIN — MORPHINE SULFATE 2 MG: 2 INJECTION, SOLUTION INTRAMUSCULAR; INTRAVENOUS at 16:55

## 2019-08-02 RX ADMIN — PANTOPRAZOLE SODIUM 40 MG: 40 TABLET, DELAYED RELEASE ORAL at 22:44

## 2019-08-02 RX ADMIN — Medication 10 ML: at 22:27

## 2019-08-02 RX ADMIN — ONDANSETRON 4 MG: 2 INJECTION INTRAMUSCULAR; INTRAVENOUS at 16:59

## 2019-08-02 RX ADMIN — PIPERACILLIN SODIUM,TAZOBACTAM SODIUM 3.38 G: 3; .375 INJECTION, POWDER, FOR SOLUTION INTRAVENOUS at 22:45

## 2019-08-02 RX ADMIN — SODIUM CHLORIDE, SODIUM LACTATE, POTASSIUM CHLORIDE, AND CALCIUM CHLORIDE 100 ML/HR: 600; 310; 30; 20 INJECTION, SOLUTION INTRAVENOUS at 22:24

## 2019-08-02 RX ADMIN — SODIUM CHLORIDE 150 ML/HR: 900 INJECTION, SOLUTION INTRAVENOUS at 20:16

## 2019-08-02 RX ADMIN — SODIUM CHLORIDE 99 ML: 900 INJECTION, SOLUTION INTRAVENOUS at 17:17

## 2019-08-02 RX ADMIN — IOPAMIDOL 65 ML: 755 INJECTION, SOLUTION INTRAVENOUS at 17:16

## 2019-08-02 NOTE — ED NOTES
ED Course as of Aug 02 1943   Fri Aug 02, 2019   7438 CBD calculus and dilation of biliary tree. Calling GI.    [CB]   1857 [place] CT findings of obstructing stone with the patient intention to call GI and discuss. [CB]   1909 Discussed with Dr. Francheska Pan, discussed elevated white count, CBD stone causing obstruction, but patient is pain-free not encephalopathic, single soft blood pressure at the beginning of her stay but stable since then. Antibiotics and he will see her in the morning.    [CB]   1942 Discussed with , discussed pain-free now but leukocytosis, Dr. Sri Fallon plans for intervention in the morning. He jared See and admit. [CB]      ED Course User Index  [CB] Kristina Epps MD     I did see and evaluate the patient prior to discussion with GI, pain-free, non-alarming abdominal exam, very talkative. Stable vitals. Does not appear toxic. Encounter Diagnoses     ICD-10-CM ICD-9-CM   1.  Common bile duct obstruction K83.1 576.2

## 2019-08-02 NOTE — ED PROVIDER NOTES
EMERGENCY DEPARTMENT HISTORY AND PHYSICAL EXAM    4:29 PM      Date: 8/2/2019  Patient Name: Ben Gomes    History of Presenting Illness     Chief Complaint   Patient presents with    Chest Pain    Shortness of Breath         HISTORY: Ben Gomes is a 78 y.o. female with medical history as listed below who presents with lower chest pain that radiates to her abdomen which is been present since last night. Patient reports having severe pain last night that made it difficult for her to sleep. Pain continued until today but is starting to improve. She also reported that her granddaughter checked her blood pressure earlier today and it was elevated. She denies any nausea or vomiting. Denies fever, cough, current difficulty breathing, diarrhea or constipation. Nothing makes her symptoms better or worse. PCP: Goyo Mckeon MD    Current Outpatient Medications   Medication Sig Dispense Refill    multivitamin with iron tablet Take 1 Tab by mouth daily.  esomeprazole (NEXIUM) 40 mg capsule Take 40 mg by mouth two (2) times a day.          Past History     Past Medical History:  Past Medical History:   Diagnosis Date    Abnormal LFTs     Acute neck sprain     Bursitis     right shoulder    Cervicalgia     Chest pain     CKD (chronic kidney disease), stage III (HCC)     Dyslipidemia     Dyspnea     PATRICIA (generalized anxiety disorder)     Gallstones     Gastritis     Gastritis with bleeding     GERD (gastroesophageal reflux disease)     Hordeolum     Hyperlipidemia     Hypertension     Incontinence     Insomnia     Inverted nipple always    bilateral    OAB (overactive bladder)     Pruritus        Past Surgical History:  Past Surgical History:   Procedure Laterality Date    APPENDECTOMY      HX APPENDECTOMY      HX BREAST BIOPSY Right 30 + yrs ago    x2 benign    HX CHOLECYSTECTOMY      HX COLONOSCOPY      HX ENDOSCOPY      HX HYSTERECTOMY         Family History:  Family History   Problem Relation Age of Onset    No Known Problems Brother     Breast Cancer Daughter 50       Social History:  Social History     Tobacco Use    Smoking status: Former Smoker     Last attempt to quit: 1980     Years since quittin.6    Smokeless tobacco: Never Used   Substance Use Topics    Alcohol use: Yes     Comment: occassionally    Drug use: No       Allergies: Allergies   Allergen Reactions    Codeine Other (comments)     Chills, Loss of consciousness     Norco [Hydrocodone-Acetaminophen] Nausea Only    Tylox [Oxycodone-Acetaminophen] Nausea Only    Vistaril [Hydroxyzine Pamoate] Vertigo         Review of Systems       Review of Systems   Constitutional: Negative for chills. HENT: Negative for congestion and sore throat. Respiratory: Negative for cough and shortness of breath. Cardiovascular: Positive for chest pain. Gastrointestinal: Positive for abdominal pain. Negative for diarrhea, nausea and vomiting. Genitourinary: Negative for dysuria. Musculoskeletal: Negative for back pain. Skin: Negative for rash. Neurological: Negative for dizziness and headaches. All other systems reviewed and are negative. Physical Exam     Visit Vitals  /51   Pulse 81   Temp 98.6 °F (37 °C)   Resp 22   SpO2 96%       Physical Exam  General Exam: Patient is a well developed and well nourished in no distress. Patient does not appear acutely ill or toxic. Eye Exam: Lids and conjunctiva are normal  ENT Exam: The general head and facial exam is normal.  The neck is supple without meningeal signs. No significant adenopathy. Pulmonary Exam: No respiratory distress. The respiratory rate is normal.  No stridor. The breath sounds are equal bilaterally. There are no wheezes, rales, or rhonchi noted. Cardiac Exam: The cardiac rate and rhythm are normal.  No significant murmurs, rubs, or gallops.   The peripheral pulses of the upper extremities are normal.  Abdominal Exam: Abdomen is soft and non-distended. No pulsatile masses. Epigastric and mid abdominal tenderness. There is no rebound or guarding noted. Skin and Soft Tissue: The skin is warm and dry, without significant abnormality. Good color. Musculoskeletal Exam: No peripheral edema. The musculoskeletal exam of the lower extremities is normal without significant local tenderness. Psychiatric: Normal adult with appropriate demeanor and interpersonal interaction. Is oriented to person, place, and time. Diagnostic Study Results     Labs -  Recent Results (from the past 12 hour(s))   EKG, 12 LEAD, INITIAL    Collection Time: 08/02/19  1:26 PM   Result Value Ref Range    Ventricular Rate 95 BPM    Atrial Rate 95 BPM    P-R Interval 182 ms    QRS Duration 78 ms    Q-T Interval 368 ms    QTC Calculation (Bezet) 462 ms    Calculated P Axis 44 degrees    Calculated R Axis 19 degrees    Calculated T Axis 42 degrees    Diagnosis       Normal sinus rhythm  Possible Inferior infarct , age undetermined  Abnormal ECG  When compared with ECG of 22-JUL-2019 00:06,  Vent. rate has increased BY  32 BPM  Borderline criteria for Inferior infarct are now present     CBC WITH AUTOMATED DIFF    Collection Time: 08/02/19  2:05 PM   Result Value Ref Range    WBC 19.1 (H) 4.6 - 13.2 K/uL    RBC 4.02 (L) 4.20 - 5.30 M/uL    HGB 13.6 12.0 - 16.0 g/dL    HCT 39.8 35.0 - 45.0 %    MCV 99.0 (H) 74.0 - 97.0 FL    MCH 33.8 24.0 - 34.0 PG    MCHC 34.2 31.0 - 37.0 g/dL    RDW 12.7 11.6 - 14.5 %    PLATELET 083 683 - 877 K/uL    MPV 10.0 9.2 - 11.8 FL    NEUTROPHILS 89 (H) 40 - 73 %    LYMPHOCYTES 6 (L) 21 - 52 %    MONOCYTES 4 3 - 10 %    EOSINOPHILS 1 0 - 5 %    BASOPHILS 0 0 - 2 %    ABS. NEUTROPHILS 17.1 (H) 1.8 - 8.0 K/UL    ABS. LYMPHOCYTES 1.2 0.9 - 3.6 K/UL    ABS. MONOCYTES 0.7 0.05 - 1.2 K/UL    ABS. EOSINOPHILS 0.1 0.0 - 0.4 K/UL    ABS.  BASOPHILS 0.0 0.0 - 0.1 K/UL    DF AUTOMATED     METABOLIC PANEL, BASIC    Collection Time: 08/02/19 2:05 PM   Result Value Ref Range    Sodium 138 136 - 145 mmol/L    Potassium 3.6 3.5 - 5.5 mmol/L    Chloride 104 100 - 111 mmol/L    CO2 28 21 - 32 mmol/L    Anion gap 6 3.0 - 18 mmol/L    Glucose 115 (H) 74 - 99 mg/dL    BUN 19 (H) 7.0 - 18 MG/DL    Creatinine 1.19 0.6 - 1.3 MG/DL    BUN/Creatinine ratio 16 12 - 20      GFR est AA 53 (L) >60 ml/min/1.73m2    GFR est non-AA 44 (L) >60 ml/min/1.73m2    Calcium 8.9 8.5 - 10.1 MG/DL   CARDIAC PANEL,(CK, CKMB & TROPONIN)    Collection Time: 08/02/19  2:05 PM   Result Value Ref Range    CK 41 26 - 192 U/L    CK - MB <1.0 <3.6 ng/ml    CK-MB Index  0.0 - 4.0 %     CALCULATION NOT PERFORMED WHEN RESULT IS BELOW LINEAR LIMIT    Troponin-I, QT <0.02 0.0 - 0.045 NG/ML   HEPATIC FUNCTION PANEL    Collection Time: 08/02/19  2:05 PM   Result Value Ref Range    Protein, total 7.2 6.4 - 8.2 g/dL    Albumin 3.8 3.4 - 5.0 g/dL    Globulin 3.4 2.0 - 4.0 g/dL    A-G Ratio 1.1 0.8 - 1.7      Bilirubin, total 3.4 (H) 0.2 - 1.0 MG/DL    Bilirubin, direct 2.8 (H) 0.0 - 0.2 MG/DL    Alk. phosphatase 590 (H) 45 - 117 U/L    AST (SGOT) 802 (H) 10 - 38 U/L    ALT (SGPT) 901 (H) 13 - 56 U/L   LIPASE    Collection Time: 08/02/19  2:05 PM   Result Value Ref Range    Lipase 320 73 - 393 U/L       Radiologic Studies -   XR CHEST PORT   Final Result   IMPRESSION:      No acute radiographic cardiopulmonary abnormality. CTA CHEST W OR W WO CONT    (Results Pending)   CT ABD PELV W CONT    (Results Pending)         Medical Decision Making   I am the first provider for this patient. I reviewed the vital signs, available nursing notes, past medical history, past surgical history, family history and social history. Vital Signs-Reviewed the patient's vital signs. EKG: Interpreted by the EP.  EKG performed at 1326  InterpretationRate 95, NSR, no stemi      ED Course: Progress Notes, Reevaluation, and Consults:      Provider Notes (Medical Decision Making): Pt with lower CP radiating to abdominal pain. Improving at time of ed evaluation. Initial BP low but quickly improved with IV fluids. EKG reassuring. TRop neg. Labs with elevated LFTs and bili, normal lipase, elevated WBC. CT scans of C/A/P pending. Pain improving. Will signs out to Dr. Anny De La Vega. Will need GI consult and admission after CT results. Disposition: ADMIT    Follow-up Information    None          Patient's Medications   Start Taking    No medications on file   Continue Taking    ESOMEPRAZOLE (NEXIUM) 40 MG CAPSULE    Take 40 mg by mouth two (2) times a day. MULTIVITAMIN WITH IRON TABLET    Take 1 Tab by mouth daily. These Medications have changed    No medications on file   Stop Taking    No medications on file     _______________________________    Please note that this dictation was completed with [employer], the computer voice recognition software. Quite often unanticipated grammatical, syntax, homophones, and other interpretive errors are inadvertently transcribed by the computer software. Please disregard these errors. Please excuse any errors that have escaped final proofreading.

## 2019-08-02 NOTE — ED TRIAGE NOTES
Mid sternal chest pain started last night. Radiates down abdomen into back.  +short of breath - able to speak in complete sentences  NAD noted in triage    States she has been shivering and that she is freezing. BP low in triage   Charge nurse made aware by Zoe Jones ER tech.

## 2019-08-02 NOTE — ED NOTES
Pt and family updated on plan of care. Awaits results. Pt resting in position of comfort in no distress.

## 2019-08-03 LAB
ALBUMIN SERPL-MCNC: 2.9 G/DL (ref 3.4–5)
ALBUMIN/GLOB SERPL: 1.1 {RATIO} (ref 0.8–1.7)
ALP SERPL-CCNC: 428 U/L (ref 45–117)
ALT SERPL-CCNC: 553 U/L (ref 13–56)
ANION GAP SERPL CALC-SCNC: 6 MMOL/L (ref 3–18)
AST SERPL-CCNC: 284 U/L (ref 10–38)
ATRIAL RATE: 95 BPM
BILIRUB SERPL-MCNC: 1.8 MG/DL (ref 0.2–1)
BUN SERPL-MCNC: 14 MG/DL (ref 7–18)
BUN/CREAT SERPL: 15 (ref 12–20)
CALCIUM SERPL-MCNC: 8.6 MG/DL (ref 8.5–10.1)
CALCULATED P AXIS, ECG09: 44 DEGREES
CALCULATED R AXIS, ECG10: 19 DEGREES
CALCULATED T AXIS, ECG11: 42 DEGREES
CHLORIDE SERPL-SCNC: 109 MMOL/L (ref 100–111)
CO2 SERPL-SCNC: 28 MMOL/L (ref 21–32)
CREAT SERPL-MCNC: 0.96 MG/DL (ref 0.6–1.3)
DIAGNOSIS, 93000: NORMAL
ERYTHROCYTE [DISTWIDTH] IN BLOOD BY AUTOMATED COUNT: 13.3 % (ref 11.6–14.5)
GLOBULIN SER CALC-MCNC: 2.7 G/DL (ref 2–4)
GLUCOSE SERPL-MCNC: 85 MG/DL (ref 74–99)
HCT VFR BLD AUTO: 36.1 % (ref 35–45)
HGB BLD-MCNC: 11.5 G/DL (ref 12–16)
INR PPP: 1.1 (ref 0.8–1.2)
MCH RBC QN AUTO: 32.4 PG (ref 24–34)
MCHC RBC AUTO-ENTMCNC: 31.9 G/DL (ref 31–37)
MCV RBC AUTO: 101.7 FL (ref 74–97)
P-R INTERVAL, ECG05: 182 MS
PLATELET # BLD AUTO: 240 K/UL (ref 135–420)
PMV BLD AUTO: 9.6 FL (ref 9.2–11.8)
POTASSIUM SERPL-SCNC: 4.3 MMOL/L (ref 3.5–5.5)
PROT SERPL-MCNC: 5.6 G/DL (ref 6.4–8.2)
PROTHROMBIN TIME: 14.2 SEC (ref 11.5–15.2)
Q-T INTERVAL, ECG07: 368 MS
QRS DURATION, ECG06: 78 MS
QTC CALCULATION (BEZET), ECG08: 462 MS
RBC # BLD AUTO: 3.55 M/UL (ref 4.2–5.3)
SODIUM SERPL-SCNC: 143 MMOL/L (ref 136–145)
VENTRICULAR RATE, ECG03: 95 BPM
WBC # BLD AUTO: 11.6 K/UL (ref 4.6–13.2)

## 2019-08-03 PROCEDURE — 74011000258 HC RX REV CODE- 258: Performed by: FAMILY MEDICINE

## 2019-08-03 PROCEDURE — 85610 PROTHROMBIN TIME: CPT

## 2019-08-03 PROCEDURE — 85027 COMPLETE CBC AUTOMATED: CPT

## 2019-08-03 PROCEDURE — 74011250636 HC RX REV CODE- 250/636: Performed by: INTERNAL MEDICINE

## 2019-08-03 PROCEDURE — 65270000029 HC RM PRIVATE

## 2019-08-03 PROCEDURE — 36415 COLL VENOUS BLD VENIPUNCTURE: CPT

## 2019-08-03 PROCEDURE — 80053 COMPREHEN METABOLIC PANEL: CPT

## 2019-08-03 PROCEDURE — 74011250636 HC RX REV CODE- 250/636: Performed by: FAMILY MEDICINE

## 2019-08-03 PROCEDURE — 74011250637 HC RX REV CODE- 250/637: Performed by: FAMILY MEDICINE

## 2019-08-03 RX ORDER — OXYCODONE AND ACETAMINOPHEN 5; 325 MG/1; MG/1
1 TABLET ORAL
Status: DISCONTINUED | OUTPATIENT
Start: 2019-08-03 | End: 2019-08-07 | Stop reason: HOSPADM

## 2019-08-03 RX ORDER — HEPARIN SODIUM 5000 [USP'U]/ML
5000 INJECTION, SOLUTION INTRAVENOUS; SUBCUTANEOUS EVERY 8 HOURS
Status: DISCONTINUED | OUTPATIENT
Start: 2019-08-03 | End: 2019-08-07 | Stop reason: HOSPADM

## 2019-08-03 RX ORDER — ONDANSETRON 2 MG/ML
4 INJECTION INTRAMUSCULAR; INTRAVENOUS
Status: DISCONTINUED | OUTPATIENT
Start: 2019-08-03 | End: 2019-08-07 | Stop reason: HOSPADM

## 2019-08-03 RX ORDER — MORPHINE SULFATE 2 MG/ML
1 INJECTION, SOLUTION INTRAMUSCULAR; INTRAVENOUS
Status: DISCONTINUED | OUTPATIENT
Start: 2019-08-03 | End: 2019-08-07 | Stop reason: HOSPADM

## 2019-08-03 RX ADMIN — PIPERACILLIN SODIUM,TAZOBACTAM SODIUM 3.38 G: 3; .375 INJECTION, POWDER, FOR SOLUTION INTRAVENOUS at 10:49

## 2019-08-03 RX ADMIN — PIPERACILLIN SODIUM,TAZOBACTAM SODIUM 3.38 G: 3; .375 INJECTION, POWDER, FOR SOLUTION INTRAVENOUS at 03:31

## 2019-08-03 RX ADMIN — PIPERACILLIN SODIUM,TAZOBACTAM SODIUM 3.38 G: 3; .375 INJECTION, POWDER, FOR SOLUTION INTRAVENOUS at 18:35

## 2019-08-03 RX ADMIN — HEPARIN SODIUM 5000 UNITS: 5000 INJECTION INTRAVENOUS; SUBCUTANEOUS at 09:24

## 2019-08-03 RX ADMIN — HEPARIN SODIUM 5000 UNITS: 5000 INJECTION INTRAVENOUS; SUBCUTANEOUS at 18:35

## 2019-08-03 RX ADMIN — MORPHINE SULFATE 1 MG: 2 INJECTION, SOLUTION INTRAMUSCULAR; INTRAVENOUS at 09:17

## 2019-08-03 RX ADMIN — PANTOPRAZOLE SODIUM 40 MG: 40 TABLET, DELAYED RELEASE ORAL at 18:36

## 2019-08-03 RX ADMIN — SODIUM CHLORIDE, SODIUM LACTATE, POTASSIUM CHLORIDE, AND CALCIUM CHLORIDE 100 ML/HR: 600; 310; 30; 20 INJECTION, SOLUTION INTRAVENOUS at 08:44

## 2019-08-03 NOTE — PROGRESS NOTES
Nutrition initial assessment/  Plan of care      RECOMMENDATIONS:     1. NPO; Advance diet when medically indicated  2. Monitor weight, labs and diet advancement  3. RD to follow     GOALS:     1. PO intake meets >75% of protein/calorie needs by 8/8  2. Weight Maintenance (+/- 1-2 lb by 8/8)    ASSESSMENT:     Weight status is classified as overweight per BMI of 25. Currently not meeting nutrition needs due to NPO diet order. Labs noted. Patient with hypoalbuminemia. Elevated bilirubin and LFT's but trending down. Nutrition recommendations listed. RD to follow. Nutrition Diagnoses: Altered GI function related to common bile duct obstruction as evidenced by NPO diet order. Nutrition Risk:  [] High  [x] Moderate []  Low    SUBJECTIVE/OBJECTIVE:     Admitted with common bile duct obstruction. Received [facility] nutrition screen for 14-23 lb weight loss. Patient with nausea and abdominal pain PTA but reports now resolved. Patient reports usually having a good appetite and eating all of meals but hasn't had anything to eat since 8/1. States she has lost a pound or two here and there recently but started losing weight when having gallbladder out in April of 2018. States weight of 150 lb prior to procedure. Weight appears relatively stable over past 2 years per documented weights (136-142 lb). Denies food allergies and problems with chewing/swallowing. Currently with NPO diet order. Noted plan for ERCP procedure on 8/5. Will monitor diet advancement. Information Obtained from:    [x] Chart Review   [x] Patient   [] Family/Caregiver   [] Nurse/Physician   [] Interdisciplinary Meeting/Rounds    Diet: NPO  Medications: [x] Reviewed IVF: lactated ringers infusion at 100 ml/hr)   Allergies: [x] Reviewed   Encounter Diagnoses     ICD-10-CM ICD-9-CM   1.  Common bile duct obstruction K83.1 576.2     Past Medical History:   Diagnosis Date    Abnormal LFTs     Acute neck sprain     Bursitis     right shoulder    Cervicalgia     Chest pain     CKD (chronic kidney disease), stage III (HCC)     Dyslipidemia     Dyspnea     PATRICIA (generalized anxiety disorder)     Gallstones     Gastritis     Gastritis with bleeding     GERD (gastroesophageal reflux disease)     Hordeolum     Hyperlipidemia     Hypertension     Incontinence     Insomnia     Inverted nipple always    bilateral    OAB (overactive bladder)     Pruritus       Labs:    Lab Results   Component Value Date/Time    Sodium 143 08/03/2019 04:35 AM    Potassium 4.3 08/03/2019 04:35 AM    Chloride 109 08/03/2019 04:35 AM    CO2 28 08/03/2019 04:35 AM    Anion gap 6 08/03/2019 04:35 AM    Glucose 85 08/03/2019 04:35 AM    BUN 14 08/03/2019 04:35 AM    Creatinine 0.96 08/03/2019 04:35 AM    Calcium 8.6 08/03/2019 04:35 AM    Albumin 2.9 (L) 08/03/2019 04:35 AM     Anthropometrics: BMI (calculated): 25  Last 3 Recorded Weights in this Encounter    08/02/19 2158   Weight: 62.1 kg (136 lb 12.8 oz)      Ht Readings from Last 1 Encounters:   08/02/19 5' 2\" (1.575 m)     Weight Metrics 8/2/2019 7/21/2019 10/19/2017 9/28/2017 2/10/2017 2/7/2017 8/11/2016   Weight 136 lb 12.8 oz 139 lb 139 lb 142 lb 135 lb 6.4 oz 134 lb 144 lb   BMI 25.02 kg/m2 24.62 kg/m2 24.62 kg/m2 25.15 kg/m2 24.76 kg/m2 24.51 kg/m2 26.34 kg/m2     No data found. IBW: 110 lb %IBW: 124%    Estimated Nutrition Needs: [x] MSJ    Calories: 1365 Kcal Based on:   [x] Actual BW    Protein:   62-74 g Based on:   [x] Actual BW    Fluid:       0937-1777 ml Based on:   [x] Actual BW      [x] No Cultural, Jewish or ethnic dietary need identified.     [] Cultural, Jewish and ethnic food preferences identified and addressed     Wt Status:  [] Normal (18.6 - 24.9) [] Underweight (<18.5)   [x] Overweight (25 - 29.9) [] Mild Obesity (30 - 34.9)  [] Moderate Obesity (35 - 39.9) [] Morbid Obesity (40+)     Nutrition Problems Identified:   [x] Suboptimal PO intake vs NPO  [] Food Allergies  [] Difficulty chewing/swallowing/poor dentition  [] Constipation/Diarrhea   [x] Nausea/Abdominal pain (resolved per pt)  [] None  [] Other:     Plan:   [] Therapeutic Diet  []  Obtained/adjusted food preferences/tolerances and/or snacks options   []  Supplements added   [] Occupational therapy following for feeding techniques  []  HS snack added   []  Modify diet texture   []  Modify diet for food allergies   []  Educate patient   []  Assist with menu selection   []  Monitor PO intake on meal rounds   [x]  Continue inpatient monitoring and intervention   []  Participated in discharge planning/Interdisciplinary rounds/Team meetings   []  Other:     Education Needs:   [x] Not appropriate for teaching at this time due to: NPO   [] Identified and addressed    Nutrition Monitoring and Evaluation:  [x] Continue ongoing monitoring and intervention  [] Other    Corita Collar

## 2019-08-03 NOTE — PROGRESS NOTES
Problem: Falls - Risk of  Goal: *Absence of Falls  Description  Document Eileen Late Fall Risk and appropriate interventions in the flowsheet.   Outcome: Progressing Towards Goal  Note:   Fall Risk Interventions:            Medication Interventions: Evaluate medications/consider consulting pharmacy                   Problem: Patient Education: Go to Patient Education Activity  Goal: Patient/Family Education  Outcome: Progressing Towards Goal     Problem: Pain  Goal: *Control of Pain  Outcome: Progressing Towards Goal     Problem: Patient Education: Go to Patient Education Activity  Goal: Patient/Family Education  Outcome: Progressing Towards Goal     Problem: Nutrition Deficit  Goal: *Optimize nutritional status  Outcome: Progressing Towards Goal

## 2019-08-03 NOTE — PROGRESS NOTES
Problem: Discharge Planning  Goal: *Discharge to safe environment  Outcome: Progressing Towards Goal  Plan is home with h   Care Management Interventions  PCP Verified by CM: Yes(saw a few weeks ago)  Mode of Transport at Discharge:  Other (see comment)(friend or family)  Transition of Care Consult (CM Consult): Discharge Planning, [address]: Yes  Current Support Network: Lives Alone  Confirm Follow Up Transport: Other (see comment)  Plan discussed with Pt/Family/Caregiver: Yes  Discharge Location  Discharge Placement: Home with home health

## 2019-08-03 NOTE — PROGRESS NOTES
conducted an initial consultation and Spiritual Assessment for Haim Concepcion, who is a 78 y.o.,female. Patients Primary Language is: [place]. According to the patients EMR Church Affiliation is: Jehovah's witness. The reason the Patient came to the hospital is:   Patient Active Problem List    Diagnosis Date Noted    Common bile duct (CBD) obstruction 08/02/2019    Intractable abdominal pain 09/28/2017    Choledocholithiasis 02/08/2017    Persistent vomiting 02/08/2017    Elevated LFTs 02/08/2017    Leukocytosis 02/08/2017    Incontinence     Hypertension     Hyperlipidemia     GERD (gastroesophageal reflux disease)     PATRICIA (generalized anxiety disorder)     CKD (chronic kidney disease), stage III (HCC)     History of gastrointestinal hemorrhage 05/12/2010        The  provided the following Interventions:  Initiated a relationship of care and support. Provided information about Spiritual Care Services. Offered prayer and assurance of continued prayers on patient's behalf. The following outcomes were achieved:  Patient expressed gratitude for 's visit. Assessment:  There are no further spiritual or Evangelical issues which require intervention at this time. Plan:  Chaplains will continue to follow and will provide pastoral care on an as needed/requested basis. Zina Solis M.Div.   , [address] [facility]: [phone]/FCN: [phone]

## 2019-08-03 NOTE — H&P
Internal Medicine History and Physical          Subjective     HPI: Yudi Andre is a 78 y.o. female who presented to the ED with epigastric and anterior chest pain that started 1 week ago, evaluated for heart issues at that time with negative cardiac w/up. Since than she continued to have intermittent epigastric pain, associated with nausea and vomiting. Sx got worse for the last 24 hours. ED evaluation revealed CBD obstruction.  GI consult called from ED for ERCP    Will admit for further evaluation and treatment      PMHx:  Past Medical History:   Diagnosis Date    Abnormal LFTs     Acute neck sprain     Bursitis     right shoulder    Cervicalgia     Chest pain     CKD (chronic kidney disease), stage III (HCC)     Dyslipidemia     Dyspnea     PATRICIA (generalized anxiety disorder)     Gallstones     Gastritis     Gastritis with bleeding     GERD (gastroesophageal reflux disease)     Hordeolum     Hyperlipidemia     Hypertension     Incontinence     Insomnia     Inverted nipple always    bilateral    OAB (overactive bladder)     Pruritus        PSurgHx:  Past Surgical History:   Procedure Laterality Date    APPENDECTOMY      HX APPENDECTOMY      HX BREAST BIOPSY Right 30 + yrs ago    x2 benign    HX CHOLECYSTECTOMY      HX COLONOSCOPY      HX ENDOSCOPY      HX HYSTERECTOMY         SocialHx:  Social History     Socioeconomic History    Marital status:      Spouse name: Not on file    Number of children: Not on file    Years of education: Not on file    Highest education level: Not on file   Occupational History    Not on file   Social Needs    Financial resource strain: Not on file    Food insecurity:     Worry: Not on file     Inability: Not on file    Transportation needs:     Medical: Not on file     Non-medical: Not on file   Tobacco Use    Smoking status: Former Smoker     Last attempt to quit: 1980     Years since quittin.6    Smokeless tobacco: Never Used   Substance and Sexual Activity    Alcohol use: Yes     Comment: occassionally    Drug use: No    Sexual activity: Not on file   Lifestyle    Physical activity:     Days per week: Not on file     Minutes per session: Not on file    Stress: Not on file   Relationships    Social connections:     Talks on phone: Not on file     Gets together: Not on file     Attends Taoist service: Not on file     Active member of club or organization: Not on file     Attends meetings of clubs or organizations: Not on file     Relationship status: Not on file    Intimate partner violence:     Fear of current or ex partner: Not on file     Emotionally abused: Not on file     Physically abused: Not on file     Forced sexual activity: Not on file   Other Topics Concern    Not on file   Social History Narrative    Not on file       Allergies: Allergies   Allergen Reactions    Codeine Other (comments)     Chills, Loss of consciousness     Norco [Hydrocodone-Acetaminophen] Nausea Only    Tylox [Oxycodone-Acetaminophen] Nausea Only    Vistaril [Hydroxyzine Pamoate] Vertigo       FamilyHx:  Family History   Problem Relation Age of Onset    No Known Problems Brother     Breast Cancer Daughter 50       Prior to Admission Medications   Prescriptions Last Dose Informant Patient Reported? Taking?   esomeprazole (NEXIUM) 40 mg capsule   Yes No   Sig: Take 40 mg by mouth two (2) times a day. multivitamin with iron tablet   Yes No   Sig: Take 1 Tab by mouth daily.       Facility-Administered Medications: None       Review of Systems:  CONST: fever(-),   chills(-),   fatigue(-),   malaise(-)  SKIN: itching(-),   rash(-)  EYES: vision - no change from baseline  ENT: ear pain(-),   nasal discharge(-),   sore throat(-),   voice change(-)  RESP: SOB(-),   cough(-),   hemoptysis(-)  CV: chest pain(-),   PND(-),   orthopnea(-),   exertional dyspnea(-),   palpitations(-), syncope(-)  GI: abd pain(+),   Nausea(+),   vomiting(-), diarrhea(-),   melena(-),   hematemesis(-), hematochesia(-)  : dysuria(-),   frequency(-),   urgency(-),   hematuria(-)  MS: arthralgias(-),   myalgias(-)  NEURO: speech w/o change,   tremors(-),   seizures(-),   numbness(-),   dizziness(-)    Objective      Visit Vitals  /75   Pulse 73   Temp 97.8 °F (36.6 °C)   Resp 20   SpO2 97%       Physical Exam:  CONST: NAD,   VSS reviewed  SKIN: rashes(-),   ulcers(-)  EYES: PERRL,   EOMI,   sclerae w/o jaundice  HENT: HEENT,   normal appearing nose and ears,   normal nasal mucosa and turbinates  NECK: supple,   no LA,   trachea is midline,   thyroid w/o goiter or palpable nodules  RESP: normal respiratory effort,   wheezes(-),   rales(-),   rhochi(-),   no consolidation on percussion  CHEST: normal axillae,   retractions(-),   use of accessory muscles(-)  CV: JVD(-),   carotid bruits(-),   RRR,   gallops(-),   murmurs(-),   edema LE(-),   abd bruits(-),   peripheral pulses normal  ABD: soft, tender(+),   distended(-),   HSM(-),   BS(+) in all quadrants,   peritoneal signs(-)  MS: NROM in all extremities,  clubbing(-),   peripheral cyanosis(-)  NEURO: speech normal, tremors(-),   CN II-XII(-),   lateralizing signs(-)  PSYCH: AOC x 3,   appropriate affect,   non-suicidal      Laboratory Studies:  CMP:   Lab Results   Component Value Date/Time     08/02/2019 02:05 PM    K 3.6 08/02/2019 02:05 PM     08/02/2019 02:05 PM    CO2 28 08/02/2019 02:05 PM    AGAP 6 08/02/2019 02:05 PM     (H) 08/02/2019 02:05 PM    BUN 19 (H) 08/02/2019 02:05 PM    CREA 1.19 08/02/2019 02:05 PM    GFRAA 53 (L) 08/02/2019 02:05 PM    GFRNA 44 (L) 08/02/2019 02:05 PM    CA 8.9 08/02/2019 02:05 PM    ALB 3.8 08/02/2019 02:05 PM    TP 7.2 08/02/2019 02:05 PM    GLOB 3.4 08/02/2019 02:05 PM    AGRAT 1.1 08/02/2019 02:05 PM    SGOT 802 (H) 08/02/2019 02:05 PM     (H) 08/02/2019 02:05 PM     CBC:   Lab Results   Component Value Date/Time    WBC 19.1 (H) 08/02/2019 02:05 PM HGB 13.6 08/02/2019 02:05 PM    HCT 39.8 08/02/2019 02:05 PM     08/02/2019 02:05 PM     All Cardiac Markers in the last 24 hours:   Lab Results   Component Value Date/Time    CPK 41 08/02/2019 02:05 PM    CKMB <1.0 08/02/2019 02:05 PM    CKND1  08/02/2019 02:05 PM     CALCULATION NOT PERFORMED WHEN RESULT IS BELOW LINEAR LIMIT    [place] <0.02 08/02/2019 02:05 PM     Liver Panel:   Lab Results   Component Value Date/Time    ALB 3.8 08/02/2019 02:05 PM    CBIL 2.8 (H) 08/02/2019 02:05 PM    TP 7.2 08/02/2019 02:05 PM    GLOB 3.4 08/02/2019 02:05 PM    AGRAT 1.1 08/02/2019 02:05 PM    SGOT 802 (H) 08/02/2019 02:05 PM     (H) 08/02/2019 02:05 PM     (H) 08/02/2019 02:05 PM     Pancreatic Markers:   Lab Results   Component Value Date/Time    LPSE 320 08/02/2019 02:05 PM       Imaging Reviewed:  Cta Chest W Or W Wo Cont    Result Date: 8/2/2019  EXAM: CTA THORAX WITH CONTRAST INDICATION: Chest pain. COMPARISON: None TECHNIQUE: Axial CT imaging from the thoracic inlet through the diaphragm with intravenous contrast. Coronal and sagittal MIP reformats were generated. One or more dose reduction techniques were used on this CT: automated exposure control, adjustment of the mAs and/or kVp according to patient size, and iterative reconstruction techniques. The specific techniques used on this CT exam have been documented in the patient's electronic medical record. Digital Imaging and Communications in Medicine (DICOM) format image data are available to nonaffiliated external healthcare facilities or entities on a secure, media free, reciprocally searchable basis with patient authorization for at least a 12-month period after this study. _______________ FINDINGS: EXAM QUALITY: Overall exam quality is excellent/satisfactory/poor. Pulmonary arterial enhancement is optimal with adequate breath hold and no significant artifact. PULMONARY ARTERIES: No evidence of pulmonary embolism.  MEDIASTINUM: Normal cardiac size. Ascending aortic ectasia measuring about 3.6 cm. No pericardial effusion.] LUNGS: No suspicious nodule. Few atelectatic or fibrotic streaks in lower lobes, lingula and right middle lobe. PLEURA: Normal. AIRWAY: Normal. LYMPH NODES: ATS 4R adenopathy 1.3 cm and 1.4 cm each. Borderline ATS 11 and 12 R adenopathy. OTHER: No acute or aggressive osseous abnormalities identified. _______________     IMPRESSION: 1. No evidence of pulmonary embolism. Ascending aortic ectasia. 2.  Few scattered atelectatic or fibrotic streaks. Right paratracheal adenopathy and borderline right infrahilar adenopathy. Ct Abd Pelv W Cont    Result Date: 8/2/2019  EXAM: CT of the abdomen and pelvis with contrast INDICATION: Chest and abdominal pain. COMPARISON: None. TECHNIQUE: Axial CT imaging of the abdomen and pelvis was performed with intravenous contrast. Multiplanar reformats were generated. One or more dose reduction techniques were used on this CT: automated exposure control, adjustment of the mAs and/or kVp according to patient size, and iterative reconstruction techniques. The specific techniques used on this CT exam have been documented in the patient's electronic medical record. Digital Imaging and Communications in Medicine (DICOM) format image data are available to nonaffiliated external healthcare facilities or entities on a secure, media free, reciprocally searchable basis with patient authorization for at least a 12-month period after this study _______________ FINDINGS: LOWER CHEST: Refer to CTA thorax report. LIVER, BILIARY: Simple appearing cyst in dome of right lobe of liver measures 2.2 cm. Mild intrahepatic biliary dilatation. More prominent dilatation of common duct measuring up to 1.4 cm secondary to distal common duct calculus grossly measuring about 10 mm. Cholecystectomy.  PANCREAS: Normal. SPLEEN: Normal. ADRENALS: Normal. KIDNEYS/URETERS: Hypodense very likely solid mass upper pole left kidney measures about 1.8 cm. Simple appearing cyst lower pole left kidney measures about 3.3 cm. LYMPH NODES: No enlarged lymph nodes. GASTROINTESTINAL TRACT: Left colon diverticulosis. No bowel dilatation. PELVIC ORGANS: Hysterectomy. Left ovarian cystic lesion measures 2.2 cm. Prior measurement about 1.9 cm. VASCULATURE: Unremarkable. BONES: No acute or aggressive osseous abnormalities identified. OTHER: Small fat-containing umbilical hernia. _______________     IMPRESSION: 1. Biliary dilatation secondary to distal common duct calculus. 2. Simple appearing hepatic cyst. Simple appearing left renal cyst. 3. Hypodense mass upper pole left kidney likely solid and, therefore, should be considered renal cell carcinoma until proven otherwise. This could be further evaluated on elective basis with ultrasound. 4. Hysterectomy. Left ovarian cystic lesion is indeterminate and has not changed significant in size since prior study. This could be further evaluated with elective ultrasound if desired. 5. Left colon diverticulosis. Xr Chest Port    Result Date: 8/2/2019  EXAM: XR CHEST PORT CLINICAL INDICATION/HISTORY: Chest pain -Additional: None COMPARISON: Several prior exams, most recently July 21, 2019 TECHNIQUE: Frontal view of the chest _______________ FINDINGS: HEART AND MEDIASTINUM: Normal cardiac size and mediastinal contours. LUNGS AND PLEURAL SPACES: No focal pneumonic consolidation, pneumothorax, or pleural effusion. BONY THORAX AND SOFT TISSUES: No acute osseous abnormality _______________     IMPRESSION: No acute radiographic cardiopulmonary abnormality.            Assessment/Plan     Active Hospital Problems    Diagnosis Date Noted    Common bile duct (CBD) obstruction 08/02/2019    Elevated LFTs 02/08/2017     Common bile duct (CBD) obstruction  NPO  IVF  GI consult for ERCP  Pain management  Start empiric Abx    Elevated LFTs  Monitor LFTs  Repeat labs after procedure      - Cont acceptable home medications for chronic conditions   - DVT protocol and GI prophylaxis    I have personally reviewed all pertinent labs, films and EKGs that have officially resulted. I reviewed available electronic documentation outlining the initial presentation as well as the emergency room physician's encounter. Total time spent with patient and chart review, orders and documentation - 45min out of which more than 50% spent face-to-face with patient.     Dora Graham MD  8/2/2019   9:00 PM      [address] [facility]

## 2019-08-03 NOTE — PROGRESS NOTES
Internal Medicine Progress Note        NAME: Anshu Chandra   :    MRM:  [id number]    Date/Time: 8/3/2019        ASSESSMENT/PLAN:     Common bile duct (CBD) obstruction  NPO  IVF  GI consult for ERCP  Pain management  Start empiric Abx     Elevated LFTs  Monitor LFTs  Repeat labs after procedure      Abdominal pains. Control pains.    - Cont acceptable home medications for chronic conditions   - DVT protocol        Lab Review:     Recent Labs     19  0435 19  1405   WBC 11.6 19.1*   HGB 11.5* 13.6   HCT 36.1 39.8    302     Recent Labs     19  0435 19  1405    138   K 4.3 3.6    104   CO2 28 28   GLU 85 115*   BUN 14 19*   CREA 0.96 1.19   CA 8.6 8.9   ALB 2.9* 3.8   TBILI 1.8* 3.4*   SGOT 284* 802*   * 901*     No results found for: GLUCPOC  No results for input(s): PH, PCO2, PO2, HCO3, FIO2 in the last 72 hours. No results for input(s): INR in the last 72 hours. No lab exists for component: INREXT    Lab Results   Component Value Date/Time    Specimen Description: THROAT SWAB 2011 08:30 AM     Lab Results   Component Value Date/Time    Culture result: PENDING 2019 07:15 PM    Culture result: PENDING 2019 07:05 PM    Culture result: NO GROWTH 6 DAYS 2017 02:17 PM       All Cardiac Markers in the last 24 hours:   Lab Results   Component Value Date/Time    CPK 41 2019 02:05 PM    CKMB <1.0 2019 02:05 PM    CKND1  2019 02:05 PM     CALCULATION NOT PERFORMED WHEN RESULT IS BELOW LINEAR LIMIT    Vickki Coffee <0.02 2019 02:05 PM              Subjective:     Chief Complaint:      abd pains     ROS:  (bold if positive,otherwise negative)    Fever/chills ,  Dysuria   Cough , Sputum , SOB/LOZA , Chest Pain     Diarrhea ,Nausea/Vomit , Abd Pain , Constipation           Objective:     Vitals:  Last 24hrs VS reviewed since prior progress note.  Most recent are:    Visit Vitals  /73   Pulse 65   Temp 97.8 °F (36.6 °C)   Resp 16   Ht 5' 2\" (1.575 m)   Wt 62.1 kg (136 lb 12.8 oz)   SpO2 96%   Breastfeeding? No   BMI 25.02 kg/m²     SpO2 Readings from Last 6 Encounters:   08/03/19 96%   07/21/19 100%   10/02/17 98%   02/10/17 92%   02/07/17 100%            Intake/Output Summary (Last 24 hours) at 8/3/2019 0847  Last data filed at 8/3/2019 0659  Gross per 24 hour   Intake 1058.33 ml   Output    Net 1058.33 ml          Physical Exam:   Gen: Well-developed, well-nourished, in no acute distress  HEENT: Head atraumatic, normocephalic , Pink conjunctivae,  hearing intact to voice, moist mucous membranes  Neck: No apparent JVD, Supple, without masses, thyroid non-tender  Resp: No accessory muscle use, Bilateral BS present,clear breath sounds without wheezes rales or rhonchi  Card:  normal S1, S2 without thrills, bruits or Gallop. No significant lower leg peripheral edema.   Abd: epig tenderness otherwise   Soft, non-tender, not distended, normoactive bowel sounds are present   Musc: No cyanosis or clubbing  Skin: No rashes or ulcers, skin turgor is good   Neuro:  Cranial nerves are grossly intact, no clear area of focal motor weakness, follows commands appropriately    alert     Medications Reviewed: (see below)    Lab Data Reviewed: (see below)    ______________________________________________________________________    Medications:     Current Facility-Administered Medications   Medication Dose Route Frequency    sodium chloride (NS) flush 5-10 mL  5-10 mL IntraVENous PRN    pantoprazole (PROTONIX) tablet 40 mg  40 mg Oral BID    lactated Ringers infusion  100 mL/hr IntraVENous CONTINUOUS    piperacillin-tazobactam (ZOSYN) 3.375 g in 0.9% ##EXTENDED 4-HR INFUSION##  3.375 g IntraVENous Q8H          Total time spent with patient: 35 minutes                  Care Plan discussed with: Patient and Nursing Staff    Discussed:  Care Plan    Prophylaxis:  Hep SQ    Disposition:  Home w/Family             Attending Physician: Courtney Kinney Alisa School, MD

## 2019-08-03 NOTE — ROUTINE PROCESS
2115:TRANSFER - IN REPORT: 
 
Verbal report received from [facility], RN(name) on Yudi Andre  being received from ER(unit) for routine progression of care Report consisted of patients Situation, Background, Assessment and  
Recommendations(SBAR). Information from the following report(s) ED Summary and Intake/Output was reviewed with the receiving nurse. Opportunity for questions and clarification was provided. 2158: Received patient via stretcher. A&Ox4. Behavior appropriate to situations. No sob on RA. Denies any pain or discomfort at this time. Oriented patient to room & surroundings. Call light within reach. Assessment completed upon patients arrival to unit and care assumed. V/s taken & tele box # 6 applied on. SR as initial rhythm. 2224: IVF of LR at 100 ml/hr started per order. 2245: Due med given. 0005: No change from previous assessment. 2385: Due med given. 7009: No change from previous assessment. 0630: Slept good thru night. Needs attended. 0495: Bedside and Verbal shift change report given to Rebeca RN (oncoming nurse) by me (offgoing nurse). Report included the following information SBAR, Kardex, Intake/Output, MAR and Recent Results.

## 2019-08-03 NOTE — PROGRESS NOTES
Bedside shift change report given to MAURO Jose (oncoming nurse) by Lo Bernal RN (offgoing nurse). Report included the following information SBAR, Kardex, Intake/Output, MAR and Recent Results. A/O x4, no pain visualized, non-tele, IV flushed and infusing, call bell and personal belongings in reach.      0844 -- AM medications given, well tolerated, will continue to monitor. 8319 -- Pain level 5, 1 mg Morphine given, well tolerated. 0945 -- Pain reassessed, patient is resting quietly, family present.      [address] -- Reassessment completed, no change in patient condition, will continue to monitor.      1558 -- Reassessment completed, no change in patient condition, will continue to monitor.      Bedside shift change report given to Lo Bernal RN (oncoming nurse) by Mylene Rojas RN (offgoing nurse). Report included the following information SBAR, Kardex, Intake/Output, MAR and Recent Results.

## 2019-08-03 NOTE — ED NOTES
TRANSFER - ED to INPATIENT REPORT:    SBAR report made available to receiving floor on this patient being transferred to [address] [place] [address] ([facility])  for routine progression of care       Admitting diagnosis Common bile duct (CBD) obstruction [K83.1]  Elevated LFTs [R94.5]    Information from the following report(s) SBAR was made available to receiving floor. Lines:   Peripheral IV 08/02/19 Left Antecubital (Active)   Site Assessment Clean, dry, & intact 8/2/2019  2:14 PM   Phlebitis Assessment 0 8/2/2019  2:14 PM   Infiltration Assessment 0 8/2/2019  2:14 PM   Dressing Status Clean, dry, & intact 8/2/2019  2:14 PM   Dressing Type Transparent 8/2/2019  2:14 PM   Hub Color/Line Status Patent; Flushed;Green 8/2/2019  2:14 PM        Medication list confirmed with patient    Opportunity for questions and clarification was provided.       Patient is oriented to time, place, person and situation N/A  Patient is  continent and ambulatory with assist     Valuables transported with patient     Patient transported with:   Tech    MAP (Monitor): 93 =Monitored (most recent)  Vitals w/ MEWS Score (last day)     Date/Time MEWS Score Pulse Resp Temp BP Level of Consciousness SpO2    08/02/19 21:08:52  0  69  14  97.6 °F (36.4 °C)  133/72  Alert  100 %    08/02/19 2000    73  20    144/75    97 %    08/02/19 1930    71  16    142/55    97 %    08/02/19 1915    70  17    119/79    96 %    08/02/19 1900    74  19    135/72    96 %    08/02/19 1845    67  19    111/53    96 %    08/02/19 1830    69  18    113/57    97 %    08/02/19 1815    68  20    119/60    96 %    08/02/19 1749        97.8 °F (36.6 °C)          08/02/19 1645    74  23    118/68    96 %    08/02/19 1630    75  24    135/64    95 %    08/02/19 1615    71  20    126/57    96 %    08/02/19 1600    65  21    124/53    97 %    08/02/19 1545    66  22    115/59    97 %    08/02/19 1530    72  22    123/54    97 %    08/02/19 1515    77 27    106/54    96 %    08/02/19 1500    81  22    108/51    96 %    08/02/19 1420    87  18    105/57  Alert  95 %    08/02/19 1415    87  20    112/68  Alert  97 %    08/02/19 1334  3  (!) 103  18  98.6 °F (37 °C)  (!) 89/52  Alert  100 %              Septic Patients:     Lactic Acid  Lab Results   Component Value Date    [facility]    (Most recent on top)  Repeat drawn: YES Time: 1914     ALL LACTIC ACIDS GREATER THAN 2 MUST BE REPEATED POC WITHIN 4 HOURS OR PRIOR TO ADMISSION               Total Fluid Bolus initiated and documented on MAR: NO    All ordered antibiotics initiated within first 3 hours of TIME ZERO?   NO

## 2019-08-03 NOTE — CONSULTS
History and Physical    Referring Physician: Vasquez Reyes    Consult Date: 8/3/2019       Assessment & Recommendations:     Principal Problem:    Common bile duct (CBD) obstruction (8/2/2019)    Active Problems:    Elevated LFTs (2/8/2017)      1. Retained bile duct stone on CT scan. Patient is current hemodynamically stable. Her WBC is normal after IV fluids and hydration. Her bilirubin has dropped to 1.8 from 3.4 on admission. Patient has normal lipase. Due to stable status will plan on ERCP Monday. Ok for clear liquids. 2. CT also showed questionable renal mass - defer to primary team.     Subjective:     Chief Complaint: Abdominal pain/Bile duct stones    History of Present Illness: Edilberto Lazcano is a 78 y.o. female who is seen in consultation for bile duct stones. Patient reports that in 9/2017 she presented with abdominal pain, nausea and vomiting. She was found to have biliary duct stone. Patient underwent ERCP on 9/29/2017 with clearance of the bile duct. Patient had lap cholecystectomy on 10/1/2017. Patient reported epigastric pain with nausea and vomiting on 7/22 and she was evaluated in the ED. She had negative cardiac work up and was sent home. She represented yesterday and labs showed bilirubin of 3.4 with elevated AST/ALT and AP. Patient had CT scan showing distal CBD stone with mild CBD dilation. Lipase is normal. Patient is afebrile. Her WBC of >19 is currently 11.6 after antibiotics and IVF. Patient reports 5/10 epigastric pain.      Past Medical History:   Diagnosis Date    Abnormal LFTs     Acute neck sprain     Bursitis     right shoulder    Cervicalgia     Chest pain     CKD (chronic kidney disease), stage III (HCC)     Dyslipidemia     Dyspnea     PATRICIA (generalized anxiety disorder)     Gallstones     Gastritis     Gastritis with bleeding     GERD (gastroesophageal reflux disease)     Hordeolum     Hyperlipidemia     Hypertension     Incontinence     Insomnia     Inverted nipple always    bilateral    OAB (overactive bladder)     Pruritus       Past Surgical History:   Procedure Laterality Date    APPENDECTOMY      HX APPENDECTOMY      HX BREAST BIOPSY Right 30 + yrs ago    x2 benign    HX CHOLECYSTECTOMY      HX COLONOSCOPY      HX ENDOSCOPY      HX HYSTERECTOMY       Family History   Problem Relation Age of Onset    No Known Problems Brother     Breast Cancer Daughter 50      Social History     Tobacco Use    Smoking status: Former Smoker     Last attempt to quit: 1980     Years since quittin.6    Smokeless tobacco: Never Used   Substance Use Topics    Alcohol use: Yes     Comment: occassionally       Prior to Admission medications    Medication Sig Start Date End Date Taking? Authorizing Provider   multivitamin with iron tablet Take 1 Tab by mouth daily. Provider, Historical   esomeprazole (NEXIUM) 40 mg capsule Take 40 mg by mouth two (2) times a day. Provider, Historical     Allergies   Allergen Reactions    Codeine Other (comments)     Chills, Loss of consciousness     Norco [Hydrocodone-Acetaminophen] Nausea Only    Tylox [Oxycodone-Acetaminophen] Nausea Only    Vistaril [Hydroxyzine Pamoate] Vertigo        Review of Systems:  A detailed 10 organ review of systems is obtained with pertinent positives as listed in the History of Present Illness and Past Medical History. All others are negative. Objective: Intake and Output:    No intake/output data recorded.  1901 -  0700  In: 1058.3 [I.V.:1058.3]  Out: -     Physical Exam:   General: NAD  Skin:  Extremities and face reveal no rashes. No marion erythema. No telangiectasias on the chest wall. HEENT: Sclerae anicteric. No oral ulcers. No abnormal pigmentation of the lips. The neck is supple. Cardiovascular: Regular rate and rhythm. No murmurs, gallops, or rubs. Respiratory:  Comfortable breathing with no accessory muscle use. Clear breath sounds.   GI:  Abdomen nondistended, soft, and nontender. Normal active bowel sounds. No enlargement of the liver or spleen. No masses palpable. Rectal:  Deferred  Musculoskeletal:  No pitting edema of the lower legs. Neurological:  Gross memory appears intact. Patient is alert and oriented. Psychiatric:  Mood appears appropriate with judgement intact. Lymphatic:  No cervical or supraclavicular adenopathy. Data Review:   Recent Results (from the past 24 hour(s))   CBC WITH AUTOMATED DIFF    Collection Time: 08/02/19  2:05 PM   Result Value Ref Range    WBC 19.1 (H) 4.6 - 13.2 K/uL    RBC 4.02 (L) 4.20 - 5.30 M/uL    HGB 13.6 12.0 - 16.0 g/dL    HCT 39.8 35.0 - 45.0 %    MCV 99.0 (H) 74.0 - 97.0 FL    MCH 33.8 24.0 - 34.0 PG    MCHC 34.2 31.0 - 37.0 g/dL    RDW 12.7 11.6 - 14.5 %    PLATELET 222 725 - 329 K/uL    MPV 10.0 9.2 - 11.8 FL    NEUTROPHILS 89 (H) 40 - 73 %    LYMPHOCYTES 6 (L) 21 - 52 %    MONOCYTES 4 3 - 10 %    EOSINOPHILS 1 0 - 5 %    BASOPHILS 0 0 - 2 %    ABS. NEUTROPHILS 17.1 (H) 1.8 - 8.0 K/UL    ABS. LYMPHOCYTES 1.2 0.9 - 3.6 K/UL    ABS. MONOCYTES 0.7 0.05 - 1.2 K/UL    ABS. EOSINOPHILS 0.1 0.0 - 0.4 K/UL    ABS.  BASOPHILS 0.0 0.0 - 0.1 K/UL    DF AUTOMATED     METABOLIC PANEL, BASIC    Collection Time: 08/02/19  2:05 PM   Result Value Ref Range    Sodium 138 136 - 145 mmol/L    Potassium 3.6 3.5 - 5.5 mmol/L    Chloride 104 100 - 111 mmol/L    CO2 28 21 - 32 mmol/L    Anion gap 6 3.0 - 18 mmol/L    Glucose 115 (H) 74 - 99 mg/dL    BUN 19 (H) 7.0 - 18 MG/DL    Creatinine 1.19 0.6 - 1.3 MG/DL    BUN/Creatinine ratio 16 12 - 20      GFR est AA 53 (L) >60 ml/min/1.73m2    GFR est non-AA 44 (L) >60 ml/min/1.73m2    Calcium 8.9 8.5 - 10.1 MG/DL   CARDIAC PANEL,(CK, CKMB & TROPONIN)    Collection Time: 08/02/19  2:05 PM   Result Value Ref Range    CK 41 26 - 192 U/L    CK - MB <1.0 <3.6 ng/ml    CK-MB Index  0.0 - 4.0 %     CALCULATION NOT PERFORMED WHEN RESULT IS BELOW LINEAR LIMIT    Troponin-I, QT <0.02 0.0 - 0.045 NG/ML HEPATIC FUNCTION PANEL    Collection Time: 08/02/19  2:05 PM   Result Value Ref Range    Protein, total 7.2 6.4 - 8.2 g/dL    Albumin 3.8 3.4 - 5.0 g/dL    Globulin 3.4 2.0 - 4.0 g/dL    A-G Ratio 1.1 0.8 - 1.7      Bilirubin, total 3.4 (H) 0.2 - 1.0 MG/DL    Bilirubin, direct 2.8 (H) 0.0 - 0.2 MG/DL    Alk. phosphatase 590 (H) 45 - 117 U/L    AST (SGOT) 802 (H) 10 - 38 U/L    ALT (SGPT) 901 (H) 13 - 56 U/L   LIPASE    Collection Time: 08/02/19  2:05 PM   Result Value Ref Range    Lipase 320 73 - 393 U/L   CULTURE, BLOOD    Collection Time: 08/02/19  7:05 PM   Result Value Ref Range    Special Requests: PERIPHERAL      Culture result: NO GROWTH AFTER 13 HOURS     POC LACTIC ACID    Collection Time: 08/02/19  7:07 PM   Result Value Ref Range    Lactic Acid (POC) 0.55 0.40 - 2.00 mmol/L   CULTURE, BLOOD    Collection Time: 08/02/19  7:15 PM   Result Value Ref Range    Special Requests: PERIPHERAL      Culture result: NO GROWTH AFTER 13 HOURS     CBC W/O DIFF    Collection Time: 08/03/19  4:35 AM   Result Value Ref Range    WBC 11.6 4.6 - 13.2 K/uL    RBC 3.55 (L) 4.20 - 5.30 M/uL    HGB 11.5 (L) 12.0 - 16.0 g/dL    HCT 36.1 35.0 - 45.0 %    .7 (H) 74.0 - 97.0 FL    MCH 32.4 24.0 - 34.0 PG    MCHC 31.9 31.0 - 37.0 g/dL    RDW 13.3 11.6 - 14.5 %    PLATELET 490 926 - 313 K/uL    MPV 9.6 9.2 - 90.3 FL   METABOLIC PANEL, COMPREHENSIVE    Collection Time: 08/03/19  4:35 AM   Result Value Ref Range    Sodium 143 136 - 145 mmol/L    Potassium 4.3 3.5 - 5.5 mmol/L    Chloride 109 100 - 111 mmol/L    CO2 28 21 - 32 mmol/L    Anion gap 6 3.0 - 18 mmol/L    Glucose 85 74 - 99 mg/dL    BUN 14 7.0 - 18 MG/DL    Creatinine 0.96 0.6 - 1.3 MG/DL    BUN/Creatinine ratio 15 12 - 20      GFR est AA >60 >60 ml/min/1.73m2    GFR est non-AA 56 (L) >60 ml/min/1.73m2    Calcium 8.6 8.5 - 10.1 MG/DL    Bilirubin, total 1.8 (H) 0.2 - 1.0 MG/DL    ALT (SGPT) 553 (H) 13 - 56 U/L    AST (SGOT) 284 (H) 10 - 38 U/L    Alk.  phosphatase 428 (H) 45 - 117 U/L    Protein, total 5.6 (L) 6.4 - 8.2 g/dL    Albumin 2.9 (L) 3.4 - 5.0 g/dL    Globulin 2.7 2.0 - 4.0 g/dL    A-G Ratio 1.1 0.8 - 1.7     PROTHROMBIN TIME + INR    Collection Time: 08/03/19  9:40 AM   Result Value Ref Range    Prothrombin time 14.2 11.5 - 15.2 sec    INR 1.1 0.8 - 1.2           Signed By: Michael Madden MD     August 3, 2019

## 2019-08-03 NOTE — PROGRESS NOTES
Problem: Discharge Planning  Goal: *Discharge to safe environment  Outcome: Progressing Towards Goal  Plan is home with [facility]

## 2019-08-04 LAB
ALBUMIN SERPL-MCNC: 3.1 G/DL (ref 3.4–5)
ALBUMIN/GLOB SERPL: 1 {RATIO} (ref 0.8–1.7)
ALP SERPL-CCNC: 417 U/L (ref 45–117)
ALT SERPL-CCNC: 382 U/L (ref 13–56)
ANION GAP SERPL CALC-SCNC: 7 MMOL/L (ref 3–18)
AST SERPL-CCNC: 103 U/L (ref 10–38)
BASOPHILS # BLD: 0 K/UL (ref 0–0.1)
BASOPHILS NFR BLD: 0 % (ref 0–2)
BILIRUB DIRECT SERPL-MCNC: 0.7 MG/DL (ref 0–0.2)
BILIRUB SERPL-MCNC: 1.2 MG/DL (ref 0.2–1)
BUN SERPL-MCNC: 13 MG/DL (ref 7–18)
BUN/CREAT SERPL: 12 (ref 12–20)
CALCIUM SERPL-MCNC: 8.7 MG/DL (ref 8.5–10.1)
CHLORIDE SERPL-SCNC: 103 MMOL/L (ref 100–111)
CO2 SERPL-SCNC: 28 MMOL/L (ref 21–32)
CREAT SERPL-MCNC: 1.13 MG/DL (ref 0.6–1.3)
DIFFERENTIAL METHOD BLD: ABNORMAL
EOSINOPHIL # BLD: 0.3 K/UL (ref 0–0.4)
EOSINOPHIL NFR BLD: 3 % (ref 0–5)
ERYTHROCYTE [DISTWIDTH] IN BLOOD BY AUTOMATED COUNT: 12.5 % (ref 11.6–14.5)
GLOBULIN SER CALC-MCNC: 3.1 G/DL (ref 2–4)
GLUCOSE SERPL-MCNC: 116 MG/DL (ref 74–99)
HCT VFR BLD AUTO: 37.5 % (ref 35–45)
HGB BLD-MCNC: 12.6 G/DL (ref 12–16)
INR PPP: 1.1 (ref 0.8–1.2)
LYMPHOCYTES # BLD: 1.6 K/UL (ref 0.9–3.6)
LYMPHOCYTES NFR BLD: 17 % (ref 21–52)
MAGNESIUM SERPL-MCNC: 1.9 MG/DL (ref 1.6–2.6)
MCH RBC QN AUTO: 33.1 PG (ref 24–34)
MCHC RBC AUTO-ENTMCNC: 33.6 G/DL (ref 31–37)
MCV RBC AUTO: 98.4 FL (ref 74–97)
MONOCYTES # BLD: 0.8 K/UL (ref 0.05–1.2)
MONOCYTES NFR BLD: 8 % (ref 3–10)
NEUTS SEG # BLD: 6.8 K/UL (ref 1.8–8)
NEUTS SEG NFR BLD: 72 % (ref 40–73)
PHOSPHATE SERPL-MCNC: 2.7 MG/DL (ref 2.5–4.9)
PLATELET # BLD AUTO: 291 K/UL (ref 135–420)
PMV BLD AUTO: 9.8 FL (ref 9.2–11.8)
POTASSIUM SERPL-SCNC: 3.2 MMOL/L (ref 3.5–5.5)
PROT SERPL-MCNC: 6.2 G/DL (ref 6.4–8.2)
PROTHROMBIN TIME: 13.7 SEC (ref 11.5–15.2)
RBC # BLD AUTO: 3.81 M/UL (ref 4.2–5.3)
SODIUM SERPL-SCNC: 138 MMOL/L (ref 136–145)
WBC # BLD AUTO: 9.5 K/UL (ref 4.6–13.2)

## 2019-08-04 PROCEDURE — 74011250637 HC RX REV CODE- 250/637: Performed by: INTERNAL MEDICINE

## 2019-08-04 PROCEDURE — 83735 ASSAY OF MAGNESIUM: CPT

## 2019-08-04 PROCEDURE — 74011250636 HC RX REV CODE- 250/636: Performed by: FAMILY MEDICINE

## 2019-08-04 PROCEDURE — 84100 ASSAY OF PHOSPHORUS: CPT

## 2019-08-04 PROCEDURE — 74011250637 HC RX REV CODE- 250/637: Performed by: FAMILY MEDICINE

## 2019-08-04 PROCEDURE — 36415 COLL VENOUS BLD VENIPUNCTURE: CPT

## 2019-08-04 PROCEDURE — 80048 BASIC METABOLIC PNL TOTAL CA: CPT

## 2019-08-04 PROCEDURE — 85610 PROTHROMBIN TIME: CPT

## 2019-08-04 PROCEDURE — 74011250636 HC RX REV CODE- 250/636: Performed by: INTERNAL MEDICINE

## 2019-08-04 PROCEDURE — 85025 COMPLETE CBC W/AUTO DIFF WBC: CPT

## 2019-08-04 PROCEDURE — 74011000258 HC RX REV CODE- 258: Performed by: FAMILY MEDICINE

## 2019-08-04 PROCEDURE — 80076 HEPATIC FUNCTION PANEL: CPT

## 2019-08-04 PROCEDURE — 65270000029 HC RM PRIVATE

## 2019-08-04 RX ORDER — POTASSIUM CHLORIDE 20 MEQ/1
40 TABLET, EXTENDED RELEASE ORAL
Status: COMPLETED | OUTPATIENT
Start: 2019-08-04 | End: 2019-08-04

## 2019-08-04 RX ADMIN — HEPARIN SODIUM 5000 UNITS: 5000 INJECTION INTRAVENOUS; SUBCUTANEOUS at 00:50

## 2019-08-04 RX ADMIN — PANTOPRAZOLE SODIUM 40 MG: 40 TABLET, DELAYED RELEASE ORAL at 18:37

## 2019-08-04 RX ADMIN — HEPARIN SODIUM 5000 UNITS: 5000 INJECTION INTRAVENOUS; SUBCUTANEOUS at 18:37

## 2019-08-04 RX ADMIN — HEPARIN SODIUM 5000 UNITS: 5000 INJECTION INTRAVENOUS; SUBCUTANEOUS at 08:53

## 2019-08-04 RX ADMIN — MORPHINE SULFATE 1 MG: 2 INJECTION, SOLUTION INTRAMUSCULAR; INTRAVENOUS at 11:55

## 2019-08-04 RX ADMIN — POTASSIUM CHLORIDE 40 MEQ: 20 TABLET, EXTENDED RELEASE ORAL at 09:26

## 2019-08-04 RX ADMIN — PIPERACILLIN SODIUM,TAZOBACTAM SODIUM 3.38 G: 3; .375 INJECTION, POWDER, FOR SOLUTION INTRAVENOUS at 03:02

## 2019-08-04 RX ADMIN — PIPERACILLIN SODIUM,TAZOBACTAM SODIUM 3.38 G: 3; .375 INJECTION, POWDER, FOR SOLUTION INTRAVENOUS at 18:38

## 2019-08-04 RX ADMIN — PIPERACILLIN SODIUM,TAZOBACTAM SODIUM 3.38 G: 3; .375 INJECTION, POWDER, FOR SOLUTION INTRAVENOUS at 10:54

## 2019-08-04 RX ADMIN — PANTOPRAZOLE SODIUM 40 MG: 40 TABLET, DELAYED RELEASE ORAL at 08:53

## 2019-08-04 NOTE — ROUTINE PROCESS
2000: Assumed care. Awake. Quietly resting in bed. Denies any pain or discomfort at this time. Call light within reach. 2230: Sleeping. 0006: No change from previous assessment. 0050: Due med given. 
 
0302: Due med given. 0400: No change from previous assessment. 0600: Slept good thru night. Needs attended. OOB in the recliner. 9210: Bedside and Verbal shift change report given to Rebeca RN (oncoming nurse) by me (offgoing nurse). Report included the following information SBAR, Kardex, Intake/Output, MAR and Recent Results.

## 2019-08-04 NOTE — ROUTINE PROCESS
1905: Assumed care. Awake. Quietly resting. Denies any pain or discomfort at this time. Call light within reach. Reminded patient nothing to eat or drink after MN. Verbalized understanding. 2100: Sleeping. IVF stopped per order. 0006: No change previous assessment. Due Heparin held, for ERCP in AM. 
 
0200: Sleeping. 
 
0325: No change from previous assessment. Due med given. 0630: Slept good thru night. Needs attended. 4484; Bedside and Verbal shift change report given to Oumou Cash RN (oncoming nurse) by me (offgoing nurse). Report included the following information SBAR, Kardex, Intake/Output, MAR and Recent Results.

## 2019-08-04 NOTE — PROGRESS NOTES
Problem: Discharge Planning  Goal: *Discharge to safe environment  Outcome: Progressing Towards Goal  Plan is home with h2h    Reason for Admission:   Common Bile Duct Obstruction                  RRAT Score:     17             Do you (patient/family) have any concerns for transition/discharge? Plan for utilizing home health:   [facility] for [facility]    Current Advanced Directive/Advance Care Plan:  no            Transition of Care Plan:   Home with home health- FYI placed for MD to order. Patient has designated _____Daughter___________________ to participate in his/her discharge plan and to receive any needed information. Name: Hollie Lou  Address:  Phone number: [phone]    Care Management Interventions  PCP Verified by CM: Yes(saw a few weeks ago)  Mode of Transport at Discharge: Other (see comment)(friend or family)  Transition of Care Consult (CM Consult): Discharge Planning, [address]: Yes  Current Support Network: Lives Alone  Confirm Follow Up Transport: Other (see comment)  Plan discussed with Pt/Family/Caregiver: Yes  Discharge Location  Discharge Placement: Home with home health     Late entry from 08/03/19 Chart reviewed and patient verified demographics. She [place] a and b, and  for life. She lives alone in a 3 story home, her bedroom is on ground level, middle floor. She is very independent and very active with groups, she bowls ( uses a knee brace for bowling) and is not homebound. Her friend is to sta with her for a bit after she gets dc'd from here. Patient is agreeable to [facility] home visit. [facility] & [address] Provider list has been given to the patient and/or patient representative. Patient and/or patient representative has signed the [place] of Choice selecting _____[facility]_______________as their preference agency and a copy given. Both Home Health Provider list and Freedom of Choice have been placed on the chart.   She says she is to go to surgery Monday ( ERCP per chart). Her plan is home with home health. Tere Howell.  Merlene Villatoro, GIULIANAN  [facility]  [phone], Pager [phone]  [email or web address] Â®

## 2019-08-04 NOTE — PROGRESS NOTES
Bedside shift change report given to MAURO Jose (oncoming nurse) by Jame Lange RN (offgoing nurse). Report included the following information SBAR, Kardex, Intake/Output, MAR and Recent Results.      0853 -- AM medications given, well tolerated, will continue to monitor. 8559 -- IV fluid rate change.      1120 -- Reassessment completed, no change in patient condition, will continue to monitor.      1155 -- Pain level 7, 1 mg Morphine given, well tolerated. 1225 -- Pain relieved, patient visiting with family up in the chair. 1619 -- Reassessment completed, no change in patient condition, will continue to monitor.      Bedside shift change report given to Jame Lange RN (oncoming nurse) by Ivon Chung RN (offgoing nurse). Report included the following information SBAR, Kardex, Intake/Output, MAR and Recent Results.

## 2019-08-04 NOTE — PROGRESS NOTES
Problem: Falls - Risk of  Goal: *Absence of Falls  Description  Document Ariellayamilka Abreu Fall Risk and appropriate interventions in the flowsheet.   Outcome: Progressing Towards Goal  Note:   Fall Risk Interventions:            Medication Interventions: Patient to call before getting OOB, Teach patient to arise slowly                   Problem: Patient Education: Go to Patient Education Activity  Goal: Patient/Family Education  Outcome: Progressing Towards Goal     Problem: Pain  Goal: *Control of Pain  Outcome: Progressing Towards Goal     Problem: Patient Education: Go to Patient Education Activity  Goal: Patient/Family Education  Outcome: Progressing Towards Goal     Problem: Nutrition Deficit  Goal: *Optimize nutritional status  Outcome: Progressing Towards Goal     Problem: Discharge Planning  Goal: *Discharge to safe environment  Outcome: Progressing Towards Goal

## 2019-08-04 NOTE — PROGRESS NOTES
Internal Medicine Progress Note        NAME: Clovis Lubin   :    MRM:  [id number]    Date/Time: 2019        ASSESSMENT/PLAN:     Common bile duct (CBD) obstruction  NPO  IVF  GI consult for ERCP , plan noted for Monday. LFT improved. Pain management  Start empiric Abx       Elevated LFTs  Monitor LFTs  Repeat labs after procedure      Abdominal pains. Control pains. Replete K , trend     - Cont acceptable home medications for chronic conditions   - DVT protocol        Lab Review:     Recent Labs     19  0550 19  0435 19  1405   WBC 9.5 11.6 19.1*   HGB 12.6 11.5* 13.6   HCT 37.5 36.1 39.8    240 302     Recent Labs     19  0550 19  0940 19  1405     --  143 138   K 3.2*  --  4.3 3.6     --  109 104   CO2 28  --  28 28   *  --  85 115*   BUN 13  --  14 19*   CREA 1.13  --  0.96 1.19   CA 8.7  --  8.6 8.9   MG 1.9  --   --   --    PHOS 2.7  --   --   --    ALB 3.1*  --  2.9* 3.8   TBILI 1.2*  --  1.8* 3.4*   SGOT 103*  --  284* 802*   *  --  553* 901*   INR 1.1 1.1  --   --      No results found for: GLUCPOC  No results for input(s): PH, PCO2, PO2, HCO3, FIO2 in the last 72 hours. Recent Labs     19  0550 19  0940   INR 1.1 1.1       Lab Results   Component Value Date/Time    Specimen Description: THROAT SWAB 2011 08:30 AM     Lab Results   Component Value Date/Time    Culture result: NO GROWTH AFTER 13 HOURS 2019 07:15 PM    Culture result: NO GROWTH AFTER 13 HOURS 2019 07:05 PM    Culture result: NO GROWTH 6 DAYS 2017 02:17 PM       All Cardiac Markers in the last 24 hours:   No results found for: CPK, CK, CKMMB, CKMB, RCK3, CKMBT, CKNDX, CKND1, DEZ, TROPT, TROIQ, REYES, TROPT, TNIPOC, BNP, BNPP           Subjective:     Chief Complaint:      abd pains improved.   Eating sleeping well     ROS:  (bold if positive,otherwise negative)    Fever/chills ,  Dysuria   Cough , Sputum , SOB/LOZA , Chest Pain     Diarrhea ,Nausea/Vomit , Abd Pain , Constipation           Objective:     Vitals:  Last 24hrs VS reviewed since prior progress note. Most recent are:    Visit Vitals  /78   Pulse 63   Temp 97.8 °F (36.6 °C)   Resp 18   Ht 5' 2\" (1.575 m)   Wt 60.9 kg (134 lb 4.8 oz)   SpO2 94%   Breastfeeding? No   BMI 24.56 kg/m²     SpO2 Readings from Last 6 Encounters:   08/04/19 94%   07/21/19 100%   10/02/17 98%   02/10/17 92%   02/07/17 100%            Intake/Output Summary (Last 24 hours) at 8/4/2019 0904  Last data filed at 8/4/2019 0856  Gross per 24 hour   Intake 3120 ml   Output 2650 ml   Net 470 ml          Physical Exam:   Gen: Well-developed, well-nourished, in no acute distress  HEENT: Head atraumatic, normocephalic , Pink conjunctivae,  hearing intact to voice, moist mucous membranes  Neck: No apparent JVD, Supple, without masses, thyroid non-tender  Resp: No accessory muscle use, Bilateral BS present,clear breath sounds without wheezes rales or rhonchi  Card:  normal S1, S2 without thrills, bruits or Gallop. No significant lower leg peripheral edema.   Abd:    Soft, non-tender, not distended, normoactive bowel sounds are present   Musc: No cyanosis or clubbing  Skin: No rashes or ulcers, skin turgor is good   Neuro:  Cranial nerves are grossly intact, no clear area of focal motor weakness, follows commands appropriately    alert     Medications Reviewed: (see below)    Lab Data Reviewed: (see below)    ______________________________________________________________________    Medications:     Current Facility-Administered Medications   Medication Dose Route Frequency    morphine injection 1 mg  1 mg IntraVENous Q4H PRN    oxyCODONE-acetaminophen (PERCOCET) 5-325 mg per tablet 1 Tab  1 Tab Oral Q4H PRN    heparin (porcine) injection 5,000 Units  5,000 Units SubCUTAneous Q8H    ondansetron (ZOFRAN) injection 4 mg  4 mg IntraVENous Q4H PRN    sodium chloride (NS) flush 5-10 mL 5-10 mL IntraVENous PRN    pantoprazole (PROTONIX) tablet 40 mg  40 mg Oral BID    lactated Ringers infusion  75 mL/hr IntraVENous CONTINUOUS    piperacillin-tazobactam (ZOSYN) 3.375 g in 0.9% ##EXTENDED 4-HR INFUSION##  3.375 g IntraVENous Q8H          Total time spent with patient: 25 minutes                  Care Plan discussed with: Patient and Nursing Staff    Discussed:  Care Plan    Prophylaxis:  Hep SQ    Disposition:  Home w/Family             Attending Physician: Ha Sanchez MD

## 2019-08-04 NOTE — PROGRESS NOTES
Problem: Falls - Risk of  Goal: *Absence of Falls  Description  Document Mayra Mora Fall Risk and appropriate interventions in the flowsheet.   Outcome: Progressing Towards Goal  Note:   Fall Risk Interventions:            Medication Interventions: Evaluate medications/consider consulting pharmacy                   Problem: Patient Education: Go to Patient Education Activity  Goal: Patient/Family Education  Outcome: Progressing Towards Goal     Problem: Pain  Goal: *Control of Pain  Outcome: Progressing Towards Goal     Problem: Patient Education: Go to Patient Education Activity  Goal: Patient/Family Education  Outcome: Progressing Towards Goal     Problem: Nutrition Deficit  Goal: *Optimize nutritional status  Outcome: Progressing Towards Goal     Problem: Discharge Planning  Goal: *Discharge to safe environment  Outcome: Progressing Towards Goal

## 2019-08-05 ENCOUNTER — ANESTHESIA (OUTPATIENT)
Dept: ENDOSCOPY | Age: 79
DRG: 446 | End: 2019-08-05
Payer: MEDICARE

## 2019-08-05 ENCOUNTER — ANESTHESIA EVENT (OUTPATIENT)
Dept: ENDOSCOPY | Age: 79
DRG: 446 | End: 2019-08-05
Payer: MEDICARE

## 2019-08-05 ENCOUNTER — APPOINTMENT (OUTPATIENT)
Dept: GENERAL RADIOLOGY | Age: 79
DRG: 446 | End: 2019-08-05
Attending: INTERNAL MEDICINE
Payer: MEDICARE

## 2019-08-05 LAB
ALBUMIN SERPL-MCNC: 3 G/DL (ref 3.4–5)
ALBUMIN/GLOB SERPL: 1.1 {RATIO} (ref 0.8–1.7)
ALP SERPL-CCNC: 454 U/L (ref 45–117)
ALT SERPL-CCNC: 306 U/L (ref 13–56)
ANION GAP SERPL CALC-SCNC: 6 MMOL/L (ref 3–18)
AST SERPL-CCNC: 86 U/L (ref 10–38)
BASOPHILS # BLD: 0 K/UL (ref 0–0.1)
BASOPHILS NFR BLD: 0 % (ref 0–2)
BILIRUB DIRECT SERPL-MCNC: 0.7 MG/DL (ref 0–0.2)
BILIRUB SERPL-MCNC: 1.2 MG/DL (ref 0.2–1)
BUN SERPL-MCNC: 11 MG/DL (ref 7–18)
BUN/CREAT SERPL: 11 (ref 12–20)
CALCIUM SERPL-MCNC: 8.6 MG/DL (ref 8.5–10.1)
CHLORIDE SERPL-SCNC: 106 MMOL/L (ref 100–111)
CO2 SERPL-SCNC: 27 MMOL/L (ref 21–32)
CREAT SERPL-MCNC: 0.98 MG/DL (ref 0.6–1.3)
DIFFERENTIAL METHOD BLD: ABNORMAL
EOSINOPHIL # BLD: 0.4 K/UL (ref 0–0.4)
EOSINOPHIL NFR BLD: 6 % (ref 0–5)
ERYTHROCYTE [DISTWIDTH] IN BLOOD BY AUTOMATED COUNT: 12.8 % (ref 11.6–14.5)
GLOBULIN SER CALC-MCNC: 2.8 G/DL (ref 2–4)
GLUCOSE SERPL-MCNC: 98 MG/DL (ref 74–99)
HCT VFR BLD AUTO: 34.8 % (ref 35–45)
HGB BLD-MCNC: 11.5 G/DL (ref 12–16)
LYMPHOCYTES # BLD: 1.8 K/UL (ref 0.9–3.6)
LYMPHOCYTES NFR BLD: 23 % (ref 21–52)
MCH RBC QN AUTO: 32.7 PG (ref 24–34)
MCHC RBC AUTO-ENTMCNC: 33 G/DL (ref 31–37)
MCV RBC AUTO: 98.9 FL (ref 74–97)
MONOCYTES # BLD: 0.9 K/UL (ref 0.05–1.2)
MONOCYTES NFR BLD: 12 % (ref 3–10)
NEUTS SEG # BLD: 4.4 K/UL (ref 1.8–8)
NEUTS SEG NFR BLD: 59 % (ref 40–73)
PLATELET # BLD AUTO: 260 K/UL (ref 135–420)
PMV BLD AUTO: 9.6 FL (ref 9.2–11.8)
POTASSIUM SERPL-SCNC: 3.5 MMOL/L (ref 3.5–5.5)
PROT SERPL-MCNC: 5.8 G/DL (ref 6.4–8.2)
RBC # BLD AUTO: 3.52 M/UL (ref 4.2–5.3)
SODIUM SERPL-SCNC: 139 MMOL/L (ref 136–145)
WBC # BLD AUTO: 7.6 K/UL (ref 4.6–13.2)

## 2019-08-05 PROCEDURE — 77030006565 HC BG DRN BILE BARD -A: Performed by: INTERNAL MEDICINE

## 2019-08-05 PROCEDURE — 77030009038 HC CATH BILI STN RTVR BSC -C: Performed by: INTERNAL MEDICINE

## 2019-08-05 PROCEDURE — 77030013333: Performed by: INTERNAL MEDICINE

## 2019-08-05 PROCEDURE — 76210000006 HC OR PH I REC 0.5 TO 1 HR: Performed by: INTERNAL MEDICINE

## 2019-08-05 PROCEDURE — 74011250636 HC RX REV CODE- 250/636

## 2019-08-05 PROCEDURE — 74011000250 HC RX REV CODE- 250

## 2019-08-05 PROCEDURE — 74011250637 HC RX REV CODE- 250/637: Performed by: FAMILY MEDICINE

## 2019-08-05 PROCEDURE — C1726 CATH, BAL DIL, NON-VASCULAR: HCPCS | Performed by: INTERNAL MEDICINE

## 2019-08-05 PROCEDURE — 77030007288 HC DEV LOK BILI BSC -A: Performed by: INTERNAL MEDICINE

## 2019-08-05 PROCEDURE — 77030031670 HC DEV INFL ENDOTEK BIG60 MRTM -B: Performed by: INTERNAL MEDICINE

## 2019-08-05 PROCEDURE — 76060000033 HC ANESTHESIA 1 TO 1.5 HR: Performed by: INTERNAL MEDICINE

## 2019-08-05 PROCEDURE — 74011000258 HC RX REV CODE- 258: Performed by: FAMILY MEDICINE

## 2019-08-05 PROCEDURE — 85025 COMPLETE CBC W/AUTO DIFF WBC: CPT

## 2019-08-05 PROCEDURE — 80076 HEPATIC FUNCTION PANEL: CPT

## 2019-08-05 PROCEDURE — 36415 COLL VENOUS BLD VENIPUNCTURE: CPT

## 2019-08-05 PROCEDURE — 74330 X-RAY BILE/PANC ENDOSCOPY: CPT

## 2019-08-05 PROCEDURE — 74011250636 HC RX REV CODE- 250/636: Performed by: FAMILY MEDICINE

## 2019-08-05 PROCEDURE — 80048 BASIC METABOLIC PNL TOTAL CA: CPT

## 2019-08-05 PROCEDURE — 0F798DZ DILATION OF COMMON BILE DUCT WITH INTRALUMINAL DEVICE, VIA NATURAL OR ARTIFICIAL OPENING ENDOSCOPIC: ICD-10-PCS | Performed by: INTERNAL MEDICINE

## 2019-08-05 PROCEDURE — C2625 STENT, NON-COR, TEM W/DEL SY: HCPCS | Performed by: INTERNAL MEDICINE

## 2019-08-05 PROCEDURE — 77030012596 HC SPHNTOM BILI BSC -E: Performed by: INTERNAL MEDICINE

## 2019-08-05 PROCEDURE — 65270000029 HC RM PRIVATE

## 2019-08-05 PROCEDURE — 76040000008: Performed by: INTERNAL MEDICINE

## 2019-08-05 PROCEDURE — 74011250636 HC RX REV CODE- 250/636: Performed by: INTERNAL MEDICINE

## 2019-08-05 DEVICE — BILIARY STENT WITH NAVIFLEXTM RX DELIVERY SYSTEM
Type: IMPLANTABLE DEVICE | Site: BILE DUCT | Status: FUNCTIONAL
Brand: ADVANIX™ BILIARY

## 2019-08-05 RX ORDER — PROPOFOL 10 MG/ML
INJECTION, EMULSION INTRAVENOUS AS NEEDED
Status: DISCONTINUED | OUTPATIENT
Start: 2019-08-05 | End: 2019-08-05 | Stop reason: HOSPADM

## 2019-08-05 RX ORDER — SODIUM CHLORIDE 0.9 % (FLUSH) 0.9 %
5-40 SYRINGE (ML) INJECTION EVERY 8 HOURS
Status: DISCONTINUED | OUTPATIENT
Start: 2019-08-05 | End: 2019-08-07 | Stop reason: HOSPADM

## 2019-08-05 RX ORDER — ONDANSETRON HYDROCHLORIDE 4 MG/2ML
INJECTION, SOLUTION INTRAMUSCULAR; INTRAVENOUS AS NEEDED
Status: DISCONTINUED | OUTPATIENT
Start: 2019-08-05 | End: 2019-08-05 | Stop reason: HOSPADM

## 2019-08-05 RX ORDER — EPHEDRINE SULFATE 50 MG/ML
INJECTION, SOLUTION INTRAVENOUS AS NEEDED
Status: DISCONTINUED | OUTPATIENT
Start: 2019-08-05 | End: 2019-08-05 | Stop reason: HOSPADM

## 2019-08-05 RX ORDER — SODIUM CHLORIDE 0.9 % (FLUSH) 0.9 %
5-40 SYRINGE (ML) INJECTION AS NEEDED
Status: DISCONTINUED | OUTPATIENT
Start: 2019-08-05 | End: 2019-08-07 | Stop reason: HOSPADM

## 2019-08-05 RX ORDER — SODIUM CHLORIDE, SODIUM LACTATE, POTASSIUM CHLORIDE, CALCIUM CHLORIDE 600; 310; 30; 20 MG/100ML; MG/100ML; MG/100ML; MG/100ML
INJECTION, SOLUTION INTRAVENOUS
Status: DISCONTINUED | OUTPATIENT
Start: 2019-08-05 | End: 2019-08-05 | Stop reason: HOSPADM

## 2019-08-05 RX ORDER — SUCCINYLCHOLINE CHLORIDE 100 MG/5ML
SYRINGE (ML) INTRAVENOUS AS NEEDED
Status: DISCONTINUED | OUTPATIENT
Start: 2019-08-05 | End: 2019-08-05 | Stop reason: HOSPADM

## 2019-08-05 RX ORDER — DEXTROMETHORPHAN/PSEUDOEPHED 2.5-7.5/.8
1.2 DROPS ORAL
Status: DISCONTINUED | OUTPATIENT
Start: 2019-08-05 | End: 2019-08-06 | Stop reason: HOSPADM

## 2019-08-05 RX ORDER — LIDOCAINE HYDROCHLORIDE 20 MG/ML
INJECTION, SOLUTION EPIDURAL; INFILTRATION; INTRACAUDAL; PERINEURAL AS NEEDED
Status: DISCONTINUED | OUTPATIENT
Start: 2019-08-05 | End: 2019-08-05 | Stop reason: HOSPADM

## 2019-08-05 RX ADMIN — Medication 10 ML: at 03:25

## 2019-08-05 RX ADMIN — Medication 10 ML: at 19:18

## 2019-08-05 RX ADMIN — PROPOFOL 150 MG: 10 INJECTION, EMULSION INTRAVENOUS at 15:20

## 2019-08-05 RX ADMIN — Medication 100 MG: at 15:21

## 2019-08-05 RX ADMIN — Medication 10 ML: at 20:36

## 2019-08-05 RX ADMIN — PANTOPRAZOLE SODIUM 40 MG: 40 TABLET, DELAYED RELEASE ORAL at 10:46

## 2019-08-05 RX ADMIN — PIPERACILLIN SODIUM,TAZOBACTAM SODIUM 3.38 G: 3; .375 INJECTION, POWDER, FOR SOLUTION INTRAVENOUS at 10:46

## 2019-08-05 RX ADMIN — EPHEDRINE SULFATE 10 MG: 50 INJECTION, SOLUTION INTRAVENOUS at 15:38

## 2019-08-05 RX ADMIN — ONDANSETRON HYDROCHLORIDE 4 MG: 4 INJECTION, SOLUTION INTRAMUSCULAR; INTRAVENOUS at 15:42

## 2019-08-05 RX ADMIN — PIPERACILLIN SODIUM,TAZOBACTAM SODIUM 3.38 G: 3; .375 INJECTION, POWDER, FOR SOLUTION INTRAVENOUS at 19:17

## 2019-08-05 RX ADMIN — HEPARIN SODIUM 5000 UNITS: 5000 INJECTION INTRAVENOUS; SUBCUTANEOUS at 19:17

## 2019-08-05 RX ADMIN — SODIUM CHLORIDE, SODIUM LACTATE, POTASSIUM CHLORIDE, CALCIUM CHLORIDE: 600; 310; 30; 20 INJECTION, SOLUTION INTRAVENOUS at 15:14

## 2019-08-05 RX ADMIN — LIDOCAINE HYDROCHLORIDE 60 MG: 20 INJECTION, SOLUTION EPIDURAL; INFILTRATION; INTRACAUDAL; PERINEURAL at 15:20

## 2019-08-05 RX ADMIN — PANTOPRAZOLE SODIUM 40 MG: 40 TABLET, DELAYED RELEASE ORAL at 19:18

## 2019-08-05 RX ADMIN — PROPOFOL 50 MG: 10 INJECTION, EMULSION INTRAVENOUS at 15:50

## 2019-08-05 RX ADMIN — PIPERACILLIN SODIUM,TAZOBACTAM SODIUM 3.38 G: 3; .375 INJECTION, POWDER, FOR SOLUTION INTRAVENOUS at 03:25

## 2019-08-05 NOTE — PROGRESS NOTES
Problem: Falls - Risk of  Goal: *Absence of Falls  Description  Document Alexis Cárdenas Fall Risk and appropriate interventions in the flowsheet.   Outcome: Progressing Towards Goal  Note:   Fall Risk Interventions:            Medication Interventions: Teach patient to arise slowly                   Problem: Patient Education: Go to Patient Education Activity  Goal: Patient/Family Education  Outcome: Progressing Towards Goal     Problem: Pain  Goal: *Control of Pain  Outcome: Progressing Towards Goal     Problem: Patient Education: Go to Patient Education Activity  Goal: Patient/Family Education  Outcome: Progressing Towards Goal     Problem: Nutrition Deficit  Goal: *Optimize nutritional status  Outcome: Progressing Towards Goal     Problem: Discharge Planning  Goal: *Discharge to safe environment  Outcome: Progressing Towards Goal

## 2019-08-05 NOTE — PROGRESS NOTES
Internal Medicine Progress Note        NAME: Barbie Hdz   :    MRM:  [id number]    Date/Time: 2019        ASSESSMENT/PLAN: going for ERCP     Common bile duct (CBD) obstruction  NPO  IVF  GI consult for ERCP , plan noted for Monday. LFT improved. Pain management  Start empiric Abx       Elevated LFTs  Monitor LFTs. Improving. Repeat labs after procedure      Abdominal pains. Control pains. Replete K , trend     - Cont acceptable home medications for chronic conditions   - DVT protocol        Lab Review:     Recent Labs     1950 19  0435   WBC 7.6 9.5 11.6   HGB 11.5* 12.6 11.5*   HCT 34.8* 37.5 36.1    291 240     Recent Labs     1950 19  0940 19  0435    138  --  143   K 3.5 3.2*  --  4.3    103  --  109   CO2 27 28  --  28   GLU 98 116*  --  85   BUN 11 13  --  14   CREA 0.98 1.13  --  0.96   CA 8.6 8.7  --  8.6   MG  --  1.9  --   --    PHOS  --  2.7  --   --    ALB 3.0* 3.1*  --  2.9*   TBILI 1.2* 1.2*  --  1.8*   SGOT 86* 103*  --  284*   * 382*  --  553*   INR  --  1.1 1.1  --      No results found for: GLUCPOC  No results for input(s): PH, PCO2, PO2, HCO3, FIO2 in the last 72 hours. Recent Labs     1950 19  0940   INR 1.1 1.1       Lab Results   Component Value Date/Time    Specimen Description: THROAT SWAB 2011 08:30 AM     Lab Results   Component Value Date/Time    Culture result: NO GROWTH 3 DAYS 2019 07:15 PM    Culture result: NO GROWTH 3 DAYS 2019 07:05 PM    Culture result: NO GROWTH 6 DAYS 2017 02:17 PM       All Cardiac Markers in the last 24 hours:   No results found for: CPK, CK, CKMMB, CKMB, RCK3, CKMBT, CKNDX, CKND1, DEZ, TROPT, TROIQ, REYES, TROPT, TNIPOC, BNP, BNPP           Subjective:     Chief Complaint:      abd pains improved.   No complain    ROS:  (bold if positive,otherwise negative)    Fever/chills ,  Dysuria   Cough , Sputum , SOB/LOZA , Chest Pain     Diarrhea ,Nausea/Vomit , Abd Pain , Constipation           Objective:     Vitals:  Last 24hrs VS reviewed since prior progress note. Most recent are:    Visit Vitals  /83   Pulse (!) 51   Temp 98.4 °F (36.9 °C)   Resp 14   Ht 5' 2\" (1.575 m)   Wt 60.9 kg (134 lb 4.8 oz)   SpO2 95%   Breastfeeding? No   BMI 24.56 kg/m²     SpO2 Readings from Last 6 Encounters:   08/05/19 95%   07/21/19 100%   10/02/17 98%   02/10/17 92%   02/07/17 100%            Intake/Output Summary (Last 24 hours) at 8/5/2019 0847  Last data filed at 8/5/2019 0336  Gross per 24 hour   Intake 1803.33 ml   Output 1150 ml   Net 653.33 ml          Physical Exam:   Gen: Well-developed, well-nourished, in no acute distress  HEENT: Head atraumatic, normocephalic , Pink conjunctivae,  hearing intact to voice, moist mucous membranes  Neck: No apparent JVD, Supple, without masses, thyroid non-tender  Resp: No accessory muscle use, Bilateral BS present,clear breath sounds without wheezes rales or rhonchi  Card:  normal S1, S2 without thrills, bruits or Gallop. No significant lower leg peripheral edema.   Abd:    Soft, non-tender, not distended, normoactive bowel sounds are present   Musc: No cyanosis or clubbing  Skin: No rashes or ulcers, skin turgor is good   Neuro:  Cranial nerves are grossly intact, no clear area of focal motor weakness, follows commands appropriately    alert     Medications Reviewed: (see below)    Lab Data Reviewed: (see below)    ______________________________________________________________________    Medications:     Current Facility-Administered Medications   Medication Dose Route Frequency    morphine injection 1 mg  1 mg IntraVENous Q4H PRN    oxyCODONE-acetaminophen (PERCOCET) 5-325 mg per tablet 1 Tab  1 Tab Oral Q4H PRN    heparin (porcine) injection 5,000 Units  5,000 Units SubCUTAneous Q8H    ondansetron (ZOFRAN) injection 4 mg  4 mg IntraVENous Q4H PRN    sodium chloride (NS) flush 5-10 mL  5-10 mL IntraVENous PRN    pantoprazole (PROTONIX) tablet 40 mg  40 mg Oral BID    piperacillin-tazobactam (ZOSYN) 3.375 g in 0.9% ##EXTENDED 4-HR INFUSION##  3.375 g IntraVENous Q8H          Total time spent with patient: 25 minutes                  Care Plan discussed with: Patient and Nursing Staff    Discussed:  Care Plan    Prophylaxis:  Hep SQ    Disposition:  Home w/Family             Attending Physician: Caleb Bravo MD

## 2019-08-05 NOTE — PROGRESS NOTES
Problem: Discharge Planning  Goal: *Discharge to safe environment  Outcome: Progressing Towards Goal  Plan is home with [facility]    Chart reviewed. Plan remains home, is agreeable to [facility] visit from home health, will need order. Will cont to follow for further needs. SHILOH Bonner,ext [phone].

## 2019-08-05 NOTE — PROGRESS NOTES
Bedside and Verbal shift change report given to Javier Isabel RN (oncoming nurse) by Mally Valverde RN (offgoing nurse). Report included the following information SBAR, Kardex, [facility] and Recent Results     Yanelis: completed with Steve Lim RN.

## 2019-08-05 NOTE — ANESTHESIA PREPROCEDURE EVALUATION
Relevant Problems   No relevant active problems       Anesthetic History     PONV          Review of Systems / Medical History  Patient summary reviewed, nursing notes reviewed and pertinent labs reviewed    Pulmonary  Within defined limits                 Neuro/Psych   Within defined limits           Cardiovascular    Hypertension: well controlled          Hyperlipidemia    Exercise tolerance: >4 METS     GI/Hepatic/Renal     GERD: well controlled           Endo/Other  Within defined limits           Other Findings              Physical Exam    Airway  Mallampati: III  TM Distance: < 4 cm         Cardiovascular  Regular rate and rhythm,  S1 and S2 normal,  no murmur, click, rub, or gallop  Rhythm: regular  Rate: normal         Dental    Dentition: Poor dentition     Pulmonary  Breath sounds clear to auscultation               Abdominal  Abdominal exam normal       Other Findings            Anesthetic Plan    ASA: 3  Anesthesia type: general          Induction: Intravenous  Anesthetic plan and risks discussed with: Patient

## 2019-08-05 NOTE — PROCEDURES
ERCP Procedure Note    Patient: Eitan Hernandez MRN: [id number]  SSN: [id number]    YOB: 1940  Age: 78 y.o. Sex: female      Date/Time:  8/5/2019 4:23 PM    Procedure: ERCP with Stent Placement a straight plastic  10 cm 7 [place]    Indication: CBD stones  :  Claudean Pearson, MD  Referring Provider:   Denita Hooks MD  History: The history and physical exam were reviewed and updated. Endoscope: [facility] Endo (ERCP) Duodenoscope #[id number]  Extent of Exam: Second portion of the duodenum  ASA: ASA 2 - Patient with mild systemic disease with no functional limitations  Anethesia/Sedation: Monitored Anesthesia Care (MAC)  Assistants: Endoscopy Technician-1: Cosme Duane, CNA  Endoscopy Technician-2: [place], [address] [place] [address]  Endoscopy RN-1: Nasim Mott RN, None      Description of the procedure: The procedure was discussed with the patient including risks, benefits, alternatives including risks of iv sedation, bleeding, perforation, aspiration and pancreatitis. A safety timeout was performed. The patient was placed in the swimmer's position. A bite block was placed. The patient was given sedation via monitored anesthesia care. The patients vital signs were monitored at all times including heart rate/rhythm, blood pressure and oxygen saturation. The endoscope was then passed under direct visualization to the second portion of the duodenum. The ampulla was identified. The common bile duct was cannulated using a 40 Jagtome with a .035 guidewire. A cholangiogram was performed using 50% conray. A sphincterotomy was performed using ERBE at standard settings. No immediate complications were noted. A 12mm extraction balloon was then dragged several times through the common bile duct and passed without difficulty through the sphincterotomy. A completion, balloon cholangiogram was performed. The endoscope was then slowly withdrawn while visualizing the mucosa.   In the stomach a retroflexion was performed and gastric fundus and cardia visualized. The endoscope was then slowly withdrawn. The patient was then transferred to recovery in stable condition. Findings:    Esophagus: The esophageal mucosa was normal with no ulceration, mass or stricture. There was no evidence of Crespo's esophagus or reflux esophagitis. Stomach: The gastric mucosa was normal with no ulceration, mass, stricture. Duodenum: The duodenum mucosa was normal with no ulceration, mass, stricture and no evidence of villous atrophy. Ampulla: wnl   Cholangiogram: Dilated CBD with 2 , 12 mm stones(unable to remove), stent placed   Pancreatogram: not performed    Therapies:  Balloon dilatation of the ampulla, CBD stent placed 10 cm ,7 fr    Specimens:  No specimens in log            Complications:   None; patient tolerated the procedure well. EBL:Minimal  Prosthetic devices, grafts, tissues transplants or devices implanted: None    IMPRESSION:   1. Two , large (12-13 mm ) CBD stones, unable to remove. 2. Stent placed   3. RECOMMENDATIONS:  1. Monitor LFTs  2. Feed pt   3.  Repeat ERCP and remove stones via lithotripsy    Discharge disposition:  Back to room    Kerwin Estrella MD, Bob Lowe, [address] [employer] [facility]  August 5, 2019  4:23 PM

## 2019-08-05 NOTE — ROUTINE PROCESS
TRANSFER - OUT REPORT: 
 
Verbal report given to Donovan Homans, RN (name) on Consueloll Roll  being transferred to [facility](unit) for routine post - op Report consisted of patients Situation, Background, Assessment and  
Recommendations(SBAR). Information from the following report(s) SBAR and Procedure Summary was reviewed with the receiving nurse. Lines:  
Peripheral IV 08/02/19 Left Antecubital (Active) Site Assessment Clean, dry, & intact 8/4/2019  7:05 PM  
Phlebitis Assessment 0 8/4/2019  7:05 PM  
Infiltration Assessment 0 8/4/2019  7:05 PM  
Dressing Status Clean, dry, & intact 8/4/2019  7:05 PM  
Dressing Type Tape;Transparent 8/4/2019  7:05 PM  
Hub Color/Line Status Flushed;Patent; End cap changed 8/5/2019  7:48 AM  
Action Taken Open ports on tubing capped 8/4/2019  3:00 PM  
Alcohol Cap Used Yes 8/5/2019  7:48 AM  
  
 
Opportunity for questions and clarification was provided. Patient transported with: 
 [employer]

## 2019-08-05 NOTE — PROGRESS NOTES
Problem: Falls - Risk of  Goal: *Absence of Falls  Description  Document Eileen Late Fall Risk and appropriate interventions in the flowsheet.   Outcome: Progressing Towards Goal  Note:   Fall Risk Interventions:            Medication Interventions: Teach patient to arise slowly                   Problem: Patient Education: Go to Patient Education Activity  Goal: Patient/Family Education  Outcome: Progressing Towards Goal     Problem: Pain  Goal: *Control of Pain  Outcome: Progressing Towards Goal     Problem: Patient Education: Go to Patient Education Activity  Goal: Patient/Family Education  Outcome: Progressing Towards Goal     Problem: Nutrition Deficit  Goal: *Optimize nutritional status  Outcome: Progressing Towards Goal     Problem: Discharge Planning  Goal: *Discharge to safe environment  Outcome: Progressing Towards Goal

## 2019-08-05 NOTE — ANESTHESIA POSTPROCEDURE EVALUATION
Procedure(s):  ENDOSCOPIC RETROGRADE CHOLANGIOPANCREATOGRAPHY (ERCP). general    Anesthesia Post Evaluation      Multimodal analgesia: multimodal analgesia not used between 6 hours prior to anesthesia start to PACU discharge  Patient location during evaluation: PACU  Patient participation: complete - patient participated  Level of consciousness: awake and alert  Pain score: 0  Airway patency: patent  Anesthetic complications: no  Cardiovascular status: acceptable  Respiratory status: acceptable  Hydration status: acceptable  Post anesthesia nausea and vomiting:  none      Vitals Value Taken Time   /94 8/5/2019  5:08 PM   Temp 36.5 °C (97.7 °F) 8/5/2019  4:33 PM   Pulse 85 8/5/2019  5:11 PM   Resp 28 8/5/2019  5:11 PM   SpO2 94 % 8/5/2019  5:11 PM   Vitals shown include unvalidated device data.

## 2019-08-05 NOTE — PERIOP NOTES
TRANSFER - OUT REPORT:    Verbal report given to toi cook(name) on Laila Aguilar  being transferred to [facility](unit) for routine post - op       Report consisted of patients Situation, Background, Assessment and   Recommendations(SBAR). Information from the following report(s) SBAR, OR Summary, Intake/Output and MAR was reviewed with the receiving nurse. Lines:   Peripheral IV 08/02/19 Left Antecubital (Active)   Site Assessment Clean, dry, & intact 8/4/2019  7:05 PM   Phlebitis Assessment 0 8/4/2019  7:05 PM   Infiltration Assessment 0 8/4/2019  7:05 PM   Dressing Status Clean, dry, & intact 8/4/2019  7:05 PM   Dressing Type Tape;Transparent 8/4/2019  7:05 PM   Hub Color/Line Status Flushed;Patent; End cap changed 8/5/2019  7:48 AM   Action Taken Open ports on tubing capped 8/4/2019  3:00 PM   Alcohol Cap Used Yes 8/5/2019  7:48 AM        Opportunity for questions and clarification was provided.       Patient transported with:   [email or web address]

## 2019-08-06 ENCOUNTER — ANESTHESIA EVENT (OUTPATIENT)
Dept: ENDOSCOPY | Age: 79
DRG: 446 | End: 2019-08-06
Payer: MEDICARE

## 2019-08-06 ENCOUNTER — APPOINTMENT (OUTPATIENT)
Dept: GENERAL RADIOLOGY | Age: 79
DRG: 446 | End: 2019-08-06
Attending: INTERNAL MEDICINE
Payer: MEDICARE

## 2019-08-06 ENCOUNTER — ANESTHESIA (OUTPATIENT)
Dept: ENDOSCOPY | Age: 79
DRG: 446 | End: 2019-08-06
Payer: MEDICARE

## 2019-08-06 LAB
ALBUMIN SERPL-MCNC: 3.1 G/DL (ref 3.4–5)
ALBUMIN/GLOB SERPL: 1 {RATIO} (ref 0.8–1.7)
ALP SERPL-CCNC: 401 U/L (ref 45–117)
ALT SERPL-CCNC: 215 U/L (ref 13–56)
ANION GAP SERPL CALC-SCNC: 5 MMOL/L (ref 3–18)
AST SERPL-CCNC: 42 U/L (ref 10–38)
BASOPHILS # BLD: 0 K/UL (ref 0–0.1)
BASOPHILS NFR BLD: 1 % (ref 0–2)
BILIRUB DIRECT SERPL-MCNC: 0.4 MG/DL (ref 0–0.2)
BILIRUB SERPL-MCNC: 0.7 MG/DL (ref 0.2–1)
BUN SERPL-MCNC: 15 MG/DL (ref 7–18)
BUN/CREAT SERPL: 14 (ref 12–20)
CALCIUM SERPL-MCNC: 8.8 MG/DL (ref 8.5–10.1)
CHLORIDE SERPL-SCNC: 107 MMOL/L (ref 100–111)
CO2 SERPL-SCNC: 28 MMOL/L (ref 21–32)
CREAT SERPL-MCNC: 1.07 MG/DL (ref 0.6–1.3)
DIFFERENTIAL METHOD BLD: ABNORMAL
EOSINOPHIL # BLD: 0.5 K/UL (ref 0–0.4)
EOSINOPHIL NFR BLD: 8 % (ref 0–5)
ERYTHROCYTE [DISTWIDTH] IN BLOOD BY AUTOMATED COUNT: 12.7 % (ref 11.6–14.5)
GLOBULIN SER CALC-MCNC: 3.2 G/DL (ref 2–4)
GLUCOSE SERPL-MCNC: 86 MG/DL (ref 74–99)
HCT VFR BLD AUTO: 36.8 % (ref 35–45)
HGB BLD-MCNC: 12.2 G/DL (ref 12–16)
LIPASE SERPL-CCNC: 165 U/L (ref 73–393)
LYMPHOCYTES # BLD: 1.9 K/UL (ref 0.9–3.6)
LYMPHOCYTES NFR BLD: 28 % (ref 21–52)
MCH RBC QN AUTO: 32.8 PG (ref 24–34)
MCHC RBC AUTO-ENTMCNC: 33.2 G/DL (ref 31–37)
MCV RBC AUTO: 98.9 FL (ref 74–97)
MONOCYTES # BLD: 0.8 K/UL (ref 0.05–1.2)
MONOCYTES NFR BLD: 12 % (ref 3–10)
NEUTS SEG # BLD: 3.5 K/UL (ref 1.8–8)
NEUTS SEG NFR BLD: 51 % (ref 40–73)
PLATELET # BLD AUTO: 307 K/UL (ref 135–420)
PMV BLD AUTO: 9.9 FL (ref 9.2–11.8)
POTASSIUM SERPL-SCNC: 3.8 MMOL/L (ref 3.5–5.5)
PROT SERPL-MCNC: 6.3 G/DL (ref 6.4–8.2)
RBC # BLD AUTO: 3.72 M/UL (ref 4.2–5.3)
SODIUM SERPL-SCNC: 140 MMOL/L (ref 136–145)
WBC # BLD AUTO: 6.8 K/UL (ref 4.6–13.2)

## 2019-08-06 PROCEDURE — 74011250636 HC RX REV CODE- 250/636: Performed by: FAMILY MEDICINE

## 2019-08-06 PROCEDURE — 77030031670 HC DEV INFL ENDOTEK BIG60 MRTM -B: Performed by: INTERNAL MEDICINE

## 2019-08-06 PROCEDURE — 0F7C8ZZ DILATION OF AMPULLA OF VATER, VIA NATURAL OR ARTIFICIAL OPENING ENDOSCOPIC: ICD-10-PCS | Performed by: INTERNAL MEDICINE

## 2019-08-06 PROCEDURE — 74011250637 HC RX REV CODE- 250/637: Performed by: INTERNAL MEDICINE

## 2019-08-06 PROCEDURE — C1726 CATH, BAL DIL, NON-VASCULAR: HCPCS | Performed by: INTERNAL MEDICINE

## 2019-08-06 PROCEDURE — 74011636320 HC RX REV CODE- 636/320: Performed by: INTERNAL MEDICINE

## 2019-08-06 PROCEDURE — 74011000258 HC RX REV CODE- 258: Performed by: INTERNAL MEDICINE

## 2019-08-06 PROCEDURE — 80048 BASIC METABOLIC PNL TOTAL CA: CPT

## 2019-08-06 PROCEDURE — 77030009038 HC CATH BILI STN RTVR BSC -C: Performed by: INTERNAL MEDICINE

## 2019-08-06 PROCEDURE — 74011250636 HC RX REV CODE- 250/636: Performed by: INTERNAL MEDICINE

## 2019-08-06 PROCEDURE — 76060000033 HC ANESTHESIA 1 TO 1.5 HR: Performed by: INTERNAL MEDICINE

## 2019-08-06 PROCEDURE — 65270000029 HC RM PRIVATE

## 2019-08-06 PROCEDURE — 36415 COLL VENOUS BLD VENIPUNCTURE: CPT

## 2019-08-06 PROCEDURE — 0FPB8DZ REMOVAL OF INTRALUMINAL DEVICE FROM HEPATOBILIARY DUCT, VIA NATURAL OR ARTIFICIAL OPENING ENDOSCOPIC: ICD-10-PCS | Performed by: INTERNAL MEDICINE

## 2019-08-06 PROCEDURE — 77030008477 HC STYL SATN SLP COVD -A: Performed by: ANESTHESIOLOGY

## 2019-08-06 PROCEDURE — 77010033678 HC OXYGEN DAILY

## 2019-08-06 PROCEDURE — 80076 HEPATIC FUNCTION PANEL: CPT

## 2019-08-06 PROCEDURE — 74011250637 HC RX REV CODE- 250/637: Performed by: FAMILY MEDICINE

## 2019-08-06 PROCEDURE — 74011250636 HC RX REV CODE- 250/636

## 2019-08-06 PROCEDURE — 83690 ASSAY OF LIPASE: CPT

## 2019-08-06 PROCEDURE — 77030013333: Performed by: INTERNAL MEDICINE

## 2019-08-06 PROCEDURE — 77030008683 HC TU ET CUF COVD -A: Performed by: ANESTHESIOLOGY

## 2019-08-06 PROCEDURE — 0FC98ZZ EXTIRPATION OF MATTER FROM COMMON BILE DUCT, VIA NATURAL OR ARTIFICIAL OPENING ENDOSCOPIC: ICD-10-PCS | Performed by: INTERNAL MEDICINE

## 2019-08-06 PROCEDURE — 76210000006 HC OR PH I REC 0.5 TO 1 HR: Performed by: INTERNAL MEDICINE

## 2019-08-06 PROCEDURE — 74011000250 HC RX REV CODE- 250

## 2019-08-06 PROCEDURE — 74328 X-RAY BILE DUCT ENDOSCOPY: CPT

## 2019-08-06 PROCEDURE — 77030012596 HC SPHNTOM BILI BSC -E: Performed by: INTERNAL MEDICINE

## 2019-08-06 PROCEDURE — 77030007288 HC DEV LOK BILI BSC -A: Performed by: INTERNAL MEDICINE

## 2019-08-06 PROCEDURE — 85025 COMPLETE CBC W/AUTO DIFF WBC: CPT

## 2019-08-06 PROCEDURE — 77030006565 HC BG DRN BILE BARD -A: Performed by: INTERNAL MEDICINE

## 2019-08-06 PROCEDURE — 74011000258 HC RX REV CODE- 258: Performed by: FAMILY MEDICINE

## 2019-08-06 PROCEDURE — 76040000008: Performed by: INTERNAL MEDICINE

## 2019-08-06 RX ORDER — DEXAMETHASONE SODIUM PHOSPHATE 4 MG/ML
INJECTION, SOLUTION INTRA-ARTICULAR; INTRALESIONAL; INTRAMUSCULAR; INTRAVENOUS; SOFT TISSUE AS NEEDED
Status: DISCONTINUED | OUTPATIENT
Start: 2019-08-06 | End: 2019-08-06 | Stop reason: HOSPADM

## 2019-08-06 RX ORDER — FENTANYL CITRATE 50 UG/ML
25 INJECTION, SOLUTION INTRAMUSCULAR; INTRAVENOUS AS NEEDED
Status: DISCONTINUED | OUTPATIENT
Start: 2019-08-06 | End: 2019-08-06 | Stop reason: HOSPADM

## 2019-08-06 RX ORDER — SODIUM CHLORIDE, SODIUM LACTATE, POTASSIUM CHLORIDE, CALCIUM CHLORIDE 600; 310; 30; 20 MG/100ML; MG/100ML; MG/100ML; MG/100ML
50 INJECTION, SOLUTION INTRAVENOUS CONTINUOUS
Status: DISCONTINUED | OUTPATIENT
Start: 2019-08-06 | End: 2019-08-06 | Stop reason: HOSPADM

## 2019-08-06 RX ORDER — LIDOCAINE HYDROCHLORIDE 20 MG/ML
INJECTION, SOLUTION EPIDURAL; INFILTRATION; INTRACAUDAL; PERINEURAL AS NEEDED
Status: DISCONTINUED | OUTPATIENT
Start: 2019-08-06 | End: 2019-08-06 | Stop reason: HOSPADM

## 2019-08-06 RX ORDER — PROPOFOL 10 MG/ML
INJECTION, EMULSION INTRAVENOUS AS NEEDED
Status: DISCONTINUED | OUTPATIENT
Start: 2019-08-06 | End: 2019-08-06 | Stop reason: HOSPADM

## 2019-08-06 RX ORDER — SODIUM CHLORIDE, SODIUM LACTATE, POTASSIUM CHLORIDE, CALCIUM CHLORIDE 600; 310; 30; 20 MG/100ML; MG/100ML; MG/100ML; MG/100ML
25 INJECTION, SOLUTION INTRAVENOUS CONTINUOUS
Status: DISCONTINUED | OUTPATIENT
Start: 2019-08-06 | End: 2019-08-06 | Stop reason: HOSPADM

## 2019-08-06 RX ORDER — LIDOCAINE HYDROCHLORIDE 10 MG/ML
0.1 INJECTION, SOLUTION EPIDURAL; INFILTRATION; INTRACAUDAL; PERINEURAL AS NEEDED
Status: DISCONTINUED | OUTPATIENT
Start: 2019-08-06 | End: 2019-08-06 | Stop reason: HOSPADM

## 2019-08-06 RX ORDER — ONDANSETRON HYDROCHLORIDE 4 MG/2ML
INJECTION, SOLUTION INTRAMUSCULAR; INTRAVENOUS AS NEEDED
Status: DISCONTINUED | OUTPATIENT
Start: 2019-08-06 | End: 2019-08-06 | Stop reason: HOSPADM

## 2019-08-06 RX ORDER — FENTANYL CITRATE 50 UG/ML
INJECTION, SOLUTION INTRAMUSCULAR; INTRAVENOUS AS NEEDED
Status: DISCONTINUED | OUTPATIENT
Start: 2019-08-06 | End: 2019-08-06 | Stop reason: HOSPADM

## 2019-08-06 RX ORDER — SODIUM CHLORIDE, SODIUM LACTATE, POTASSIUM CHLORIDE, CALCIUM CHLORIDE 600; 310; 30; 20 MG/100ML; MG/100ML; MG/100ML; MG/100ML
INJECTION, SOLUTION INTRAVENOUS
Status: DISCONTINUED | OUTPATIENT
Start: 2019-08-06 | End: 2019-08-06 | Stop reason: HOSPADM

## 2019-08-06 RX ORDER — SUCCINYLCHOLINE CHLORIDE 100 MG/5ML
SYRINGE (ML) INTRAVENOUS AS NEEDED
Status: DISCONTINUED | OUTPATIENT
Start: 2019-08-06 | End: 2019-08-06 | Stop reason: HOSPADM

## 2019-08-06 RX ORDER — ONDANSETRON 2 MG/ML
4 INJECTION INTRAMUSCULAR; INTRAVENOUS ONCE
Status: DISCONTINUED | OUTPATIENT
Start: 2019-08-06 | End: 2019-08-06 | Stop reason: HOSPADM

## 2019-08-06 RX ORDER — EPHEDRINE SULFATE 50 MG/ML
INJECTION, SOLUTION INTRAVENOUS AS NEEDED
Status: DISCONTINUED | OUTPATIENT
Start: 2019-08-06 | End: 2019-08-06 | Stop reason: HOSPADM

## 2019-08-06 RX ADMIN — PIPERACILLIN SODIUM,TAZOBACTAM SODIUM 3.38 G: 3; .375 INJECTION, POWDER, FOR SOLUTION INTRAVENOUS at 05:50

## 2019-08-06 RX ADMIN — MORPHINE SULFATE 1 MG: 2 INJECTION, SOLUTION INTRAMUSCULAR; INTRAVENOUS at 09:53

## 2019-08-06 RX ADMIN — Medication 100 MG: at 15:45

## 2019-08-06 RX ADMIN — HEPARIN SODIUM 5000 UNITS: 5000 INJECTION INTRAVENOUS; SUBCUTANEOUS at 00:33

## 2019-08-06 RX ADMIN — PIPERACILLIN SODIUM,TAZOBACTAM SODIUM 3.38 G: 3; .375 INJECTION, POWDER, FOR SOLUTION INTRAVENOUS at 18:02

## 2019-08-06 RX ADMIN — ONDANSETRON HYDROCHLORIDE 4 MG: 4 INJECTION, SOLUTION INTRAMUSCULAR; INTRAVENOUS at 16:18

## 2019-08-06 RX ADMIN — FENTANYL CITRATE 50 MCG: 50 INJECTION, SOLUTION INTRAMUSCULAR; INTRAVENOUS at 15:45

## 2019-08-06 RX ADMIN — MORPHINE SULFATE 1 MG: 2 INJECTION, SOLUTION INTRAMUSCULAR; INTRAVENOUS at 00:33

## 2019-08-06 RX ADMIN — Medication 10 ML: at 05:51

## 2019-08-06 RX ADMIN — SODIUM CHLORIDE, SODIUM LACTATE, POTASSIUM CHLORIDE, CALCIUM CHLORIDE: 600; 310; 30; 20 INJECTION, SOLUTION INTRAVENOUS at 15:42

## 2019-08-06 RX ADMIN — LIDOCAINE HYDROCHLORIDE 60 MG: 20 INJECTION, SOLUTION EPIDURAL; INFILTRATION; INTRACAUDAL; PERINEURAL at 15:45

## 2019-08-06 RX ADMIN — DEXAMETHASONE SODIUM PHOSPHATE 4 MG: 4 INJECTION, SOLUTION INTRA-ARTICULAR; INTRALESIONAL; INTRAMUSCULAR; INTRAVENOUS; SOFT TISSUE at 16:37

## 2019-08-06 RX ADMIN — EPHEDRINE SULFATE 10 MG: 50 INJECTION, SOLUTION INTRAVENOUS at 16:08

## 2019-08-06 RX ADMIN — PANTOPRAZOLE SODIUM 40 MG: 40 TABLET, DELAYED RELEASE ORAL at 09:09

## 2019-08-06 RX ADMIN — Medication 10 ML: at 14:00

## 2019-08-06 RX ADMIN — PANTOPRAZOLE SODIUM 40 MG: 40 TABLET, DELAYED RELEASE ORAL at 18:02

## 2019-08-06 RX ADMIN — PROPOFOL 140 MG: 10 INJECTION, EMULSION INTRAVENOUS at 15:45

## 2019-08-06 RX ADMIN — PIPERACILLIN SODIUM,TAZOBACTAM SODIUM 3.38 G: 3; .375 INJECTION, POWDER, FOR SOLUTION INTRAVENOUS at 11:02

## 2019-08-06 RX ADMIN — PROPOFOL 60 MG: 10 INJECTION, EMULSION INTRAVENOUS at 15:56

## 2019-08-06 NOTE — ROUTINE PROCESS
TRANSFER - OUT REPORT: 
 
Verbal report given to Marycarmen Marrero RN (name) on Barbie Hdz  being transferred to PACU then [facility](unit) for routine post - op Report consisted of patients Situation, Background, Assessment and  
Recommendations(SBAR). Information from the following report(s) SBAR, OR Summary and Recent Results was reviewed with the receiving nurse. Lines:  
Peripheral IV 08/02/19 Left Antecubital (Active) Site Assessment Clean, dry, & intact 8/6/2019  5:03 PM  
Phlebitis Assessment 0 8/6/2019  5:03 PM  
Infiltration Assessment 0 8/6/2019  5:03 PM  
Dressing Status Clean, dry, & intact 8/6/2019  5:03 PM  
Dressing Type Transparent;Tape 8/6/2019  5:03 PM  
Hub Color/Line Status Infusing;Pink 8/6/2019  5:03 PM  
Action Taken Open ports on tubing capped 8/5/2019  8:30 PM  
Alcohol Cap Used Yes 8/5/2019  8:30 PM  
  
 
Opportunity for questions and clarification was provided. Patient transported with: 
 [email or web address]

## 2019-08-06 NOTE — PROGRESS NOTES
Internal Medicine Progress Note        NAME: Markos Penn   :    MRM:  [id number]    Date/Time: 2019        ASSESSMENT/PLAN:      Common bile duct (CBD) obstruction. S/P  ERCP with Stent Placement a straight plastic  10 cm 7 [facility]. Two , large (12-13 mm ) CBD stones, unable to remove, plan for Repeat ERCP and remove stones via lithotripsy noted. Follow LFT . GI following. LFT improved. Pain management  Start empiric Abx Zosyn        Elevated LFTs  Monitor LFTs. Improving. Repeat labs after procedure      Abdominal pains. Control pains. Replete K , trend     - Cont acceptable home medications for chronic conditions   - DVT protocol        Lab Review:     Recent Labs     19  0600 19  0225 19  0550   WBC 6.8 7.6 9.5   HGB 12.2 11.5* 12.6   HCT 36.8 34.8* 37.5    260 291     Recent Labs     19  0619  0225 19  0550 19  0940    139 138  --    K 3.8 3.5 3.2*  --     106 103  --    CO2 28 27 28  --    GLU 86 98 116*  --    BUN 15 11 13  --    CREA 1.07 0.98 1.13  --    CA 8.8 8.6 8.7  --    MG  --   --  1.9  --    PHOS  --   --  2.7  --    ALB 3.1* 3.0* 3.1*  --    TBILI 0.7 1.2* 1.2*  --    SGOT 42* 86* 103*  --    * 306* 382*  --    INR  --   --  1.1 1.1     No results found for: GLUCPOC  No results for input(s): PH, PCO2, PO2, HCO3, FIO2 in the last 72 hours.   Recent Labs     19  0550 19  0940   INR 1.1 1.1       Lab Results   Component Value Date/Time    Specimen Description: THROAT SWAB 2011 08:30 AM     Lab Results   Component Value Date/Time    Culture result: NO GROWTH 4 DAYS 2019 07:15 PM    Culture result: NO GROWTH 4 DAYS 2019 07:05 PM    Culture result: NO GROWTH 6 DAYS 2017 02:17 PM       All Cardiac Markers in the last 24 hours:   No results found for: CPK, CK, CKMMB, CKMB, RCK3, CKMBT, CKNDX, CKND1, DEZ, TROPT, TROIQ, REYES, TROPT, TNIPOC, BNP, BNPP           Subjective: Chief Complaint:      abd pains improved. No other  complain    ROS:  (bold if positive,otherwise negative)    Fever/chills ,  Dysuria   Cough , Sputum , SOB/LOZA , Chest Pain     Diarrhea ,Nausea/Vomit , Abd Pain , Constipation           Objective:     Vitals:  Last 24hrs VS reviewed since prior progress note. Most recent are:    Visit Vitals  BP (!) 161/93   Pulse 65   Temp 98 °F (36.7 °C)   Resp 20   Ht 5' 2\" (1.575 m)   Wt 61.3 kg (135 lb 2.3 oz)   SpO2 97%   Breastfeeding? No   BMI 24.72 kg/m²     SpO2 Readings from Last 6 Encounters:   08/06/19 97%   07/21/19 100%   10/02/17 98%   02/10/17 92%   02/07/17 100%            Intake/Output Summary (Last 24 hours) at 8/6/2019 0939  Last data filed at 8/5/2019 1618  Gross per 24 hour   Intake 900 ml   Output    Net 900 ml          Physical Exam:   Gen: Well-developed, well-nourished, in no acute distress  HEENT: Head atraumatic, normocephalic , Pink conjunctivae,  hearing intact to voice, moist mucous membranes  Neck: No apparent JVD, Supple, without masses, thyroid non-tender  Resp: No accessory muscle use, Bilateral BS present,clear breath sounds without wheezes rales or rhonchi  Card:  normal S1, S2 without thrills, bruits or Gallop. No significant lower leg peripheral edema.   Abd:    Soft, non-tender, not distended, normoactive bowel sounds are present   Musc: No cyanosis or clubbing  Skin: No rashes or ulcers, skin turgor is good   Neuro:  Cranial nerves are grossly intact, no clear area of focal motor weakness, follows commands appropriately    alert     Medications Reviewed: (see below)    Lab Data Reviewed: (see below)    ______________________________________________________________________    Medications:     Current Facility-Administered Medications   Medication Dose Route Frequency    sodium chloride (NS) flush 5-40 mL  5-40 mL IntraVENous Q8H    sodium chloride (NS) flush 5-40 mL  5-40 mL IntraVENous PRN    simethicone (MYLICON) 92SZ/7.4OZ oral drops 80 mg  1.2 mL Oral Multiple    ioversol (OPTIRAY) 320 mg iodine/mL contrast injection 100 mL  100 mL IntraVENous PRN    morphine injection 1 mg  1 mg IntraVENous Q4H PRN    oxyCODONE-acetaminophen (PERCOCET) 5-325 mg per tablet 1 Tab  1 Tab Oral Q4H PRN    heparin (porcine) injection 5,000 Units  5,000 Units SubCUTAneous Q8H    ondansetron (ZOFRAN) injection 4 mg  4 mg IntraVENous Q4H PRN    sodium chloride (NS) flush 5-10 mL  5-10 mL IntraVENous PRN    pantoprazole (PROTONIX) tablet 40 mg  40 mg Oral BID    piperacillin-tazobactam (ZOSYN) 3.375 g in 0.9% ##EXTENDED 4-HR INFUSION##  3.375 g IntraVENous Q8H          Total time spent with patient: 25 minutes                  Care Plan discussed with: Patient and Nursing Staff    Discussed:  Care Plan    Prophylaxis:  Hep SQ    Disposition:  Home w/Family             Attending Physician: Kavita Rowe MD

## 2019-08-06 NOTE — ROUTINE PROCESS
Bedside and Verbal shift change report given to [facility] RN (oncoming nurse) by Miranda Ortiz RN (offgoing nurse). Report included the following information SBAR, Procedure Summary, Intake/Output and MAR. Pt NPO at midnight. Bedside and Verbal shift change report given to Daisy Rodriguez (oncoming nurse) by [facility] RN (offgoing nurse). Report included the following information SBAR, Intake/Output and MAR.

## 2019-08-06 NOTE — PROGRESS NOTES
Problem: Falls - Risk of  Goal: *Absence of Falls  Description  Document Bi Oseiadalberto Fall Risk and appropriate interventions in the flowsheet.   Outcome: Progressing Towards Goal  Note:   Fall Risk Interventions:            Medication Interventions: Bed/chair exit alarm                   Problem: Patient Education: Go to Patient Education Activity  Goal: Patient/Family Education  Outcome: Progressing Towards Goal     Problem: Pain  Goal: *Control of Pain  Outcome: Progressing Towards Goal     Problem: Patient Education: Go to Patient Education Activity  Goal: Patient/Family Education  Outcome: Progressing Towards Goal     Problem: Nutrition Deficit  Goal: *Optimize nutritional status  Outcome: Progressing Towards Goal     Problem: Discharge Planning  Goal: *Discharge to safe environment  Outcome: Progressing Towards Goal

## 2019-08-06 NOTE — ANESTHESIA POSTPROCEDURE EVALUATION
Procedure(s):  ENDOSCOPIC RETROGRADE CHOLANGIOPANCREATOGRAPHY (ERCP). general    Anesthesia Post Evaluation      Multimodal analgesia: multimodal analgesia used between 6 hours prior to anesthesia start to PACU discharge  Patient location during evaluation: bedside  Patient participation: complete - patient participated  Level of consciousness: awake  Pain management: adequate  Airway patency: patent  Anesthetic complications: no  Cardiovascular status: stable  Respiratory status: acceptable  Hydration status: acceptable  Post anesthesia nausea and vomiting:  controlled      Vitals Value Taken Time   /72 8/6/2019  5:08 PM   Temp 37.1 °C (98.8 °F) 8/6/2019  4:53 PM   Pulse 73 8/6/2019  5:22 PM   Resp 17 8/6/2019  5:22 PM   SpO2 92 % 8/6/2019  5:22 PM   Vitals shown include unvalidated device data.

## 2019-08-06 NOTE — ANESTHESIA PREPROCEDURE EVALUATION
Relevant Problems   No relevant active problems       Anesthetic History     PONV          Review of Systems / Medical History  Patient summary reviewed and pertinent labs reviewed    Pulmonary  Within defined limits                 Neuro/Psych   Within defined limits           Cardiovascular    Hypertension              Exercise tolerance: >4 METS     GI/Hepatic/Renal     GERD    Renal disease: CRI      Comments: Bile duct stone Endo/Other  Within defined limits           Other Findings              Physical Exam    Airway  Mallampati: II  TM Distance: 4 - 6 cm  Neck ROM: normal range of motion   Mouth opening: Normal     Cardiovascular  Regular rate and rhythm,  S1 and S2 normal,  no murmur, click, rub, or gallop  Rhythm: regular  Rate: normal         Dental    Dentition: Lower dentition intact and Upper dentition intact     Pulmonary  Breath sounds clear to auscultation               Abdominal  GI exam deferred       Other Findings            Anesthetic Plan    ASA: 2  Anesthesia type: general          Induction: Intravenous  Anesthetic plan and risks discussed with: Patient

## 2019-08-06 NOTE — PROGRESS NOTES
Bedside and Verbal shift change report given to [facility], RN (oncoming nurse) by Brain Neither, RN (offgoing nurse). Report included the following information SBAR, Kardex, OR Summary, MAR and Recent Results.

## 2019-08-06 NOTE — PROGRESS NOTES
0700: Received report from [address] [place] [address] care of patient in bed in low locked position with phone and call bell in reach. 1900: Bedside and Verbal shift change report given to [facility] RN (oncoming nurse) by Suzanne Jones RN  (offgoing nurse).  Report included the following information Procedure Summary, Intake/Output, Alarm Parameters  and Pre Procedure Checklist.

## 2019-08-06 NOTE — PERIOP NOTES
Pt transported to PACU via strecther with Rn and CRNA. SBAR to Kristina Salazar RN. Placed on monitor, VSS.

## 2019-08-06 NOTE — PROCEDURES
ERCP Procedure Note    Patient: Raheem Irby MRN: [id number]  SSN: [id number]    YOB: 1940  Age: 78 y.o. Sex: female      Date/Time:  8/6/2019 5:30 PM    Procedure: ERCP with removal of biliary stent, extention of previous sphincterotomy, balloon-sphincteroplasty, and stone extraction with clearance of the bile duct, under fluoroscopy directed and interpreted by endoscopist    IMPRESSION:   1. Choledocholithiasis with bile duct obstruction. 2. Successful removal of biliary stent. 3. Successful extension of biliary sphincterotomy and balloon sphincteroplasty to a diameter of 13.5 mm.  4. Successful extraction of CBD stones with clearance of the bile duct. RECOMMENDATIONS:  1. Resume oral feeding. 2. CBC and CMP in am.   3. Home in the next day or two if stable. Indication: Choledocholithiasis with ductal obstruction. :  Tereza Krishnan MD  Referring Provider:   Mora Cohen MD  History: The history and physical exam were reviewed and updated. Endoscope: [facility] endo (ERCP) Duodenoscopes [id number]  Extent of Exam: Second portion of the duodenum  ASA: Per Anesthesia  Anethesia/Sedation: General anesthesia. Description of the procedure: The procedure was discussed with the patient including risks, benefits, alternatives including risks of iv sedation, bleeding, perforation, aspiration and pancreatitis. The patient was placed under general anesthesia. A bite block was inserted. The patient was placed in a semi-prone position. The patients vital signs were monitored at all times including heart rate/rhythm, blood pressure and oxygen saturation. The endoscope was then passed under direct visualization to the second portion of the duodenum. The ampulla was identified. After completion of the procedure the endoscope was removed, the patient was extubated and sent to PACU. Findings:      Ampulla: A biliary stent was seen entering the bile duct.  At was snared out. On inspection the sphincter had a deformity consistent with a previous sphincterotomy. There was a residual intraduodenal portion of the sphincter. The sphincterotomy was extended by about 3-4 mm using a pull-type sphincterotome over a guidewire. Cholangiogram: Two large filling defects were seen in a dilated CBD. A balloon sphincteroplasty up to a diameter of 13.5 mm was performed. After that a 12-15 mm stone-extraction balloon was used to removed two large cholesterol stones. At the end of the intervention an occlusion cholangiogram showed the duct to be clear. Pancreatogram: Not attempted. Specimens: * No specimens in log *            Complications:   None; patient tolerated the procedure well. EBL:Minimal    Discharge disposition: To PACU.      Louise Hutchinson MD  August 6, 2019  5:30 PM

## 2019-08-07 VITALS
WEIGHT: 126.32 LBS | HEIGHT: 62 IN | OXYGEN SATURATION: 98 % | BODY MASS INDEX: 23.25 KG/M2 | HEART RATE: 63 BPM | SYSTOLIC BLOOD PRESSURE: 173 MMHG | DIASTOLIC BLOOD PRESSURE: 88 MMHG | TEMPERATURE: 97.6 F | RESPIRATION RATE: 18 BRPM

## 2019-08-07 LAB
ALBUMIN SERPL-MCNC: 3.2 G/DL (ref 3.4–5)
ALBUMIN/GLOB SERPL: 1 {RATIO} (ref 0.8–1.7)
ALP SERPL-CCNC: 353 U/L (ref 45–117)
ALT SERPL-CCNC: 183 U/L (ref 13–56)
ANION GAP SERPL CALC-SCNC: 7 MMOL/L (ref 3–18)
AST SERPL-CCNC: 51 U/L (ref 10–38)
BASOPHILS # BLD: 0 K/UL (ref 0–0.1)
BASOPHILS NFR BLD: 0 % (ref 0–2)
BILIRUB SERPL-MCNC: 0.6 MG/DL (ref 0.2–1)
BUN SERPL-MCNC: 17 MG/DL (ref 7–18)
BUN/CREAT SERPL: 17 (ref 12–20)
CALCIUM SERPL-MCNC: 8.7 MG/DL (ref 8.5–10.1)
CHLORIDE SERPL-SCNC: 108 MMOL/L (ref 100–111)
CO2 SERPL-SCNC: 26 MMOL/L (ref 21–32)
CREAT SERPL-MCNC: 0.98 MG/DL (ref 0.6–1.3)
DIFFERENTIAL METHOD BLD: ABNORMAL
EOSINOPHIL # BLD: 0.1 K/UL (ref 0–0.4)
EOSINOPHIL NFR BLD: 1 % (ref 0–5)
ERYTHROCYTE [DISTWIDTH] IN BLOOD BY AUTOMATED COUNT: 12.7 % (ref 11.6–14.5)
GLOBULIN SER CALC-MCNC: 3.2 G/DL (ref 2–4)
GLUCOSE SERPL-MCNC: 111 MG/DL (ref 74–99)
HCT VFR BLD AUTO: 36.9 % (ref 35–45)
HGB BLD-MCNC: 12.2 G/DL (ref 12–16)
LYMPHOCYTES # BLD: 1.4 K/UL (ref 0.9–3.6)
LYMPHOCYTES NFR BLD: 21 % (ref 21–52)
MCH RBC QN AUTO: 32.8 PG (ref 24–34)
MCHC RBC AUTO-ENTMCNC: 33.1 G/DL (ref 31–37)
MCV RBC AUTO: 99.2 FL (ref 74–97)
MONOCYTES # BLD: 0.5 K/UL (ref 0.05–1.2)
MONOCYTES NFR BLD: 7 % (ref 3–10)
NEUTS SEG # BLD: 4.8 K/UL (ref 1.8–8)
NEUTS SEG NFR BLD: 71 % (ref 40–73)
PLATELET # BLD AUTO: 334 K/UL (ref 135–420)
PMV BLD AUTO: 9.9 FL (ref 9.2–11.8)
POTASSIUM SERPL-SCNC: 3.9 MMOL/L (ref 3.5–5.5)
PROT SERPL-MCNC: 6.4 G/DL (ref 6.4–8.2)
RBC # BLD AUTO: 3.72 M/UL (ref 4.2–5.3)
SODIUM SERPL-SCNC: 141 MMOL/L (ref 136–145)
WBC # BLD AUTO: 6.7 K/UL (ref 4.6–13.2)

## 2019-08-07 PROCEDURE — 85025 COMPLETE CBC W/AUTO DIFF WBC: CPT

## 2019-08-07 PROCEDURE — 36415 COLL VENOUS BLD VENIPUNCTURE: CPT

## 2019-08-07 PROCEDURE — 80053 COMPREHEN METABOLIC PANEL: CPT

## 2019-08-07 PROCEDURE — 74011250637 HC RX REV CODE- 250/637: Performed by: INTERNAL MEDICINE

## 2019-08-07 PROCEDURE — 74011000258 HC RX REV CODE- 258: Performed by: INTERNAL MEDICINE

## 2019-08-07 PROCEDURE — 74011250636 HC RX REV CODE- 250/636: Performed by: INTERNAL MEDICINE

## 2019-08-07 RX ADMIN — PANTOPRAZOLE SODIUM 40 MG: 40 TABLET, DELAYED RELEASE ORAL at 09:21

## 2019-08-07 RX ADMIN — Medication 10 ML: at 06:00

## 2019-08-07 RX ADMIN — HEPARIN SODIUM 5000 UNITS: 5000 INJECTION INTRAVENOUS; SUBCUTANEOUS at 09:21

## 2019-08-07 RX ADMIN — Medication 10 ML: at 03:20

## 2019-08-07 RX ADMIN — HEPARIN SODIUM 5000 UNITS: 5000 INJECTION INTRAVENOUS; SUBCUTANEOUS at 03:20

## 2019-08-07 RX ADMIN — PIPERACILLIN SODIUM,TAZOBACTAM SODIUM 3.38 G: 3; .375 INJECTION, POWDER, FOR SOLUTION INTRAVENOUS at 03:20

## 2019-08-07 NOTE — PROGRESS NOTES
Problem: Discharge Planning  Goal: *Discharge to safe environment  Outcome: Resolved/Met      Home    Noted orders for discharge. No needs identified. Available as needed. Kaylin AhnRN,ext [phone]. Care Management Interventions  PCP Verified by CM: Yes(saw a few weeks ago)  Mode of Transport at Discharge:  Other (see comment)(friend or family)  Transition of Care Consult (CM Consult): Discharge Planning, [address]: Yes  Discharge Durable Medical Equipment: No  Physical Therapy Consult: Yes  Occupational Therapy Consult: Yes  Speech Therapy Consult: Yes  Current Support Network: Lives Alone  Confirm Follow Up Transport: Other (see comment)  Plan discussed with Pt/Family/Caregiver: Yes  Discharge Location  Discharge Placement: Home

## 2019-08-07 NOTE — PROGRESS NOTES
Discharge instructions provided to pt on behalf of primary nurse Dasha English. No new prescriptions ordered. Pt escorted to car to be driven by family. Pt left in stable condition.

## 2019-08-07 NOTE — DISCHARGE SUMMARY
Discharge Summary      [facility] service    Patient ID:  Paulo Jones, 78 y.o., female  : 1940    Admit Date: 2019  Discharge Date:  2019  Length of stay: 5 day(s)    PCP:  Teresia Castleman, MD    Chief Complaint   Patient presents with    Chest Pain    Shortness of Breath       Discharge Diagnosis:     Hospital Problems  Date Reviewed: 2019          Codes Class Noted POA    * (Principal) Common bile duct (CBD) obstruction ICD-10-CM: K83.1  ICD-9-CM: 576.2  2019 Yes        Common bile duct stone ICD-10-CM: K80.50  ICD-9-CM: 574.50  2017 Yes        Elevated LFTs ICD-10-CM: R94.5  ICD-9-CM: 790.6  2017 Yes              Discharge instructions:    #  Discharge Diet:            Diet: Resume previous diet  # Discharge activity and restrictions: Activity as tolerated    # Follow-up appointments: Follow-up Information     Follow up With Specialties Details Why Niraj Hernandez MD Nephrology In 1 week  [place] [address] [address]  [phone]              HPI on Admission (per admitting physician): Paulo Jones is a 78 y.o. female who presented to the ED with epigastric and anterior chest pain that started 1 week ago, evaluated for heart issues at that time with negative cardiac w/up. Since than she continued to have intermittent epigastric pain, associated with nausea and vomiting. Sx got worse for the last 24 hours. ED evaluation revealed CBD obstruction. GI consult called from ED for ERCP. Will admit for further evaluation and treatment. For further details and initial management please refer to H/P. Hospital Course:      Pt report feeling great, eating well no abdominal pains or other symptoms and want to go home now. Dw GI, she can go home. No need Abx. Will dc her home for OP FU. She had 2 ERCP, see details. By Problems :   Common bile duct (CBD) obstruction.  S/P  ERCP with Stent Placement a straight plastic  10 cm 7 French. Two , large (12-13 mm ) CBD stones, unable to remove, plan for Repeat ERCP and remove stones via lithotripsy which done yesterday. Followed LFT , much improved. GI followed her . As needed Pain management. Started empiric Abx Zosyn on admission. No signs or symptoms of infection.         Elevated LFTs  Monitored LFTs. Improved.      Abdominal pains. Control pains.     Hypokalemia. Replete K , trend     Discharge condition:  good, improved and stable    Disposition:  Home    Procedures:   Procedure(s) with comments:  ENDOSCOPIC RETROGRADE CHOLANGIOPANCREATOGRAPHY (ERCP) - ERCP with sphincterotomy  ERCP with balloon dilation   ERCP with sweeping the duct   ERCP with stent removal       Consultants: GI    Physical Exam on Discharge:  Visit Vitals  /88 (BP 1 Location: Right arm, BP Patient Position: At rest)   Pulse 63   Temp 97.6 °F (36.4 °C)   Resp 18   Ht 5' 2\" (1.575 m)   Wt 57.3 kg (126 lb 5.2 oz)   SpO2 98%   Breastfeeding? No   BMI 23.10 kg/m²     Gen:    Well-developed, well-nourished, in no acute distress  HEENT: Head atraumatic, normocephalic , Pink conjunctivae,  hearing intact to voice, moist mucous membranes  Neck:  No apparent JVD, Supple, without masses, thyroid non-tender  Resp:  No accessory muscle use, Bilateral BS present,clear breath sounds without wheezes rales or rhonchi  Card:  normal S1, S2 without thrills, bruits or Gallop. No significant lower leg peripheral edema.   Abd:    Soft, non-tender, not distended, normoactive bowel sounds are present   Musc:  No cyanosis or clubbing  Skin:   No rashes or ulcers, skin turgor is good   Neuro:  Cranial nerves are grossly intact, no clear area of focal motor weakness, follows commands appropriately    alerteakness, follows commands appropriately  Psych:  Good insight, oriented to person, place and time, alert    Hospitalization and discharge instructions d/c in details with : patient ,GI , Nursing/CM     Discharge medications :  Current Discharge Medication List      CONTINUE these medications which have NOT CHANGED    Details   multivitamin with iron tablet Take 1 Tab by mouth daily. esomeprazole (NEXIUM) 40 mg capsule Take 40 mg by mouth two (2) times a day. Most Recent Labs:  Recent Results (from the past 24 hour(s))   CBC WITH AUTOMATED DIFF    Collection Time: 08/07/19  4:30 AM   Result Value Ref Range    WBC 6.7 4.6 - 13.2 K/uL    RBC 3.72 (L) 4.20 - 5.30 M/uL    HGB 12.2 12.0 - 16.0 g/dL    HCT 36.9 35.0 - 45.0 %    MCV 99.2 (H) 74.0 - 97.0 FL    MCH 32.8 24.0 - 34.0 PG    MCHC 33.1 31.0 - 37.0 g/dL    RDW 12.7 11.6 - 14.5 %    PLATELET 190 736 - 030 K/uL    MPV 9.9 9.2 - 11.8 FL    NEUTROPHILS 71 40 - 73 %    LYMPHOCYTES 21 21 - 52 %    MONOCYTES 7 3 - 10 %    EOSINOPHILS 1 0 - 5 %    BASOPHILS 0 0 - 2 %    ABS. NEUTROPHILS 4.8 1.8 - 8.0 K/UL    ABS. LYMPHOCYTES 1.4 0.9 - 3.6 K/UL    ABS. MONOCYTES 0.5 0.05 - 1.2 K/UL    ABS. EOSINOPHILS 0.1 0.0 - 0.4 K/UL    ABS. BASOPHILS 0.0 0.0 - 0.1 K/UL    DF AUTOMATED     METABOLIC PANEL, COMPREHENSIVE    Collection Time: 08/07/19  4:30 AM   Result Value Ref Range    Sodium 141 136 - 145 mmol/L    Potassium 3.9 3.5 - 5.5 mmol/L    Chloride 108 100 - 111 mmol/L    CO2 26 21 - 32 mmol/L    Anion gap 7 3.0 - 18 mmol/L    Glucose 111 (H) 74 - 99 mg/dL    BUN 17 7.0 - 18 MG/DL    Creatinine 0.98 0.6 - 1.3 MG/DL    BUN/Creatinine ratio 17 12 - 20      GFR est AA >60 >60 ml/min/1.73m2    GFR est non-AA 55 (L) >60 ml/min/1.73m2    Calcium 8.7 8.5 - 10.1 MG/DL    Bilirubin, total 0.6 0.2 - 1.0 MG/DL    ALT (SGPT) 183 (H) 13 - 56 U/L    AST (SGOT) 51 (H) 10 - 38 U/L    Alk.  phosphatase 353 (H) 45 - 117 U/L    Protein, total 6.4 6.4 - 8.2 g/dL    Albumin 3.2 (L) 3.4 - 5.0 g/dL    Globulin 3.2 2.0 - 4.0 g/dL    A-G Ratio 1.0 0.8 - 1.7           XR Results:  Results from Hospital Encounter encounter on 08/02/19   XR ERCP FLUOROSCOPY    Narrative EXAM: XR ERCP FLUOROSCOPY    CLINICAL INDICATION/HISTORY: Biliary duct stones, elevated liver function tests    > Additional: None. COMPARISON: CT scan of the chest and abdomen 8/2/2019    TECHNIQUE: 2 spot fluoroscopic images centered over the right upper quadrant  obtained. Fluoroscopic time: 404 seconds. Fluoroscopic dose (reference air kerma): 2.71 mGy    _______________    FINDINGS:    Provided images demonstrate an operative endoscope and wire projecting over the  common bile duct. Potential radiographic correlate for distal CBD stone noted on  preceding cross-sectional imaging.    _______________      Impression IMPRESSION:      1. Operative endoscope and likely radiographic correlate for distal CBD stone  noted. Recommend correlation with endoscopist's procedural note for full  details. CT Results:  Results from Hospital Encounter encounter on 08/02/19   CT ABD PELV W CONT    Narrative EXAM: CT of the abdomen and pelvis with contrast    INDICATION: Chest and abdominal pain. COMPARISON: None. TECHNIQUE: Axial CT imaging of the abdomen and pelvis was performed with  intravenous contrast. Multiplanar reformats were generated. One or more dose  reduction techniques were used on this CT: automated exposure control,  adjustment of the mAs and/or kVp according to patient size, and iterative  reconstruction techniques. The specific techniques used on this CT exam have  been documented in the patient's electronic medical record. Digital Imaging and  Communications in Medicine (DICOM) format image data are available to  nonaffiliated external healthcare facilities or entities on a secure, media  free, reciprocally searchable basis with patient authorization for at least a  12-month period after this study    _______________    FINDINGS:    LOWER CHEST: Refer to CTA thorax report. LIVER, BILIARY: Simple appearing cyst in dome of right lobe of liver measures  2.2 cm. Mild intrahepatic biliary dilatation.  More prominent dilatation of  common duct measuring up to 1.4 cm secondary to distal common duct calculus  grossly measuring about 10 mm. Cholecystectomy. PANCREAS: Normal.    SPLEEN: Normal.    ADRENALS: Normal.    KIDNEYS/URETERS: Hypodense very likely solid mass upper pole left kidney  measures about 1.8 cm. Simple appearing cyst lower pole left kidney measures  about 3.3 cm. LYMPH NODES: No enlarged lymph nodes. GASTROINTESTINAL TRACT: Left colon diverticulosis. No bowel dilatation. PELVIC ORGANS: Hysterectomy. Left ovarian cystic lesion measures 2.2 cm. Prior  measurement about 1.9 cm. VASCULATURE: Unremarkable. BONES: No acute or aggressive osseous abnormalities identified. OTHER: Small fat-containing umbilical hernia.    _______________      Impression IMPRESSION:      1. Biliary dilatation secondary to distal common duct calculus. 2. Simple appearing hepatic cyst. Simple appearing left renal cyst.    3. Hypodense mass upper pole left kidney likely solid and, therefore, should be  considered renal cell carcinoma until proven otherwise. This could be further  evaluated on elective basis with ultrasound. 4. Hysterectomy. Left ovarian cystic lesion is indeterminate and has not changed  significant in size since prior study. This could be further evaluated with  elective ultrasound if desired. 5. Left colon diverticulosis. MRI Results:  Results from Hospital Encounter encounter on 01/05/17   MRI ABD W MRCP W WO CONT    Narrative EXAM: MRI abdomen with and without contrast.    INDICATION: Upper abdominal pain. COMPARISON: 12/7/2016 ultrasound. TECHNIQUE:  Precontrast and dynamic postcontrast MR imaging of the abdomen was  performed. MRCP was included.    _______________    FINDINGS:    LOWER CHEST: Unremarkable. LIVER: Normal liver contour and signal. Simple cyst in segment 7. No suspicious  lesion.      BILIARY: No significant ductal dilation or irregularity, common duct measuring 7  mm which is normal for the patient's age. There are multiple filling defects  within the mid/distal CBD compatible with choledocholithiasis. No stones seen in  the gallbladder. SPLEEN: Normal.    PANCREAS: Normal.    ADRENALS: Normal.    KIDNEYS: Mild left renal atrophy and simple cysts. No suspicious abnormality. LYMPH NODES: No enlarged lymph nodes. GASTROINTESTINAL TRACT: No bowel dilation or wall thickening. VASCULATURE: Unremarkable. BONES: No acute or aggressive osseous abnormalities identified. OTHER: None.    _______________      Impression IMPRESSION:    Choledocholithiasis without significant ductal dilation. No stones identified in  the gallbladder. Nuclear Medicine Results:  No results found for this or any previous visit. US Results:  Results from [place] encounter on 09/28/17   [facility]    Narrative Ultrasound Abdomen Limited      History: Epigastric and right upper quadrant pain    Comparison: CT of the abdomen and pelvis 2/8/2017, prior ultrasound 2/8/2017,  prior MRCP 1/5/2017    Technique: Multiple gray scale sonographic images of the abdomen were obtained  with attention to the right upper quadrant. Additional color Doppler and Doppler  wave form images were also acquired. Findings: Examination is limited by bowel gas. The liver is normal in size and echotexture. No focal hepatic lesions are  evident. There is mild intrahepatic biliary ductal dilatation. The portal vein  is patent with hepatopedal blood flow and measures mm in diameter. The gallbladder is distended. No pericholecystic fluid or wall thickening. Layering mixed echogenicity material noted, likely in keeping with biliary  sludge. There is no cholelithiasis evident. The common bile duct measures  approximately 12 mm in diameter. Sonographic Garvey's sign is absent per the  technologist.    The pancreas is not well visualized secondary to overlying bowel gas.     The right kidney measures 10.4 cm in length. There is no intrarenal mass,  hydronephrosis, or nephrolithiasis. Interpolar right renal cyst measures  approximately 8 x 7 x 5 mm. The included portion of the inferior vena cava is patent. Impression IMPRESSION:   1. Mild intrahepatic and extra hepatic biliary ductal dilatation. No evidence of  common duct stone on the provided images. 2. Mildly distended gallbladder, nonspecific, without pericholecystic fluid or  wall thickening. No gallstones. Layering biliary sludge noted. IR Results:  No results found for this or any previous visit. VAS/US Results:  No results found for this or any previous visit. Total discharge time 35 minutes. Lou Connelly MD  Hospitalist  [facility], [facility]. medical group. Hospitalist Division.   August 7, 2019  9:13 AM

## 2019-08-07 NOTE — DISCHARGE INSTRUCTIONS
DISCHARGE SUMMARY from Nurse    PATIENT INSTRUCTIONS:    After general anesthesia or intravenous sedation, for 24 hours or while taking prescription Narcotics:  · Limit your activities  · Do not drive and operate hazardous machinery  · Do not make important personal or business decisions  · Do  not drink alcoholic beverages  · If you have not urinated within 8 hours after discharge, please contact your surgeon on call. Report the following to your surgeon:  · Excessive pain, swelling, redness or odor of or around the surgical area  · Temperature over 100.5  · Nausea and vomiting lasting longer than 4 hours or if unable to take medications  · Any signs of decreased circulation or nerve impairment to extremity: change in color, persistent  numbness, tingling, coldness or increase pain  · Any questions    What to do at Home:  Recommended activity: Activity as tolerated    If you experience any of the following symptoms abdominal pain, nausea, or vomiting, please follow up with primary care physician/GI doctor/emergency room. *  Please give a list of your current medications to your Primary Care Provider. *  Please update this list whenever your medications are discontinued, doses are      changed, or new medications (including over-the-counter products) are added. *  Please carry medication information at all times in case of emergency situations. These are general instructions for a healthy lifestyle:    No smoking/ No tobacco products/ Avoid exposure to second hand smoke  Surgeon General's Warning:  Quitting smoking now greatly reduces serious risk to your health.     Obesity, smoking, and sedentary lifestyle greatly increases your risk for illness    A healthy diet, regular physical exercise & weight monitoring are important for maintaining a healthy lifestyle    You may be retaining fluid if you have a history of heart failure or if you experience any of the following symptoms:  Weight gain of 3 pounds or more overnight or 5 pounds in a week, increased swelling in our hands or feet or shortness of breath while lying flat in bed. Please call your doctor as soon as you notice any of these symptoms; do not wait until your next office visit. The discharge information has been reviewed with the patient. The patient verbalized understanding. Discharge medications reviewed with the patient and appropriate educational materials and side effects teaching were provided. Patient armband removed and shredded.

## 2019-08-07 NOTE — ROUTINE PROCESS
Bedside and Verbal shift change report given to [facility] RN (oncoming nurse) by Taylor Goldstein RN (offgoing nurse). Report included the following information SBAR, Procedure Summary, Intake/Output and MAR. Pt rested quietly overnight, no complaints. Bedside and Verbal shift change report given to Xavier RN (oncoming nurse) by [facility] RN (offgoing nurse). Report included the following information SBAR, Intake/Output and MAR.

## 2019-08-07 NOTE — PROGRESS NOTES
0800 Received report from off going RN at the bedside inclusive of SBAR, lines drains drips and plan. 0900 Patient denies pain, eating well, no complaints nausea. Teaching to patient discharge orders entered. Relative at the bedside, granddaughter called by patient and will come to  her grandmother for the discharge. Discharge RN preparing paperwork, and will DC IV and discharge to car.

## 2020-01-17 ENCOUNTER — HOSPITAL ENCOUNTER (OUTPATIENT)
Dept: MAMMOGRAPHY | Age: 80
Discharge: HOME | End: 2020-01-17
Attending: NURSE PRACTITIONER
Payer: MEDICARE

## 2020-01-17 ENCOUNTER — HOSPITAL ENCOUNTER (OUTPATIENT)
Dept: GENERAL RADIOLOGY | Age: 80
Discharge: HOME | End: 2020-01-17
Attending: NURSE PRACTITIONER
Payer: MEDICARE

## 2020-01-17 DIAGNOSIS — Z12.31 VISIT FOR SCREENING MAMMOGRAM: ICD-10-CM

## 2020-01-17 DIAGNOSIS — M25.562 LEFT KNEE PAIN: ICD-10-CM

## 2020-01-17 PROCEDURE — 73560 X-RAY EXAM OF KNEE 1 OR 2: CPT

## 2020-01-17 PROCEDURE — 77063 BREAST TOMOSYNTHESIS BI: CPT

## 2020-01-22 ENCOUNTER — APPOINTMENT (OUTPATIENT)
Dept: GENERAL RADIOLOGY | Age: 80
End: 2020-01-22
Attending: EMERGENCY MEDICINE
Payer: MEDICARE

## 2020-01-22 ENCOUNTER — HOSPITAL ENCOUNTER (EMERGENCY)
Age: 80
Discharge: HOME | End: 2020-01-22
Attending: EMERGENCY MEDICINE | Admitting: EMERGENCY MEDICINE
Payer: MEDICARE

## 2020-01-22 VITALS
OXYGEN SATURATION: 96 % | SYSTOLIC BLOOD PRESSURE: 169 MMHG | DIASTOLIC BLOOD PRESSURE: 104 MMHG | HEART RATE: 97 BPM | RESPIRATION RATE: 18 BRPM | WEIGHT: 139 LBS | BODY MASS INDEX: 24.63 KG/M2 | HEIGHT: 63 IN | TEMPERATURE: 100 F

## 2020-01-22 DIAGNOSIS — J20.9 ACUTE BRONCHITIS WITH BRONCHOSPASM: Primary | ICD-10-CM

## 2020-01-22 LAB
ALBUMIN SERPL-MCNC: 4 G/DL (ref 3.4–5)
ALBUMIN/GLOB SERPL: 1.1 {RATIO} (ref 0.8–1.7)
ALP SERPL-CCNC: 84 U/L (ref 45–117)
ALT SERPL-CCNC: 23 U/L (ref 13–56)
ANION GAP SERPL CALC-SCNC: 6 MMOL/L (ref 3–18)
AST SERPL-CCNC: 14 U/L (ref 10–38)
BASOPHILS # BLD: 0 K/UL (ref 0–0.1)
BASOPHILS NFR BLD: 0 % (ref 0–2)
BILIRUB SERPL-MCNC: 0.3 MG/DL (ref 0.2–1)
BUN SERPL-MCNC: 23 MG/DL (ref 7–18)
BUN/CREAT SERPL: 23 (ref 12–20)
CALCIUM SERPL-MCNC: 9.5 MG/DL (ref 8.5–10.1)
CHLORIDE SERPL-SCNC: 104 MMOL/L (ref 100–111)
CO2 SERPL-SCNC: 27 MMOL/L (ref 21–32)
CREAT SERPL-MCNC: 1.02 MG/DL (ref 0.6–1.3)
DIFFERENTIAL METHOD BLD: ABNORMAL
EOSINOPHIL # BLD: 0.2 K/UL (ref 0–0.4)
EOSINOPHIL NFR BLD: 2 % (ref 0–5)
ERYTHROCYTE [DISTWIDTH] IN BLOOD BY AUTOMATED COUNT: 13 % (ref 11.6–14.5)
FLUAV AG NPH QL IA: NEGATIVE
FLUBV AG NOSE QL IA: NEGATIVE
GLOBULIN SER CALC-MCNC: 3.6 G/DL (ref 2–4)
GLUCOSE SERPL-MCNC: 96 MG/DL (ref 74–99)
HCT VFR BLD AUTO: 42 % (ref 35–45)
HGB BLD-MCNC: 14.2 G/DL (ref 12–16)
LACTATE BLD-SCNC: 0.77 MMOL/L (ref 0.4–2)
LYMPHOCYTES # BLD: 1.6 K/UL (ref 0.9–3.6)
LYMPHOCYTES NFR BLD: 17 % (ref 21–52)
MCH RBC QN AUTO: 33.4 PG (ref 24–34)
MCHC RBC AUTO-ENTMCNC: 33.8 G/DL (ref 31–37)
MCV RBC AUTO: 98.8 FL (ref 74–97)
MONOCYTES # BLD: 0.8 K/UL (ref 0.05–1.2)
MONOCYTES NFR BLD: 8 % (ref 3–10)
NEUTS SEG # BLD: 6.7 K/UL (ref 1.8–8)
NEUTS SEG NFR BLD: 73 % (ref 40–73)
PLATELET # BLD AUTO: 253 K/UL (ref 135–420)
PMV BLD AUTO: 9.4 FL (ref 9.2–11.8)
POTASSIUM SERPL-SCNC: 3.9 MMOL/L (ref 3.5–5.5)
PROT SERPL-MCNC: 7.6 G/DL (ref 6.4–8.2)
RBC # BLD AUTO: 4.25 M/UL (ref 4.2–5.3)
SODIUM SERPL-SCNC: 137 MMOL/L (ref 136–145)
WBC # BLD AUTO: 9.3 K/UL (ref 4.6–13.2)

## 2020-01-22 PROCEDURE — 87040 BLOOD CULTURE FOR BACTERIA: CPT

## 2020-01-22 PROCEDURE — 93005 ELECTROCARDIOGRAM TRACING: CPT

## 2020-01-22 PROCEDURE — 74011000258 HC RX REV CODE- 258: Performed by: EMERGENCY MEDICINE

## 2020-01-22 PROCEDURE — 74011250636 HC RX REV CODE- 250/636: Performed by: EMERGENCY MEDICINE

## 2020-01-22 PROCEDURE — 96365 THER/PROPH/DIAG IV INF INIT: CPT

## 2020-01-22 PROCEDURE — 96375 TX/PRO/DX INJ NEW DRUG ADDON: CPT

## 2020-01-22 PROCEDURE — 99283 EMERGENCY DEPT VISIT LOW MDM: CPT

## 2020-01-22 PROCEDURE — 80053 COMPREHEN METABOLIC PANEL: CPT

## 2020-01-22 PROCEDURE — 74011250637 HC RX REV CODE- 250/637: Performed by: EMERGENCY MEDICINE

## 2020-01-22 PROCEDURE — 85025 COMPLETE CBC W/AUTO DIFF WBC: CPT

## 2020-01-22 PROCEDURE — 74011000250 HC RX REV CODE- 250: Performed by: EMERGENCY MEDICINE

## 2020-01-22 PROCEDURE — 71045 X-RAY EXAM CHEST 1 VIEW: CPT

## 2020-01-22 PROCEDURE — 87804 INFLUENZA ASSAY W/OPTIC: CPT

## 2020-01-22 PROCEDURE — 83605 ASSAY OF LACTIC ACID: CPT

## 2020-01-22 RX ORDER — SODIUM CHLORIDE 0.9 % (FLUSH) 0.9 %
5-40 SYRINGE (ML) INJECTION AS NEEDED
Status: DISCONTINUED | OUTPATIENT
Start: 2020-01-22 | End: 2020-01-22 | Stop reason: HOSPADM

## 2020-01-22 RX ORDER — AZITHROMYCIN 250 MG/1
TABLET, FILM COATED ORAL
Qty: 4 TAB | Refills: 0 | Status: SHIPPED | OUTPATIENT
Start: 2020-01-22

## 2020-01-22 RX ORDER — IPRATROPIUM BROMIDE AND ALBUTEROL SULFATE 2.5; .5 MG/3ML; MG/3ML
3 SOLUTION RESPIRATORY (INHALATION) ONCE
Status: COMPLETED | OUTPATIENT
Start: 2020-01-22 | End: 2020-01-22

## 2020-01-22 RX ORDER — BENZONATATE 100 MG/1
100 CAPSULE ORAL
Qty: 21 CAP | Refills: 0 | Status: SHIPPED | OUTPATIENT
Start: 2020-01-22 | End: 2020-01-29

## 2020-01-22 RX ORDER — SODIUM CHLORIDE 9 MG/ML
100 INJECTION, SOLUTION INTRAVENOUS CONTINUOUS
Status: DISCONTINUED | OUTPATIENT
Start: 2020-01-22 | End: 2020-01-22 | Stop reason: HOSPADM

## 2020-01-22 RX ORDER — KETOROLAC TROMETHAMINE 30 MG/ML
15 INJECTION, SOLUTION INTRAMUSCULAR; INTRAVENOUS
Status: COMPLETED | OUTPATIENT
Start: 2020-01-22 | End: 2020-01-22

## 2020-01-22 RX ORDER — SODIUM CHLORIDE 0.9 % (FLUSH) 0.9 %
5-40 SYRINGE (ML) INJECTION EVERY 8 HOURS
Status: DISCONTINUED | OUTPATIENT
Start: 2020-01-22 | End: 2020-01-22 | Stop reason: HOSPADM

## 2020-01-22 RX ORDER — CEFTRIAXONE 1 G/1
1 INJECTION, POWDER, FOR SOLUTION INTRAMUSCULAR; INTRAVENOUS
Status: DISCONTINUED | OUTPATIENT
Start: 2020-01-22 | End: 2020-01-22

## 2020-01-22 RX ORDER — ACETAMINOPHEN 500 MG
1000 TABLET ORAL
Status: COMPLETED | OUTPATIENT
Start: 2020-01-22 | End: 2020-01-22

## 2020-01-22 RX ORDER — AZITHROMYCIN 250 MG/1
500 TABLET, FILM COATED ORAL
Status: COMPLETED | OUTPATIENT
Start: 2020-01-22 | End: 2020-01-22

## 2020-01-22 RX ORDER — ALBUTEROL SULFATE 90 UG/1
2 AEROSOL, METERED RESPIRATORY (INHALATION)
Qty: 1 INHALER | Refills: 0 | Status: SHIPPED | OUTPATIENT
Start: 2020-01-22

## 2020-01-22 RX ADMIN — AZITHROMYCIN MONOHYDRATE 500 MG: 250 TABLET ORAL at 13:15

## 2020-01-22 RX ADMIN — IPRATROPIUM BROMIDE AND ALBUTEROL SULFATE 3 ML: .5; 3 SOLUTION RESPIRATORY (INHALATION) at 12:08

## 2020-01-22 RX ADMIN — SODIUM CHLORIDE 500 ML: 900 INJECTION, SOLUTION INTRAVENOUS at 11:50

## 2020-01-22 RX ADMIN — CEFTRIAXONE 1 G: 1 INJECTION, POWDER, FOR SOLUTION INTRAMUSCULAR; INTRAVENOUS at 13:16

## 2020-01-22 RX ADMIN — KETOROLAC TROMETHAMINE 15 MG: 30 INJECTION, SOLUTION INTRAMUSCULAR at 13:15

## 2020-01-22 RX ADMIN — ACETAMINOPHEN 1000 MG: 500 TABLET, FILM COATED ORAL at 12:58

## 2020-01-22 NOTE — ED TRIAGE NOTES
Patient was sent from urgent care, states she started coughing on Sunday, states the OTC is not helping, now has chest and upper back pain

## 2020-01-22 NOTE — DISCHARGE INSTRUCTIONS
Patient Education        Bronchitis: Care Instructions  Your Care Instructions    Bronchitis is inflammation of the bronchial tubes, which carry air to the lungs. The tubes swell and produce mucus, or phlegm. The mucus and inflamed bronchial tubes make you cough. You may have trouble breathing. Most cases of bronchitis are caused by viruses like those that cause colds. Antibiotics usually do not help and they may be harmful. Bronchitis usually develops rapidly and lasts about 2 to 3 weeks in otherwise healthy people. Follow-up care is a key part of your treatment and safety. Be sure to make and go to all appointments, and call your doctor if you are having problems. It's also a good idea to know your test results and keep a list of the medicines you take. How can you care for yourself at home? · Take all medicines exactly as prescribed. Call your doctor if you think you are having a problem with your medicine. · Get some extra rest.  · Take an over-the-counter pain medicine, such as acetaminophen (Tylenol), ibuprofen (Advil, Motrin), or naproxen (Aleve) to reduce fever and relieve body aches. Read and follow all instructions on the label. · Do not take two or more pain medicines at the same time unless the doctor told you to. Many pain medicines have acetaminophen, which is Tylenol. Too much acetaminophen (Tylenol) can be harmful. · Take an over-the-counter cough medicine that contains dextromethorphan to help quiet a dry, hacking cough so that you can sleep. Avoid cough medicines that have more than one active ingredient. Read and follow all instructions on the label. · Breathe moist air from a humidifier, hot shower, or sink filled with hot water. The heat and moisture will thin mucus so you can cough it out. · Do not smoke. Smoking can make bronchitis worse. If you need help quitting, talk to your doctor about stop-smoking programs and medicines.  These can increase your chances of quitting for good.  When should you call for help? Call 911 anytime you think you may need emergency care. For example, call if:    · You have severe trouble breathing.    Call your doctor now or seek immediate medical care if:    · You have new or worse trouble breathing.     · You cough up dark brown or bloody mucus (sputum).     · You have a new or higher fever.     · You have a new rash.    Watch closely for changes in your health, and be sure to contact your doctor if:    · You cough more deeply or more often, especially if you notice more mucus or a change in the color of your mucus.     · You are not getting better as expected. Where can you learn more? Go to http://adele-gardenia.info/. Enter H333 in the search box to learn more about \"Bronchitis: Care Instructions. \"  Current as of: June 9, 2019  Content Version: 12.2  © 6005-7732 [employer]. Care instructions adapted under license by [employer] (which disclaims liability or warranty for this information). If you have questions about a medical condition or this instruction, always ask your healthcare professional. [address] any warranty or liability for your use of this information.

## 2020-01-22 NOTE — ED PROVIDER NOTES
EMERGENCY DEPARTMENT HISTORY AND PHYSICAL EXAM    11:49 AM      Date: 1/22/2020  Patient Name: Jasbir Tellez    History of Presenting Illness     Chief Complaint   Patient presents with    Chest Pain    Shortness of Breath         History Provided By: Patient      Additional History (Context): Jasbir Tellez is a 78 y.o. female who presents with cough for the past few days with subjective fevers last night as well as back pain pleuritic in nature. She denies any nausea vomiting she states she is having some bronchospasm with some wheeze, mild headache general malaise. Was seen at urgent care who referred her to the emergency department for more definitive treatment. Social history is negative tobacco alcohol her PCP is Dr. Cristina Rivers    PCP: Darin Miguel MD      Current Facility-Administered Medications   Medication Dose Route Frequency Provider Last Rate Last Dose    sodium chloride (NS) flush 5-40 mL  5-40 mL IntraVENous Q8H Ash Hercules MD        sodium chloride (NS) flush 5-40 mL  5-40 mL IntraVENous PRN Ash Hercules MD        0.9% sodium chloride infusion  100 mL/hr IntraVENous CONTINUOUS Ash Hercules MD        cefTRIAXone (ROCEPHIN) injection 1 g  1 g IntraVENous NOW Ash Hercules MD        azithromycin (ZITHROMAX) tablet 500 mg  500 mg Oral NOW Ash Hercules MD        ketorolac (TORADOL) injection 15 mg  15 mg IntraVENous NOW Ash Hercules MD         Current Outpatient Medications   Medication Sig Dispense Refill    azithromycin (ZITHROMAX Z-FELIPE) 250 mg tablet Take 1st dose 24 hours after initial dose that was given in the ER. Then take 1 tablet daily until finished. 4 Tab 0    benzonatate (TESSALON PERLES) 100 mg capsule Take 1 Cap by mouth three (3) times daily as needed for Cough for up to 7 days. 21 Cap 0    albuterol (PROAIR HFA) 90 mcg/actuation inhaler Take 2 Puffs by inhalation every four (4) hours as needed for Wheezing.  1 Inhaler 0    multivitamin with iron tablet Take 1 Tab by mouth daily.  esomeprazole (NEXIUM) 40 mg capsule Take 40 mg by mouth two (2) times a day. Past History     Past Medical History:  Past Medical History:   Diagnosis Date    Abnormal LFTs     Acute neck sprain     Bursitis     right shoulder    Cervicalgia     Chest pain     CKD (chronic kidney disease), stage III (HCC)     Dyslipidemia     Dyspnea     PATRICIA (generalized anxiety disorder)     Gallstones     Gastritis     Gastritis with bleeding     GERD (gastroesophageal reflux disease)     Hordeolum     Hyperlipidemia     Hypertension     Incontinence     Insomnia     Inverted nipple always    bilateral    OAB (overactive bladder)     Pruritus        Past Surgical History:  Past Surgical History:   Procedure Laterality Date    APPENDECTOMY      HX APPENDECTOMY      HX BREAST BIOPSY Right 30 + yrs ago    x2 benign    HX CHOLECYSTECTOMY      HX COLONOSCOPY      HX ENDOSCOPY      HX HYSTERECTOMY         Family History:  Family History   Problem Relation Age of Onset    No Known Problems Brother     Breast Cancer Daughter 50       Social History:  Social History     Tobacco Use    Smoking status: Former Smoker     Last attempt to quit: 1980     Years since quittin.0    Smokeless tobacco: Never Used   Substance Use Topics    Alcohol use: Yes     Comment: occassionally    Drug use: No       Allergies: Allergies   Allergen Reactions    Codeine Other (comments)     Chills, Loss of consciousness     Norco [Hydrocodone-Acetaminophen] Nausea Only    Tylox [Oxycodone-Acetaminophen] Nausea Only    Vistaril [Hydroxyzine Pamoate] Vertigo         Review of Systems       Review of Systems   Constitutional: Positive for activity change, appetite change, chills, fatigue and fever. Negative for diaphoresis. HENT: Positive for congestion. Eyes: Negative for visual disturbance. Respiratory: Positive for cough, chest tightness and wheezing. Negative for shortness of breath. Cardiovascular: Positive for chest pain. Gastrointestinal: Negative for abdominal pain, diarrhea, nausea and vomiting. Musculoskeletal: Positive for back pain. Skin: Negative for rash. Neurological: Negative for dizziness, syncope and weakness. All other systems reviewed and are negative. Physical Exam     Visit Vitals  BP (!) 169/104 (BP 1 Location: Right arm, BP Patient Position: At rest;Sitting)   Pulse 97   Temp 100 °F (37.8 °C)   Resp 18   Ht 5' 2.5\" (1.588 m)   Wt 63 kg (139 lb)   SpO2 96%   BMI 25.02 kg/m²       Physical Exam  Vitals signs and nursing note reviewed. Constitutional:       General: She is not in acute distress. Appearance: She is well-developed. HENT:      Head: Normocephalic and atraumatic. Eyes:      General: No scleral icterus. Conjunctiva/sclera: Conjunctivae normal.      Pupils: Pupils are equal, round, and reactive to light. Neck:      Musculoskeletal: Normal range of motion and neck supple. Cardiovascular:      Rate and Rhythm: Normal rate and regular rhythm. Heart sounds: Normal heart sounds. No murmur. Pulmonary:      Effort: Pulmonary effort is normal. No respiratory distress. Breath sounds: Normal breath sounds. Comments: No respiratory distress mild expiratory wheeze mild rhonchi right base  Abdominal:      General: Bowel sounds are normal. There is no distension. Palpations: Abdomen is soft. Tenderness: There is no tenderness. Musculoskeletal:      Right lower leg: No edema. Left lower leg: No edema. Lymphadenopathy:      Cervical: No cervical adenopathy. Skin:     General: Skin is warm and dry. Findings: No rash. Neurological:      Mental Status: She is alert and oriented to person, place, and time.       Coordination: Coordination normal.   Psychiatric:         Behavior: Behavior normal.           Diagnostic Study Results     Labs -  Recent Results (from the past 12 hour(s))   EKG, 12 LEAD, INITIAL    Collection Time: 01/22/20 11:21 AM   Result Value Ref Range    Ventricular Rate 87 BPM    Atrial Rate 87 BPM    P-R Interval 174 ms    QRS Duration 74 ms    Q-T Interval 374 ms    QTC Calculation (Bezet) 450 ms    Calculated P Axis 57 degrees    Calculated R Axis 23 degrees    Calculated T Axis 52 degrees    Diagnosis       Normal sinus rhythm  Normal ECG  When compared with ECG of 02-AUG-2019 13:26,  No significant change was found     METABOLIC PANEL, COMPREHENSIVE    Collection Time: 01/22/20 12:11 PM   Result Value Ref Range    Sodium 137 136 - 145 mmol/L    Potassium 3.9 3.5 - 5.5 mmol/L    Chloride 104 100 - 111 mmol/L    CO2 27 21 - 32 mmol/L    Anion gap 6 3.0 - 18 mmol/L    Glucose 96 74 - 99 mg/dL    BUN 23 (H) 7.0 - 18 MG/DL    Creatinine 1.02 0.6 - 1.3 MG/DL    BUN/Creatinine ratio 23 (H) 12 - 20      GFR est AA >60 >60 ml/min/1.73m2    GFR est non-AA 52 (L) >60 ml/min/1.73m2    Calcium 9.5 8.5 - 10.1 MG/DL    Bilirubin, total 0.3 0.2 - 1.0 MG/DL    ALT (SGPT) 23 13 - 56 U/L    AST (SGOT) 14 10 - 38 U/L    Alk. phosphatase 84 45 - 117 U/L    Protein, total 7.6 6.4 - 8.2 g/dL    Albumin 4.0 3.4 - 5.0 g/dL    Globulin 3.6 2.0 - 4.0 g/dL    A-G Ratio 1.1 0.8 - 1.7     CBC WITH AUTOMATED DIFF    Collection Time: 01/22/20 12:11 PM   Result Value Ref Range    WBC 9.3 4.6 - 13.2 K/uL    RBC 4.25 4.20 - 5.30 M/uL    HGB 14.2 12.0 - 16.0 g/dL    HCT 42.0 35.0 - 45.0 %    MCV 98.8 (H) 74.0 - 97.0 FL    MCH 33.4 24.0 - 34.0 PG    MCHC 33.8 31.0 - 37.0 g/dL    RDW 13.0 11.6 - 14.5 %    PLATELET 855 298 - 477 K/uL    MPV 9.4 9.2 - 11.8 FL    NEUTROPHILS 73 40 - 73 %    LYMPHOCYTES 17 (L) 21 - 52 %    MONOCYTES 8 3 - 10 %    EOSINOPHILS 2 0 - 5 %    BASOPHILS 0 0 - 2 %    ABS. NEUTROPHILS 6.7 1.8 - 8.0 K/UL    ABS. LYMPHOCYTES 1.6 0.9 - 3.6 K/UL    ABS. MONOCYTES 0.8 0.05 - 1.2 K/UL    ABS. EOSINOPHILS 0.2 0.0 - 0.4 K/UL    ABS.  BASOPHILS 0.0 0.0 - 0.1 K/UL    DF AUTOMATED INFLUENZA A & B AG (RAPID TEST)    Collection Time: 01/22/20 12:11 PM   Result Value Ref Range    Influenza A Antigen NEGATIVE  NEG      Influenza B Antigen NEGATIVE  NEG     POC LACTIC ACID    Collection Time: 01/22/20 12:18 PM   Result Value Ref Range    Lactic Acid (POC) 0.77 0.40 - 2.00 mmol/L       Radiologic Studies -   XR CHEST PORT   Final Result   IMPRESSION:      No acute cardiopulmonary abnormality. Medical Decision Making   I am the first provider for this patient. I reviewed the vital signs, available nursing notes, past medical history, past surgical history, family history and social history. Vital Signs-Reviewed the patient's vital signs. EKG: Normal sinus rhythm at a rate of 87 with no acute ST-T wave changes read by me at 11:25 AM    Records Reviewed: Nursing Notes and Old Medical Records (Time of Review: 11:49 AM)    ED Course: Progress Notes, Reevaluation, and Consults:      Provider Notes (Medical Decision Making):   MDM  Number of Diagnoses or Management Options  Acute bronchitis with bronchospasm:   Diagnosis management comments: General malaise subjective fevers cough concerning for possible pneumonia, bronchitis, influenza no respiratory distress nontoxic-appearing in the emergency department    1:02 PM  Patient stable emergency department she is not hypoxic tachycardic dyspneic there is no respiratory distress her chest x-ray is negative her labs are normal will treat symptomatically. Amount and/or Complexity of Data Reviewed  Clinical lab tests: ordered and reviewed  Tests in the radiology section of CPT®: ordered and reviewed    Risk of Complications, Morbidity, and/or Mortality  Presenting problems: high  Diagnostic procedures: moderate  Management options: moderate            Critical Care Time:       Diagnosis     Clinical Impression:   1.  Acute bronchitis with bronchospasm        Disposition: home     Follow-up Information     Follow up With Specialties Details Why Contact [facility] EMERGENCY DEPT Emergency Medicine  As needed, If symptoms worsen [address]  [address] ([facility]) [place]    Ellis Canavan, MD Nephrology Schedule an appointment as soon as possible for a visit for ED follow up appointment  [address]  [phone]             Patient's Medications   Start Taking    ALBUTEROL (PROAIR HFA) 90 MCG/ACTUATION INHALER    Take 2 Puffs by inhalation every four (4) hours as needed for Wheezing. AZITHROMYCIN (ZITHROMAX Z-FELIPE) 250 MG TABLET    Take 1st dose 24 hours after initial dose that was given in the ER. Then take 1 tablet daily until finished. BENZONATATE (TESSALON PERLES) 100 MG CAPSULE    Take 1 Cap by mouth three (3) times daily as needed for Cough for up to 7 days. Continue Taking    ESOMEPRAZOLE (NEXIUM) 40 MG CAPSULE    Take 40 mg by mouth two (2) times a day. MULTIVITAMIN WITH IRON TABLET    Take 1 Tab by mouth daily. These Medications have changed    No medications on file   Stop Taking    No medications on file     _______________________________    Please note that this dictation was completed with OWM, the computer voice recognition software. Quite often unanticipated grammatical, syntax, homophones, and other interpretive errors are inadvertently transcribed by the computer software. Please disregard these errors. Please excuse any errors that have escaped final proofreading.

## 2020-01-23 LAB
ATRIAL RATE: 87 BPM
CALCULATED P AXIS, ECG09: 57 DEGREES
CALCULATED R AXIS, ECG10: 23 DEGREES
CALCULATED T AXIS, ECG11: 52 DEGREES
DIAGNOSIS, 93000: NORMAL
P-R INTERVAL, ECG05: 174 MS
Q-T INTERVAL, ECG07: 374 MS
QRS DURATION, ECG06: 74 MS
QTC CALCULATION (BEZET), ECG08: 450 MS
VENTRICULAR RATE, ECG03: 87 BPM

## 2020-01-28 LAB
BACTERIA SPEC CULT: NORMAL
SERVICE CMNT-IMP: NORMAL

## 2020-02-25 ENCOUNTER — APPOINTMENT (OUTPATIENT)
Dept: PHYSICAL THERAPY | Age: 80
End: 2020-02-25

## 2020-09-18 ENCOUNTER — HOSPITAL ENCOUNTER (OUTPATIENT)
Dept: VASCULAR SURGERY | Age: 80
Discharge: HOME | End: 2020-09-18
Attending: INTERNAL MEDICINE
Payer: MEDICARE

## 2020-09-18 DIAGNOSIS — H53.123 TRANSIENT VISUAL LOSS OF BOTH EYES: ICD-10-CM

## 2020-09-18 LAB
LEFT CCA DIST DIAS: 9.7 CM/S
LEFT CCA DIST SYS: 48.9 CM/S
LEFT CCA MID DIAS: 15.7 CM/S
LEFT CCA MID SYS: 75.4 CM/S
LEFT CCA PROX DIAS: 13.8 CM/S
LEFT CCA PROX SYS: 67.8 CM/S
LEFT ECA DIAS: 8.8 CM/S
LEFT ECA SYS: 78.9 CM/S
LEFT ICA DIST DIAS: 10.1 CM/S
LEFT ICA DIST SYS: 41.2 CM/S
LEFT ICA MID DIAS: 12.7 CM/S
LEFT ICA MID SYS: 50.4 CM/S
LEFT ICA PROX DIAS: 15.3 CM/S
LEFT ICA PROX SYS: 52.4 CM/S
LEFT ICA/CCA SYS: 0.7
LEFT SUBCLAVIAN DIAS: 0 CM/S
LEFT SUBCLAVIAN SYS: 68.8 CM/S
LEFT VERTEBRAL DIAS: 6.1 CM/S
LEFT VERTEBRAL SYS: 32.3 CM/S
RIGHT CCA DIST DIAS: 14.3 CM/S
RIGHT CCA DIST SYS: 60.9 CM/S
RIGHT CCA MID DIAS: 13 CM/S
RIGHT CCA MID SYS: 60.9 CM/S
RIGHT CCA PROX DIAS: 12.1 CM/S
RIGHT CCA PROX SYS: 59.1 CM/S
RIGHT ECA DIAS: 4.9 CM/S
RIGHT ECA SYS: 68.2 CM/S
RIGHT ICA DIST DIAS: 18.9 CM/S
RIGHT ICA DIST SYS: 65.7 CM/S
RIGHT ICA MID DIAS: 23.9 CM/S
RIGHT ICA MID SYS: 88.5 CM/S
RIGHT ICA PROX DIAS: 18.3 CM/S
RIGHT ICA PROX SYS: 76.8 CM/S
RIGHT ICA/CCA SYS: 1.5
RIGHT SUBCLAVIAN DIAS: 0 CM/S
RIGHT SUBCLAVIAN SYS: 134.6 CM/S
RIGHT VERTEBRAL DIAS: 7.5 CM/S
RIGHT VERTEBRAL SYS: 33.2 CM/S

## 2020-09-18 PROCEDURE — 93880 EXTRACRANIAL BILAT STUDY: CPT

## 2021-01-07 ENCOUNTER — APPOINTMENT (OUTPATIENT)
Dept: CT IMAGING | Age: 81
End: 2021-01-07
Attending: EMERGENCY MEDICINE
Payer: MEDICARE

## 2021-01-07 ENCOUNTER — HOSPITAL ENCOUNTER (EMERGENCY)
Age: 81
Discharge: HOME | End: 2021-01-07
Attending: EMERGENCY MEDICINE
Payer: MEDICARE

## 2021-01-07 ENCOUNTER — APPOINTMENT (OUTPATIENT)
Dept: GENERAL RADIOLOGY | Age: 81
End: 2021-01-07
Attending: EMERGENCY MEDICINE
Payer: MEDICARE

## 2021-01-07 VITALS
TEMPERATURE: 98.6 F | HEART RATE: 80 BPM | OXYGEN SATURATION: 99 % | RESPIRATION RATE: 20 BRPM | SYSTOLIC BLOOD PRESSURE: 137 MMHG | DIASTOLIC BLOOD PRESSURE: 77 MMHG

## 2021-01-07 DIAGNOSIS — N30.90 CYSTITIS: ICD-10-CM

## 2021-01-07 DIAGNOSIS — R11.2 NON-INTRACTABLE VOMITING WITH NAUSEA, UNSPECIFIED VOMITING TYPE: ICD-10-CM

## 2021-01-07 DIAGNOSIS — K57.92 ACUTE DIVERTICULITIS: Primary | ICD-10-CM

## 2021-01-07 DIAGNOSIS — N28.89 RENAL MASS: ICD-10-CM

## 2021-01-07 DIAGNOSIS — Z20.822 EXPOSURE TO COVID-19 VIRUS: ICD-10-CM

## 2021-01-07 DIAGNOSIS — E86.0 MILD DEHYDRATION: ICD-10-CM

## 2021-01-07 LAB
ALBUMIN SERPL-MCNC: 3.8 G/DL (ref 3.4–5)
ALBUMIN/GLOB SERPL: 0.9 {RATIO} (ref 0.8–1.7)
ALP SERPL-CCNC: 111 U/L (ref 45–117)
ALT SERPL-CCNC: 16 U/L (ref 13–56)
ANION GAP SERPL CALC-SCNC: 5 MMOL/L (ref 3–18)
APPEARANCE UR: ABNORMAL
AST SERPL-CCNC: 9 U/L (ref 10–38)
BACTERIA URNS QL MICRO: ABNORMAL /HPF
BASOPHILS # BLD: 0 K/UL (ref 0–0.1)
BASOPHILS NFR BLD: 0 % (ref 0–2)
BILIRUB SERPL-MCNC: 0.3 MG/DL (ref 0.2–1)
BILIRUB UR QL: NEGATIVE
BNP SERPL-MCNC: 170 PG/ML (ref 0–1800)
BUN SERPL-MCNC: 17 MG/DL (ref 7–18)
BUN/CREAT SERPL: 17 (ref 12–20)
CALCIUM SERPL-MCNC: 9.3 MG/DL (ref 8.5–10.1)
CHLORIDE SERPL-SCNC: 107 MMOL/L (ref 100–111)
CO2 SERPL-SCNC: 28 MMOL/L (ref 21–32)
COLOR UR: YELLOW
CREAT SERPL-MCNC: 1.01 MG/DL (ref 0.6–1.3)
D DIMER PPP FEU-MCNC: 0.35 UG/ML(FEU)
DIFFERENTIAL METHOD BLD: ABNORMAL
EOSINOPHIL # BLD: 0.1 K/UL (ref 0–0.4)
EOSINOPHIL NFR BLD: 2 % (ref 0–5)
EPITH CASTS URNS QL MICRO: ABNORMAL /LPF (ref 0–5)
ERYTHROCYTE [DISTWIDTH] IN BLOOD BY AUTOMATED COUNT: 12.3 % (ref 11.6–14.5)
GLOBULIN SER CALC-MCNC: 4.1 G/DL (ref 2–4)
GLUCOSE SERPL-MCNC: 115 MG/DL (ref 74–99)
GLUCOSE UR STRIP.AUTO-MCNC: NEGATIVE MG/DL
HCT VFR BLD AUTO: 44.1 % (ref 35–45)
HGB BLD-MCNC: 14.5 G/DL (ref 12–16)
HGB UR QL STRIP: ABNORMAL
KETONES UR QL STRIP.AUTO: NEGATIVE MG/DL
LEUKOCYTE ESTERASE UR QL STRIP.AUTO: ABNORMAL
LIPASE SERPL-CCNC: 196 U/L (ref 73–393)
LYMPHOCYTES # BLD: 1.1 K/UL (ref 0.9–3.6)
LYMPHOCYTES NFR BLD: 13 % (ref 21–52)
MCH RBC QN AUTO: 33.1 PG (ref 24–34)
MCHC RBC AUTO-ENTMCNC: 32.9 G/DL (ref 31–37)
MCV RBC AUTO: 100.7 FL (ref 74–97)
MONOCYTES # BLD: 0.5 K/UL (ref 0.05–1.2)
MONOCYTES NFR BLD: 6 % (ref 3–10)
NEUTS SEG # BLD: 6.4 K/UL (ref 1.8–8)
NEUTS SEG NFR BLD: 79 % (ref 40–73)
NITRITE UR QL STRIP.AUTO: NEGATIVE
PH UR STRIP: 6.5 [PH] (ref 5–8)
PLATELET # BLD AUTO: 306 K/UL (ref 135–420)
PMV BLD AUTO: 9.6 FL (ref 9.2–11.8)
POTASSIUM SERPL-SCNC: 3.7 MMOL/L (ref 3.5–5.5)
PROT SERPL-MCNC: 7.9 G/DL (ref 6.4–8.2)
PROT UR STRIP-MCNC: 30 MG/DL
RBC # BLD AUTO: 4.38 M/UL (ref 4.2–5.3)
RBC #/AREA URNS HPF: ABNORMAL /HPF (ref 0–5)
SODIUM SERPL-SCNC: 140 MMOL/L (ref 136–145)
SP GR UR REFRACTOMETRY: 1.02 (ref 1–1.03)
TROPONIN I SERPL-MCNC: <0.02 NG/ML (ref 0–0.04)
UROBILINOGEN UR QL STRIP.AUTO: 0.2 EU/DL (ref 0.2–1)
WBC # BLD AUTO: 8.1 K/UL (ref 4.6–13.2)
WBC URNS QL MICRO: ABNORMAL /HPF (ref 0–4)

## 2021-01-07 PROCEDURE — 70450 CT HEAD/BRAIN W/O DYE: CPT

## 2021-01-07 PROCEDURE — 74011250636 HC RX REV CODE- 250/636: Performed by: EMERGENCY MEDICINE

## 2021-01-07 PROCEDURE — 83690 ASSAY OF LIPASE: CPT

## 2021-01-07 PROCEDURE — 87186 SC STD MICRODIL/AGAR DIL: CPT

## 2021-01-07 PROCEDURE — 80053 COMPREHEN METABOLIC PANEL: CPT

## 2021-01-07 PROCEDURE — 94640 AIRWAY INHALATION TREATMENT: CPT

## 2021-01-07 PROCEDURE — 96374 THER/PROPH/DIAG INJ IV PUSH: CPT

## 2021-01-07 PROCEDURE — 71045 X-RAY EXAM CHEST 1 VIEW: CPT

## 2021-01-07 PROCEDURE — 87635 SARS-COV-2 COVID-19 AMP PRB: CPT

## 2021-01-07 PROCEDURE — 93005 ELECTROCARDIOGRAM TRACING: CPT

## 2021-01-07 PROCEDURE — 84484 ASSAY OF TROPONIN QUANT: CPT

## 2021-01-07 PROCEDURE — 74011000636 HC RX REV CODE- 636: Performed by: EMERGENCY MEDICINE

## 2021-01-07 PROCEDURE — 71260 CT THORAX DX C+: CPT

## 2021-01-07 PROCEDURE — 87077 CULTURE AEROBIC IDENTIFY: CPT

## 2021-01-07 PROCEDURE — 81001 URINALYSIS AUTO W/SCOPE: CPT

## 2021-01-07 PROCEDURE — 83880 ASSAY OF NATRIURETIC PEPTIDE: CPT

## 2021-01-07 PROCEDURE — 85025 COMPLETE CBC W/AUTO DIFF WBC: CPT

## 2021-01-07 PROCEDURE — 74011000250 HC RX REV CODE- 250: Performed by: EMERGENCY MEDICINE

## 2021-01-07 PROCEDURE — 85379 FIBRIN DEGRADATION QUANT: CPT

## 2021-01-07 PROCEDURE — 99285 EMERGENCY DEPT VISIT HI MDM: CPT

## 2021-01-07 PROCEDURE — 96361 HYDRATE IV INFUSION ADD-ON: CPT

## 2021-01-07 PROCEDURE — 96375 TX/PRO/DX INJ NEW DRUG ADDON: CPT

## 2021-01-07 PROCEDURE — 87086 URINE CULTURE/COLONY COUNT: CPT

## 2021-01-07 PROCEDURE — 74011250637 HC RX REV CODE- 250/637: Performed by: EMERGENCY MEDICINE

## 2021-01-07 RX ORDER — IPRATROPIUM BROMIDE AND ALBUTEROL SULFATE 2.5; .5 MG/3ML; MG/3ML
3 SOLUTION RESPIRATORY (INHALATION)
Status: COMPLETED | OUTPATIENT
Start: 2021-01-07 | End: 2021-01-07

## 2021-01-07 RX ORDER — METOCLOPRAMIDE HYDROCHLORIDE 5 MG/ML
5 INJECTION INTRAMUSCULAR; INTRAVENOUS
Status: COMPLETED | OUTPATIENT
Start: 2021-01-07 | End: 2021-01-07

## 2021-01-07 RX ORDER — METRONIDAZOLE 500 MG/1
500 TABLET ORAL 3 TIMES DAILY
Qty: 30 TAB | Refills: 0 | Status: SHIPPED | OUTPATIENT
Start: 2021-01-07 | End: 2021-01-17

## 2021-01-07 RX ORDER — ACETAMINOPHEN 325 MG/1
650 TABLET ORAL
Status: COMPLETED | OUTPATIENT
Start: 2021-01-07 | End: 2021-01-07

## 2021-01-07 RX ORDER — ONDANSETRON 4 MG/1
4 TABLET, ORALLY DISINTEGRATING ORAL
Status: COMPLETED | OUTPATIENT
Start: 2021-01-07 | End: 2021-01-07

## 2021-01-07 RX ORDER — ONDANSETRON 4 MG/1
4 TABLET, ORALLY DISINTEGRATING ORAL
Qty: 10 TAB | Refills: 0 | Status: SHIPPED | OUTPATIENT
Start: 2021-01-07 | End: 2021-01-10

## 2021-01-07 RX ORDER — DIPHENHYDRAMINE HYDROCHLORIDE 50 MG/ML
12.5 INJECTION, SOLUTION INTRAMUSCULAR; INTRAVENOUS
Status: COMPLETED | OUTPATIENT
Start: 2021-01-07 | End: 2021-01-07

## 2021-01-07 RX ORDER — CEFPODOXIME PROXETIL 100 MG/1
100 TABLET, FILM COATED ORAL 2 TIMES DAILY
Qty: 20 TAB | Refills: 0 | Status: SHIPPED | OUTPATIENT
Start: 2021-01-07 | End: 2021-01-17

## 2021-01-07 RX ADMIN — IOPAMIDOL 80 ML: 612 INJECTION, SOLUTION INTRAVENOUS at 13:00

## 2021-01-07 RX ADMIN — DIPHENHYDRAMINE HYDROCHLORIDE 12.5 MG: 50 INJECTION, SOLUTION INTRAMUSCULAR; INTRAVENOUS at 12:00

## 2021-01-07 RX ADMIN — SODIUM CHLORIDE 500 ML: 9 INJECTION, SOLUTION INTRAVENOUS at 12:00

## 2021-01-07 RX ADMIN — IPRATROPIUM BROMIDE AND ALBUTEROL SULFATE 3 ML: .5; 3 SOLUTION RESPIRATORY (INHALATION) at 12:00

## 2021-01-07 RX ADMIN — METOCLOPRAMIDE 5 MG: 5 INJECTION, SOLUTION INTRAMUSCULAR; INTRAVENOUS at 12:00

## 2021-01-07 RX ADMIN — ACETAMINOPHEN 650 MG: 325 TABLET, FILM COATED ORAL at 11:59

## 2021-01-07 RX ADMIN — ONDANSETRON 4 MG: 4 TABLET, ORALLY DISINTEGRATING ORAL at 11:59

## 2021-01-07 NOTE — ED NOTES
Pt alert and oriented sats 100 on RA resp 20. even and unlabored at this time. Reviewed over ordered plan of care for pt. She verbalizes understanding  Pt placed to full cardiac monitor. #18 placed to 20 [facility] pt medicated per mar order Ns bolus now infusing without complications. Pt completed neb. Dimmed lights and pt now resting in bed. Call bell within reach. Pt denies any other needs. [facility] Pt pending Ct and chest x-ray at this time.

## 2021-01-07 NOTE — ED NOTES
Pt denies any pain at this time. Helped into wheelchair. Wheeled to lobby and taken home by daughter. No acute signs of distress noted.

## 2021-01-07 NOTE — ED NOTES
Pt gave permission to talk with daughter Shannon Hartley. [phone]. She can be called if pt is to be d/c to home.
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
## 2021-01-07 NOTE — ED TRIAGE NOTES
Son in law dx Monday with covid  Patient tested Tuesday morning with rapid test and was negative and to come back in  Days. Daughter called this morning and patient was very short of breath on phone.   Patient arrives very tired, sob and had vomited today

## 2021-01-08 ENCOUNTER — PATIENT OUTREACH (OUTPATIENT)
Dept: CASE MANAGEMENT | Age: 81
End: 2021-01-08

## 2021-01-08 LAB
ATRIAL RATE: 76 BPM
CALCULATED P AXIS, ECG09: 59 DEGREES
CALCULATED R AXIS, ECG10: 19 DEGREES
CALCULATED T AXIS, ECG11: 58 DEGREES
DIAGNOSIS, 93000: NORMAL
P-R INTERVAL, ECG05: 212 MS
Q-T INTERVAL, ECG07: 412 MS
QRS DURATION, ECG06: 74 MS
QTC CALCULATION (BEZET), ECG08: 463 MS
SARS-COV-2, COV2NT: NOT DETECTED
VENTRICULAR RATE, ECG03: 76 BPM

## 2021-01-08 NOTE — PROGRESS NOTES
Patient contacted regarding recent discharge and COVID-19 risk. Discussed COVID-19 related testing which was pending at this time. Test results were pending. Patient informed of results, if available? no , pending    Outreach made within 2 business days of discharge: Yes    Care Transition Nurse/ Ambulatory Care Manager/ LPN Care Coordinator contacted the patient by telephone to perform post discharge assessment. Verified name and  with patient as identifiers. Patient reported that she is feeling much better now. Patient denied CP, SOB, fever, chills, nausea and vomiting. Patient will go to the nearest emergency room for chest pain, shortness of breath, returning of symptoms that brought her to the emergency room and/or worsening of symptoms. Patient will contact PCP office for any questions or concerns related to healthcare. Patient has following risk factors of: chronic kidney disease and age. CTN/ACM/LPN reviewed discharge instructions, medical action plan and red flags related to discharge diagnosis. Reviewed and educated them on any new and changed medications related to discharge diagnosis. Advised obtaining a 90-day supply of all daily and as-needed medications. Advance Care Planning:   Does patient have an Advance Directive: none noted at this time. Education provided regarding infection prevention, and signs and symptoms of COVID-19 and when to seek medical attention with patient who verbalized understanding. Discussed exposure protocols and quarantine from [address] you at higher risk for severe illness  and given an opportunity for questions and concerns. The patient agrees to contact the COVID-19 hotline [phone] or PCP office for questions related to their healthcare. CTN/ACM/LPN provided contact information for future reference. From CDC: Are you at higher risk for severe illness?  Wash your hands often.    Avoid close contact (6 feet, which is about two arm lengths) with people who are sick.  Put distance between yourself and other people if COVID-19 is spreading in your community.  Clean and disinfect frequently touched surfaces.  Avoid all cruise travel and non-essential air travel.  Call your healthcare professional if you have concerns about COVID-19 and your underlying condition or if you are sick. For more information on steps you can take to protect yourself, see CDC's How to Protect Yourself      Patient/family/caregiver given information for [employer] and agrees to enroll no,  declined      Plan for follow-up call in 7-14 days based on severity of symptoms and risk factors.

## 2021-01-08 NOTE — PROGRESS NOTES
Patient contacted regarding [id number] Rule out. Discussed COVID-19 related testing which was pending at this time. Test results were pending. Patient informed of results, if available? no     CTN Attempt to contact patient via telephone on 1/8/21 for post ED follow up. Unable to reach. Left message on voicemail with office contact information. No Patient medical information left on message.

## 2021-01-10 LAB
BACTERIA SPEC CULT: ABNORMAL
CC UR VC: ABNORMAL
SERVICE CMNT-IMP: ABNORMAL

## 2021-01-22 ENCOUNTER — PATIENT OUTREACH (OUTPATIENT)
Dept: CASE MANAGEMENT | Age: 81
End: 2021-01-22

## 2021-01-22 NOTE — PROGRESS NOTES
Patient resolved from [address] Transitions episode on 1/22/21 1/8/2021 10:36 PM - Joey, Lab In [facility]    Component Value Flag Ref Range Units Status   SARS-CoV-2 Not Detected   Not Detected   Final   Comment:     CTN Attempt to contact patient via telephone on 1/22/21 for  follow up. Unable to reach. Left message on voicemail with office contact information. No Patient medical information left on message. No further outreach scheduled with this CTN. Episode of Care resolved.

## 2021-09-25 NOTE — ED NOTES
Pt helped up to bathroom. positioned for comfort in bed, states she is feeling better and has no complaints at this time. Awaiting CT results. Lab made aware to add on urine culture. normal (ped)...

## (undated) DEVICE — FLEX ADVANTAGE 1500CC: Brand: FLEX ADVANTAGE

## (undated) DEVICE — RETRIEVAL BALLOON CATHETER: Brand: EXTRACTOR™ PRO RX

## (undated) DEVICE — REM POLYHESIVE ADULT PATIENT RETURN ELECTRODE: Brand: VALLEYLAB

## (undated) DEVICE — BAG DRAIN BILE TUBE T 19OZ --

## (undated) DEVICE — SLEEVE TRCR L100MM DIA5MM UNIV STBL FOR BLDELSS DIL TIP

## (undated) DEVICE — SPHINCTEROTOME: Brand: JAGTOME RX 39

## (undated) DEVICE — ESOPHAGEAL/PYLORIC/BILIARY WIREGUIDED BALLOON DILATATION CATHETER: Brand: CRE™ PRO

## (undated) DEVICE — DEVICE INFL 60ML 12ATM CONVENIENT LOK REL HNDL HI PRSS FLX

## (undated) DEVICE — ESOPHAGEAL/PYLORIC/COLONIC/BILIARY WIREGUIDED BALLOON DILATATION CATHETER: Brand: CRE™ PRO

## (undated) DEVICE — 1.5L THIN WALL CAN: Brand: CRD

## (undated) DEVICE — GUIDEWIRE ENDOSCP WRK L450CM DIA0.035IN STD SHFT STR RND

## (undated) DEVICE — CLIP APPLIER: Brand: ENDO CLIP II

## (undated) DEVICE — SOL INJ SOD CL0.9% 10ML PREFIL --

## (undated) DEVICE — BASIN EMESIS 500CC ROSE 250/CS 60/PLT: Brand: MEDEGEN MEDICAL PRODUCTS, LLC

## (undated) DEVICE — SOL INJ L R 1000ML BG --

## (undated) DEVICE — OPEN-END FLEXI-TIP URETERAL CATHETER: Brand: FLEXI-TIP

## (undated) DEVICE — DEVICE LCK BILI RAP EXCHG OLPS --

## (undated) DEVICE — PACK PROCEDURE SURG LAPAROSCOPY 17X7 MM BRTRC PRIMUS

## (undated) DEVICE — SPHINCTEROTOME: Brand: DREAMTOME™ RX 44

## (undated) DEVICE — BITE BLK ENDOSCP AD 54FR GRN POLYETH ENDOSCP W STRP SLD

## (undated) DEVICE — (D)SYR 20ML LR LCK 1ML GRAD -- DISC BY MFR  USE ITEM 304149

## (undated) DEVICE — XL CANNULA: Brand: RAPID EXCHANGE

## (undated) DEVICE — DISPOSABLE SUCTION/IRRIGATOR TUBE SET WITH TIP: Brand: AHTO

## (undated) DEVICE — ESOPHAGEAL BALLOON DILATATION CATHETER: Brand: CRE FIXED WIRE

## (undated) DEVICE — SHEARS: Brand: ENDO SHEARS

## (undated) DEVICE — SUTURE VCRL SZ 2-0 L54IN ABSRB VLT W/O NDL POLYGLACTIN 910 J618H

## (undated) DEVICE — UNIVERSAL FIXATION CANNULA: Brand: VERSAPORT

## (undated) DEVICE — BAG SPEC REM 224ML W4XL6IN DIA10MM 1 HND GYN DISP ENDOPCH

## (undated) DEVICE — DERMABOND SKIN ADH 0.7ML -- DERMABOND ADVANCED 12/BX

## (undated) DEVICE — MEDI-VAC NON-CONDUCTIVE SUCTION TUBING: Brand: CARDINAL HEALTH

## (undated) DEVICE — SOLUTION IV 1000ML 0.9% SOD CHL

## (undated) DEVICE — KENDALL SCD EXPRESS SLEEVES, KNEE LENGTH, MEDIUM: Brand: KENDALL SCD

## (undated) DEVICE — TROCAR LAP L100MM DIA5MM BLDELSS W/ STBL SL ENDOPATH XCEL

## (undated) DEVICE — DRAPE XR C ARM MOB SURG 44X72 --

## (undated) DEVICE — INTENDED FOR TISSUE SEPARATION, AND OTHER PROCEDURES THAT REQUIRE A SHARP SURGICAL BLADE TO PUNCTURE OR CUT.: Brand: BARD-PARKER ® CARBON RIB-BACK BLADES

## (undated) DEVICE — KIT COLON W/ 1.1OZ LUB AND 2 END

## (undated) DEVICE — VISUALIZATION SYSTEM: Brand: CLEARIFY

## (undated) DEVICE — (D)SOL INJ CONRAY 30 50ML -- DISC NO USAGE

## (undated) DEVICE — SUTURE MCRYL SZ 4-0 L27IN ABSRB UD L24MM PS-1 3/8 CIR PRIM Y935H

## (undated) DEVICE — TROCAR ENDOSCP L100MM DIA12MM STBL SL BLDELSS ENDOPATH XCEL